# Patient Record
Sex: FEMALE | Race: WHITE | NOT HISPANIC OR LATINO | Employment: UNEMPLOYED | URBAN - NONMETROPOLITAN AREA
[De-identification: names, ages, dates, MRNs, and addresses within clinical notes are randomized per-mention and may not be internally consistent; named-entity substitution may affect disease eponyms.]

---

## 2020-05-11 ENCOUNTER — HOSPITAL ENCOUNTER (INPATIENT)
Facility: HOSPITAL | Age: 50
LOS: 7 days | Discharge: HOME | End: 2020-05-18
Attending: FAMILY MEDICINE | Admitting: HOSPITALIST

## 2020-05-11 ENCOUNTER — APPOINTMENT (OUTPATIENT)
Dept: GENERAL RADIOLOGY | Facility: HOSPITAL | Age: 50
End: 2020-05-11

## 2020-05-11 ENCOUNTER — APPOINTMENT (OUTPATIENT)
Dept: CT IMAGING | Facility: HOSPITAL | Age: 50
End: 2020-05-11

## 2020-05-11 DIAGNOSIS — Z78.9 IMPAIRED MOBILITY AND ACTIVITIES OF DAILY LIVING: ICD-10-CM

## 2020-05-11 DIAGNOSIS — N17.9 ACUTE RENAL FAILURE, UNSPECIFIED ACUTE RENAL FAILURE TYPE (HCC): ICD-10-CM

## 2020-05-11 DIAGNOSIS — T68.XXXA HYPOTHERMIA, INITIAL ENCOUNTER: ICD-10-CM

## 2020-05-11 DIAGNOSIS — R13.12 OROPHARYNGEAL DYSPHAGIA: ICD-10-CM

## 2020-05-11 DIAGNOSIS — Z74.09 IMPAIRED MOBILITY AND ACTIVITIES OF DAILY LIVING: ICD-10-CM

## 2020-05-11 DIAGNOSIS — Z74.09 IMPAIRED FUNCTIONAL MOBILITY, BALANCE, GAIT, AND ENDURANCE: ICD-10-CM

## 2020-05-11 DIAGNOSIS — E08.10 DIABETIC KETOACIDOSIS WITHOUT COMA ASSOCIATED WITH DIABETES MELLITUS DUE TO UNDERLYING CONDITION (HCC): Primary | ICD-10-CM

## 2020-05-11 PROBLEM — Z91.199 NON COMPLIANCE WITH MEDICAL TREATMENT: Status: ACTIVE | Noted: 2020-02-19

## 2020-05-11 PROBLEM — F41.9 ANXIETY: Status: ACTIVE | Noted: 2020-02-19

## 2020-05-11 PROBLEM — J44.9 CHRONIC OBSTRUCTIVE PULMONARY DISEASE (HCC): Status: ACTIVE | Noted: 2020-02-19

## 2020-05-11 PROBLEM — A41.9 SEPSIS (HCC): Status: ACTIVE | Noted: 2020-05-11

## 2020-05-11 PROBLEM — E87.20 METABOLIC ACIDOSIS: Status: ACTIVE | Noted: 2020-05-11

## 2020-05-11 PROBLEM — E78.5 HLD (HYPERLIPIDEMIA): Status: ACTIVE | Noted: 2020-05-11

## 2020-05-11 PROBLEM — K21.9 GASTROESOPHAGEAL REFLUX DISEASE: Status: ACTIVE | Noted: 2020-02-19

## 2020-05-11 PROBLEM — E86.0 DEHYDRATION: Status: ACTIVE | Noted: 2020-05-11

## 2020-05-11 PROBLEM — E87.1 HYPONATREMIA: Status: ACTIVE | Noted: 2020-03-18

## 2020-05-11 PROBLEM — Z72.0 TOBACCO ABUSE: Status: ACTIVE | Noted: 2020-05-11

## 2020-05-11 PROBLEM — E11.9 DIABETES MELLITUS (HCC): Status: ACTIVE | Noted: 2020-05-11

## 2020-05-11 PROBLEM — E87.1 HYPONATREMIA: Status: ACTIVE | Noted: 2020-05-11

## 2020-05-11 PROBLEM — N39.0 UTI (URINARY TRACT INFECTION): Status: ACTIVE | Noted: 2020-05-11

## 2020-05-11 PROBLEM — D72.829 LEUKOCYTOSIS: Status: ACTIVE | Noted: 2020-05-11

## 2020-05-11 PROBLEM — G93.41 ACUTE METABOLIC ENCEPHALOPATHY: Status: ACTIVE | Noted: 2019-12-22

## 2020-05-11 LAB
ACETONE BLD QL: ABNORMAL
ALBUMIN SERPL-MCNC: 2.5 G/DL (ref 3.5–5.2)
ALBUMIN SERPL-MCNC: 2.7 G/DL (ref 3.5–5.2)
ALBUMIN/GLOB SERPL: 0.7 G/DL
ALBUMIN/GLOB SERPL: 0.7 G/DL
ALP SERPL-CCNC: 268 U/L (ref 39–117)
ALP SERPL-CCNC: 280 U/L (ref 39–117)
ALT SERPL W P-5'-P-CCNC: 12 U/L (ref 1–33)
ALT SERPL W P-5'-P-CCNC: 20 U/L (ref 1–33)
AMORPH URATE CRY URNS QL MICRO: ABNORMAL /HPF
AMPHET+METHAMPHET UR QL: NEGATIVE
AMPHETAMINES UR QL: NEGATIVE
ANION GAP SERPL CALCULATED.3IONS-SCNC: 32 MMOL/L (ref 5–15)
ANION GAP SERPL CALCULATED.3IONS-SCNC: 35 MMOL/L (ref 5–15)
ARTERIAL PATENCY WRIST A: ABNORMAL
ARTERIAL PATENCY WRIST A: ABNORMAL
AST SERPL-CCNC: 20 U/L (ref 1–32)
AST SERPL-CCNC: 43 U/L (ref 1–32)
ATMOSPHERIC PRESS: 753 MMHG
ATMOSPHERIC PRESS: 753 MMHG
BACTERIA UR QL AUTO: ABNORMAL /HPF
BARBITURATES UR QL SCN: NEGATIVE
BASE EXCESS BLDA CALC-SCNC: -24.7 MMOL/L (ref 0–2)
BASE EXCESS BLDA CALC-SCNC: -30.6 MMOL/L (ref 0–2)
BASOPHILS # BLD AUTO: 0.08 10*3/MM3 (ref 0–0.2)
BASOPHILS NFR BLD AUTO: 0.4 % (ref 0–1.5)
BDY SITE: ABNORMAL
BDY SITE: ABNORMAL
BENZODIAZ UR QL SCN: NEGATIVE
BILIRUB SERPL-MCNC: 0.3 MG/DL (ref 0.2–1.2)
BILIRUB SERPL-MCNC: 0.3 MG/DL (ref 0.2–1.2)
BILIRUB UR QL STRIP: NEGATIVE
BUN BLD-MCNC: 49 MG/DL (ref 6–20)
BUN BLD-MCNC: 57 MG/DL (ref 6–20)
BUN/CREAT SERPL: 24.1 (ref 7–25)
BUN/CREAT SERPL: 24.3 (ref 7–25)
BUPRENORPHINE SERPL-MCNC: NEGATIVE NG/ML
CALCIUM SPEC-SCNC: 8.3 MG/DL (ref 8.6–10.5)
CALCIUM SPEC-SCNC: 8.6 MG/DL (ref 8.6–10.5)
CANNABINOIDS SERPL QL: NEGATIVE
CHLORIDE SERPL-SCNC: 76 MMOL/L (ref 98–107)
CHLORIDE SERPL-SCNC: 77 MMOL/L (ref 98–107)
CLARITY UR: ABNORMAL
CO2 SERPL-SCNC: 3 MMOL/L (ref 22–29)
CO2 SERPL-SCNC: 3 MMOL/L (ref 22–29)
COCAINE UR QL: NEGATIVE
COLOR UR: YELLOW
CREAT BLD-MCNC: 2.03 MG/DL (ref 0.57–1)
CREAT BLD-MCNC: 2.35 MG/DL (ref 0.57–1)
D-LACTATE SERPL-SCNC: 2 MMOL/L (ref 0.5–2)
D-LACTATE SERPL-SCNC: 3.8 MMOL/L (ref 0.5–2)
DACRYOCYTES BLD QL SMEAR: ABNORMAL
DEPRECATED RDW RBC AUTO: 59 FL (ref 37–54)
DEPRECATED RDW RBC AUTO: 63.2 FL (ref 37–54)
EOSINOPHIL # BLD AUTO: 0 10*3/MM3 (ref 0–0.4)
EOSINOPHIL NFR BLD AUTO: 0 % (ref 0.3–6.2)
ERYTHROCYTE [DISTWIDTH] IN BLOOD BY AUTOMATED COUNT: 15.3 % (ref 12.3–15.4)
ERYTHROCYTE [DISTWIDTH] IN BLOOD BY AUTOMATED COUNT: 15.4 % (ref 12.3–15.4)
ETHANOL BLD-MCNC: <10 MG/DL (ref 0–10)
ETHANOL UR QL: <0.01 %
GFR SERPL CREATININE-BSD FRML MDRD: 22 ML/MIN/1.73
GFR SERPL CREATININE-BSD FRML MDRD: 26 ML/MIN/1.73
GLOBULIN UR ELPH-MCNC: 3.6 GM/DL
GLOBULIN UR ELPH-MCNC: 3.7 GM/DL
GLUCOSE BLD-MCNC: 1124 MG/DL (ref 65–99)
GLUCOSE BLD-MCNC: 1192 MG/DL (ref 65–99)
GLUCOSE UR STRIP-MCNC: ABNORMAL MG/DL
HBA1C MFR BLD: 11.5 % (ref 4.8–5.6)
HCG SERPL QL: NEGATIVE
HCO3 BLDA-SCNC: 1.9 MMOL/L (ref 20–26)
HCO3 BLDA-SCNC: 3.8 MMOL/L (ref 20–26)
HCT VFR BLD AUTO: 37.4 % (ref 34–46.6)
HCT VFR BLD AUTO: 38.9 % (ref 34–46.6)
HGB BLD-MCNC: 10.8 G/DL (ref 12–15.9)
HGB BLD-MCNC: 10.9 G/DL (ref 12–15.9)
HGB UR QL STRIP.AUTO: ABNORMAL
HOROWITZ INDEX BLD+IHG-RTO: 21 %
HYALINE CASTS UR QL AUTO: ABNORMAL /LPF
IMM GRANULOCYTES # BLD AUTO: 0.15 10*3/MM3 (ref 0–0.05)
IMM GRANULOCYTES NFR BLD AUTO: 0.8 % (ref 0–0.5)
KETONES UR QL STRIP: ABNORMAL
LACTATE HOLD SPECIMEN: NORMAL
LEUKOCYTE ESTERASE UR QL STRIP.AUTO: NEGATIVE
LIPASE SERPL-CCNC: 12 U/L (ref 13–60)
LYMPHOCYTES # BLD AUTO: 1.48 10*3/MM3 (ref 0.7–3.1)
LYMPHOCYTES # BLD MANUAL: 0.26 10*3/MM3 (ref 0.7–3.1)
LYMPHOCYTES NFR BLD AUTO: 7.5 % (ref 19.6–45.3)
LYMPHOCYTES NFR BLD MANUAL: 1 % (ref 19.6–45.3)
LYMPHOCYTES NFR BLD MANUAL: 5 % (ref 5–12)
Lab: ABNORMAL
Lab: ABNORMAL
MAGNESIUM SERPL-MCNC: 2.4 MG/DL (ref 1.6–2.6)
MAGNESIUM SERPL-MCNC: 2.6 MG/DL (ref 1.6–2.6)
MCH RBC QN AUTO: 30 PG (ref 26.6–33)
MCH RBC QN AUTO: 30.5 PG (ref 26.6–33)
MCHC RBC AUTO-ENTMCNC: 28 G/DL (ref 31.5–35.7)
MCHC RBC AUTO-ENTMCNC: 28.9 G/DL (ref 31.5–35.7)
MCV RBC AUTO: 103.9 FL (ref 79–97)
MCV RBC AUTO: 109 FL (ref 79–97)
METAMYELOCYTES NFR BLD MANUAL: 1 % (ref 0–0)
METHADONE UR QL SCN: NEGATIVE
MODALITY: ABNORMAL
MODALITY: ABNORMAL
MONOCYTES # BLD AUTO: 0.23 10*3/MM3 (ref 0.1–0.9)
MONOCYTES # BLD AUTO: 1.28 10*3/MM3 (ref 0.1–0.9)
MONOCYTES NFR BLD AUTO: 1.2 % (ref 5–12)
NEUTROPHILS # BLD AUTO: 17.68 10*3/MM3 (ref 1.7–7)
NEUTROPHILS # BLD AUTO: 23.83 10*3/MM3 (ref 1.7–7)
NEUTROPHILS NFR BLD AUTO: 90.1 % (ref 42.7–76)
NEUTROPHILS NFR BLD MANUAL: 91 % (ref 42.7–76)
NEUTS BAND NFR BLD MANUAL: 2 % (ref 0–5)
NITRITE UR QL STRIP: NEGATIVE
NRBC BLD AUTO-RTO: 0.7 /100 WBC (ref 0–0.2)
OPIATES UR QL: NEGATIVE
OSMOLALITY SERPL: 347 MOSM/KG (ref 280–290)
OXYCODONE UR QL SCN: NEGATIVE
PCO2 BLDA: 11.7 MM HG (ref 35–45)
PCO2 BLDA: 13.6 MM HG (ref 35–45)
PCP UR QL SCN: NEGATIVE
PH BLDA: 6.83 PH UNITS (ref 7.35–7.45)
PH BLDA: 7.05 PH UNITS (ref 7.35–7.45)
PH UR STRIP.AUTO: <=5 [PH] (ref 5–9)
PHOSPHATE SERPL-MCNC: 6.9 MG/DL (ref 2.5–4.5)
PLAT MORPH BLD: NORMAL
PLATELET # BLD AUTO: 288 10*3/MM3 (ref 140–450)
PLATELET # BLD AUTO: 336 10*3/MM3 (ref 140–450)
PMV BLD AUTO: 10.3 FL (ref 6–12)
PMV BLD AUTO: 10.3 FL (ref 6–12)
PO2 BLDA: 145 MM HG (ref 83–108)
PO2 BLDA: 154 MM HG (ref 83–108)
POTASSIUM BLD-SCNC: 5.2 MMOL/L (ref 3.5–5.2)
POTASSIUM BLD-SCNC: 6.4 MMOL/L (ref 3.5–5.2)
PROPOXYPH UR QL: NEGATIVE
PROT SERPL-MCNC: 6.1 G/DL (ref 6–8.5)
PROT SERPL-MCNC: 6.4 G/DL (ref 6–8.5)
PROT UR QL STRIP: ABNORMAL
RBC # BLD AUTO: 3.57 10*6/MM3 (ref 3.77–5.28)
RBC # BLD AUTO: 3.6 10*6/MM3 (ref 3.77–5.28)
RBC # UR: ABNORMAL /HPF
REF LAB TEST METHOD: ABNORMAL
SAO2 % BLDCOA: 98.4 % (ref 94–99)
SAO2 % BLDCOA: 99.4 % (ref 94–99)
SODIUM BLD-SCNC: 112 MMOL/L (ref 136–145)
SODIUM BLD-SCNC: 114 MMOL/L (ref 136–145)
SP GR UR STRIP: 1.02 (ref 1–1.03)
SQUAMOUS #/AREA URNS HPF: ABNORMAL /HPF
TRICYCLICS UR QL SCN: NEGATIVE
TROPONIN T SERPL-MCNC: <0.01 NG/ML (ref 0–0.03)
UROBILINOGEN UR QL STRIP: ABNORMAL
VENTILATOR MODE: ABNORMAL
VENTILATOR MODE: ABNORMAL
WBC MORPH BLD: NORMAL
WBC NRBC COR # BLD: 19.62 10*3/MM3 (ref 3.4–10.8)
WBC NRBC COR # BLD: 25.62 10*3/MM3 (ref 3.4–10.8)
WBC UR QL AUTO: ABNORMAL /HPF
WHOLE BLOOD HOLD SPECIMEN: NORMAL

## 2020-05-11 PROCEDURE — 83735 ASSAY OF MAGNESIUM: CPT | Performed by: HOSPITALIST

## 2020-05-11 PROCEDURE — 25010000003 INSULIN REGULAR HUMAN PER 5 UNITS: Performed by: HOSPITALIST

## 2020-05-11 PROCEDURE — 87088 URINE BACTERIA CULTURE: CPT | Performed by: HOSPITALIST

## 2020-05-11 PROCEDURE — 87040 BLOOD CULTURE FOR BACTERIA: CPT | Performed by: PHYSICIAN ASSISTANT

## 2020-05-11 PROCEDURE — 84703 CHORIONIC GONADOTROPIN ASSAY: CPT | Performed by: PHYSICIAN ASSISTANT

## 2020-05-11 PROCEDURE — 83735 ASSAY OF MAGNESIUM: CPT | Performed by: PHYSICIAN ASSISTANT

## 2020-05-11 PROCEDURE — 74176 CT ABD & PELVIS W/O CONTRAST: CPT

## 2020-05-11 PROCEDURE — 83930 ASSAY OF BLOOD OSMOLALITY: CPT | Performed by: HOSPITALIST

## 2020-05-11 PROCEDURE — 82803 BLOOD GASES ANY COMBINATION: CPT

## 2020-05-11 PROCEDURE — 85025 COMPLETE CBC W/AUTO DIFF WBC: CPT | Performed by: HOSPITALIST

## 2020-05-11 PROCEDURE — 36600 WITHDRAWAL OF ARTERIAL BLOOD: CPT

## 2020-05-11 PROCEDURE — 83690 ASSAY OF LIPASE: CPT | Performed by: PHYSICIAN ASSISTANT

## 2020-05-11 PROCEDURE — 87186 SC STD MICRODIL/AGAR DIL: CPT | Performed by: PHYSICIAN ASSISTANT

## 2020-05-11 PROCEDURE — 25010000003 INSULIN REGULAR HUMAN PER 5 UNITS: Performed by: PHYSICIAN ASSISTANT

## 2020-05-11 PROCEDURE — 99285 EMERGENCY DEPT VISIT HI MDM: CPT

## 2020-05-11 PROCEDURE — 25010000002 PIPERACILLIN-TAZOBACTAM: Performed by: PHYSICIAN ASSISTANT

## 2020-05-11 PROCEDURE — 84100 ASSAY OF PHOSPHORUS: CPT | Performed by: HOSPITALIST

## 2020-05-11 PROCEDURE — 83605 ASSAY OF LACTIC ACID: CPT | Performed by: PHYSICIAN ASSISTANT

## 2020-05-11 PROCEDURE — 87086 URINE CULTURE/COLONY COUNT: CPT | Performed by: HOSPITALIST

## 2020-05-11 PROCEDURE — 81001 URINALYSIS AUTO W/SCOPE: CPT | Performed by: PHYSICIAN ASSISTANT

## 2020-05-11 PROCEDURE — 63710000001 INSULIN REGULAR HUMAN PER 5 UNITS: Performed by: PHYSICIAN ASSISTANT

## 2020-05-11 PROCEDURE — 85007 BL SMEAR W/DIFF WBC COUNT: CPT | Performed by: PHYSICIAN ASSISTANT

## 2020-05-11 PROCEDURE — 80053 COMPREHEN METABOLIC PANEL: CPT | Performed by: HOSPITALIST

## 2020-05-11 PROCEDURE — 80307 DRUG TEST PRSMV CHEM ANLYZR: CPT | Performed by: PHYSICIAN ASSISTANT

## 2020-05-11 PROCEDURE — 87040 BLOOD CULTURE FOR BACTERIA: CPT | Performed by: HOSPITALIST

## 2020-05-11 PROCEDURE — 71045 X-RAY EXAM CHEST 1 VIEW: CPT

## 2020-05-11 PROCEDURE — 82009 KETONE BODYS QUAL: CPT | Performed by: PHYSICIAN ASSISTANT

## 2020-05-11 PROCEDURE — 84484 ASSAY OF TROPONIN QUANT: CPT | Performed by: HOSPITALIST

## 2020-05-11 PROCEDURE — 80053 COMPREHEN METABOLIC PANEL: CPT | Performed by: PHYSICIAN ASSISTANT

## 2020-05-11 PROCEDURE — 83036 HEMOGLOBIN GLYCOSYLATED A1C: CPT | Performed by: HOSPITALIST

## 2020-05-11 PROCEDURE — 87150 DNA/RNA AMPLIFIED PROBE: CPT | Performed by: PHYSICIAN ASSISTANT

## 2020-05-11 PROCEDURE — 85025 COMPLETE CBC W/AUTO DIFF WBC: CPT | Performed by: PHYSICIAN ASSISTANT

## 2020-05-11 PROCEDURE — 87186 SC STD MICRODIL/AGAR DIL: CPT | Performed by: HOSPITALIST

## 2020-05-11 RX ORDER — ACETAMINOPHEN 160 MG/5ML
650 SOLUTION ORAL EVERY 4 HOURS PRN
Status: DISCONTINUED | OUTPATIENT
Start: 2020-05-11 | End: 2020-05-11 | Stop reason: SDUPTHER

## 2020-05-11 RX ORDER — ONDANSETRON 4 MG/1
4 TABLET, FILM COATED ORAL
Status: ON HOLD | COMMUNITY
Start: 2015-06-26 | End: 2020-05-17

## 2020-05-11 RX ORDER — ACETAMINOPHEN 325 MG/1
650 TABLET ORAL EVERY 4 HOURS PRN
Status: DISCONTINUED | OUTPATIENT
Start: 2020-05-11 | End: 2020-05-18 | Stop reason: HOSPADM

## 2020-05-11 RX ORDER — LANCETS 30 GAUGE
EACH MISCELLANEOUS
COMMUNITY
Start: 2019-06-13

## 2020-05-11 RX ORDER — SODIUM CHLORIDE AND POTASSIUM CHLORIDE 150; 900 MG/100ML; MG/100ML
250 INJECTION, SOLUTION INTRAVENOUS CONTINUOUS PRN
Status: DISCONTINUED | OUTPATIENT
Start: 2020-05-11 | End: 2020-05-13

## 2020-05-11 RX ORDER — DEXTROSE AND SODIUM CHLORIDE 5; .9 G/100ML; G/100ML
150 INJECTION, SOLUTION INTRAVENOUS CONTINUOUS PRN
Status: DISCONTINUED | OUTPATIENT
Start: 2020-05-11 | End: 2020-05-13

## 2020-05-11 RX ORDER — DEXTROSE AND SODIUM CHLORIDE 5; .45 G/100ML; G/100ML
150 INJECTION, SOLUTION INTRAVENOUS CONTINUOUS PRN
Status: DISCONTINUED | OUTPATIENT
Start: 2020-05-11 | End: 2020-05-13

## 2020-05-11 RX ORDER — POTASSIUM CHLORIDE, DEXTROSE MONOHYDRATE AND SODIUM CHLORIDE 300; 5; 900 MG/100ML; G/100ML; MG/100ML
150 INJECTION, SOLUTION INTRAVENOUS CONTINUOUS PRN
Status: DISCONTINUED | OUTPATIENT
Start: 2020-05-11 | End: 2020-05-13

## 2020-05-11 RX ORDER — OMEPRAZOLE 20 MG/1
20 CAPSULE, DELAYED RELEASE ORAL DAILY
COMMUNITY

## 2020-05-11 RX ORDER — FLASH GLUCOSE SENSOR
KIT MISCELLANEOUS
COMMUNITY
Start: 2020-03-20 | End: 2020-06-19

## 2020-05-11 RX ORDER — INSULIN GLARGINE 100 [IU]/ML
40 INJECTION, SOLUTION SUBCUTANEOUS DAILY
COMMUNITY
Start: 2020-02-13 | End: 2020-08-12

## 2020-05-11 RX ORDER — IBUPROFEN 800 MG/1
800 TABLET ORAL
COMMUNITY
End: 2020-05-18 | Stop reason: HOSPADM

## 2020-05-11 RX ORDER — PRAMIPEXOLE DIHYDROCHLORIDE 0.5 MG/1
1 TABLET ORAL NIGHTLY
COMMUNITY
Start: 2020-02-13 | End: 2020-08-12

## 2020-05-11 RX ORDER — LEVOCETIRIZINE DIHYDROCHLORIDE 5 MG/1
5 TABLET, FILM COATED ORAL DAILY
COMMUNITY
Start: 2020-02-13 | End: 2020-08-12

## 2020-05-11 RX ORDER — SIMVASTATIN 20 MG
20 TABLET ORAL DAILY
COMMUNITY
Start: 2020-02-13 | End: 2020-08-12

## 2020-05-11 RX ORDER — SODIUM CHLORIDE 0.9 % (FLUSH) 0.9 %
30 SYRINGE (ML) INJECTION ONCE AS NEEDED
Status: DISCONTINUED | OUTPATIENT
Start: 2020-05-11 | End: 2020-05-18 | Stop reason: HOSPADM

## 2020-05-11 RX ORDER — FAMOTIDINE 10 MG/ML
20 INJECTION, SOLUTION INTRAVENOUS DAILY
Status: DISCONTINUED | OUTPATIENT
Start: 2020-05-12 | End: 2020-05-15

## 2020-05-11 RX ORDER — ONDANSETRON 4 MG/1
4 TABLET, FILM COATED ORAL EVERY 6 HOURS PRN
Status: DISCONTINUED | OUTPATIENT
Start: 2020-05-11 | End: 2020-05-18 | Stop reason: HOSPADM

## 2020-05-11 RX ORDER — VENLAFAXINE HYDROCHLORIDE 75 MG/1
75 CAPSULE, EXTENDED RELEASE ORAL DAILY
COMMUNITY
Start: 2020-02-13 | End: 2020-08-12

## 2020-05-11 RX ORDER — SODIUM CHLORIDE 9 MG/ML
10 INJECTION, SOLUTION INTRAVENOUS CONTINUOUS PRN
Status: DISCONTINUED | OUTPATIENT
Start: 2020-05-11 | End: 2020-05-13

## 2020-05-11 RX ORDER — DEXTROSE, SODIUM CHLORIDE, AND POTASSIUM CHLORIDE 5; .9; .15 G/100ML; G/100ML; G/100ML
150 INJECTION INTRAVENOUS CONTINUOUS PRN
Status: DISCONTINUED | OUTPATIENT
Start: 2020-05-11 | End: 2020-05-13

## 2020-05-11 RX ORDER — ACETAMINOPHEN 650 MG/1
650 SUPPOSITORY RECTAL EVERY 4 HOURS PRN
Status: DISCONTINUED | OUTPATIENT
Start: 2020-05-11 | End: 2020-05-18 | Stop reason: HOSPADM

## 2020-05-11 RX ORDER — RANITIDINE 150 MG/1
150 CAPSULE ORAL
Status: ON HOLD | COMMUNITY
End: 2020-05-17 | Stop reason: ALTCHOICE

## 2020-05-11 RX ORDER — BUSPIRONE HYDROCHLORIDE 10 MG/1
10 TABLET ORAL
COMMUNITY
Start: 2020-02-13 | End: 2020-08-12

## 2020-05-11 RX ORDER — DEXTROSE, SODIUM CHLORIDE, AND POTASSIUM CHLORIDE 5; .45; .3 G/100ML; G/100ML; G/100ML
150 INJECTION INTRAVENOUS CONTINUOUS PRN
Status: DISCONTINUED | OUTPATIENT
Start: 2020-05-11 | End: 2020-05-13

## 2020-05-11 RX ORDER — ROPINIROLE 8 MG/1
3 TABLET, FILM COATED, EXTENDED RELEASE ORAL
Status: ON HOLD | COMMUNITY
End: 2020-05-17 | Stop reason: ALTCHOICE

## 2020-05-11 RX ORDER — SODIUM CHLORIDE AND POTASSIUM CHLORIDE 300; 900 MG/100ML; MG/100ML
250 INJECTION, SOLUTION INTRAVENOUS CONTINUOUS PRN
Status: DISCONTINUED | OUTPATIENT
Start: 2020-05-11 | End: 2020-05-13

## 2020-05-11 RX ORDER — DEXTROSE MONOHYDRATE 25 G/50ML
12.5 INJECTION, SOLUTION INTRAVENOUS AS NEEDED
Status: DISCONTINUED | OUTPATIENT
Start: 2020-05-11 | End: 2020-05-13

## 2020-05-11 RX ORDER — IPRATROPIUM BROMIDE AND ALBUTEROL SULFATE 2.5; .5 MG/3ML; MG/3ML
SOLUTION RESPIRATORY (INHALATION)
COMMUNITY
Start: 2020-01-21

## 2020-05-11 RX ORDER — SODIUM CHLORIDE 0.9 % (FLUSH) 0.9 %
10 SYRINGE (ML) INJECTION AS NEEDED
Status: DISCONTINUED | OUTPATIENT
Start: 2020-05-11 | End: 2020-05-18 | Stop reason: HOSPADM

## 2020-05-11 RX ORDER — SODIUM CHLORIDE 9 MG/ML
30 INJECTION, SOLUTION INTRAVENOUS CONTINUOUS PRN
Status: DISCONTINUED | OUTPATIENT
Start: 2020-05-11 | End: 2020-05-13

## 2020-05-11 RX ORDER — DEXTROSE, SODIUM CHLORIDE, AND POTASSIUM CHLORIDE 5; .45; .15 G/100ML; G/100ML; G/100ML
150 INJECTION INTRAVENOUS CONTINUOUS PRN
Status: DISCONTINUED | OUTPATIENT
Start: 2020-05-11 | End: 2020-05-13

## 2020-05-11 RX ORDER — ESTRADIOL 2 MG/1
TABLET ORAL
COMMUNITY
Start: 2020-02-25 | End: 2020-05-18 | Stop reason: HOSPADM

## 2020-05-11 RX ORDER — GABAPENTIN 800 MG/1
800 TABLET ORAL 3 TIMES DAILY
COMMUNITY

## 2020-05-11 RX ORDER — FLUTICASONE PROPIONATE 50 MCG
1 SPRAY, SUSPENSION (ML) NASAL DAILY
COMMUNITY
Start: 2020-02-13 | End: 2020-08-12

## 2020-05-11 RX ORDER — ALBUTEROL SULFATE 90 UG/1
1 AEROSOL, METERED RESPIRATORY (INHALATION) EVERY 6 HOURS PRN
COMMUNITY
Start: 2020-02-13 | End: 2020-08-12

## 2020-05-11 RX ORDER — TRAZODONE HYDROCHLORIDE 100 MG/1
100 TABLET ORAL
COMMUNITY

## 2020-05-11 RX ORDER — ONDANSETRON 2 MG/ML
4 INJECTION INTRAMUSCULAR; INTRAVENOUS EVERY 6 HOURS PRN
Status: DISCONTINUED | OUTPATIENT
Start: 2020-05-11 | End: 2020-05-18 | Stop reason: HOSPADM

## 2020-05-11 RX ORDER — SODIUM CHLORIDE 0.9 % (FLUSH) 0.9 %
10 SYRINGE (ML) INJECTION EVERY 12 HOURS SCHEDULED
Status: DISCONTINUED | OUTPATIENT
Start: 2020-05-11 | End: 2020-05-18 | Stop reason: HOSPADM

## 2020-05-11 RX ORDER — INSULIN LISPRO 100 [IU]/ML
INJECTION, SOLUTION INTRAVENOUS; SUBCUTANEOUS
COMMUNITY
Start: 2020-02-13 | End: 2021-02-14

## 2020-05-11 RX ORDER — SODIUM CHLORIDE 450 MG/100ML
250 INJECTION, SOLUTION INTRAVENOUS CONTINUOUS PRN
Status: DISCONTINUED | OUTPATIENT
Start: 2020-05-11 | End: 2020-05-18 | Stop reason: HOSPADM

## 2020-05-11 RX ORDER — SODIUM CHLORIDE 9 MG/ML
250 INJECTION, SOLUTION INTRAVENOUS CONTINUOUS PRN
Status: DISCONTINUED | OUTPATIENT
Start: 2020-05-11 | End: 2020-05-13

## 2020-05-11 RX ORDER — SODIUM CHLORIDE 0.9 % (FLUSH) 0.9 %
10 SYRINGE (ML) INJECTION ONCE AS NEEDED
Status: DISCONTINUED | OUTPATIENT
Start: 2020-05-11 | End: 2020-05-18 | Stop reason: HOSPADM

## 2020-05-11 RX ORDER — SODIUM CHLORIDE 0.9 % (FLUSH) 0.9 %
3 SYRINGE (ML) INJECTION EVERY 12 HOURS SCHEDULED
Status: DISCONTINUED | OUTPATIENT
Start: 2020-05-11 | End: 2020-05-18 | Stop reason: HOSPADM

## 2020-05-11 RX ORDER — SODIUM CHLORIDE AND POTASSIUM CHLORIDE 150; 450 MG/100ML; MG/100ML
250 INJECTION, SOLUTION INTRAVENOUS CONTINUOUS PRN
Status: DISCONTINUED | OUTPATIENT
Start: 2020-05-11 | End: 2020-05-18 | Stop reason: HOSPADM

## 2020-05-11 RX ORDER — CYCLOBENZAPRINE HCL 10 MG
1 TABLET ORAL
Status: ON HOLD | COMMUNITY
Start: 2019-12-18 | End: 2020-05-17

## 2020-05-11 RX ORDER — SODIUM CHLORIDE 9 MG/ML
250 INJECTION, SOLUTION INTRAVENOUS CONTINUOUS
Status: DISCONTINUED | OUTPATIENT
Start: 2020-05-11 | End: 2020-05-13

## 2020-05-11 RX ADMIN — HUMAN INSULIN 6 UNITS: 100 INJECTION, SOLUTION SUBCUTANEOUS at 19:48

## 2020-05-11 RX ADMIN — SODIUM CHLORIDE 6 UNITS/HR: 9 INJECTION, SOLUTION INTRAVENOUS at 20:00

## 2020-05-11 RX ADMIN — SODIUM CHLORIDE 1899 ML: 900 INJECTION, SOLUTION INTRAVENOUS at 19:48

## 2020-05-11 RX ADMIN — SODIUM CHLORIDE 2000 ML: 9 INJECTION, SOLUTION INTRAVENOUS at 22:00

## 2020-05-11 RX ADMIN — PIPERACILLIN SODIUM,TAZOBACTAM SODIUM 3.38 G: 3; .375 INJECTION, POWDER, FOR SOLUTION INTRAVENOUS at 20:25

## 2020-05-11 RX ADMIN — SODIUM BICARBONATE 50 MEQ: 84 INJECTION INTRAVENOUS at 21:22

## 2020-05-11 RX ADMIN — SODIUM CHLORIDE 250 ML/HR: 900 INJECTION, SOLUTION INTRAVENOUS at 23:00

## 2020-05-11 NOTE — ED PROVIDER NOTES
"Subjective   Patient presents to emergency department for hyperglycemia, abdominal pain.  States symptoms started today but her blood glucose is been very elevated for 2 to 3 days.  Endorses IV insulin use this morning.  States \"my mouth is really dry and I have been drinking a lot of Sprite which is not helping\".  Recent admission for DKA 3/16/2020.        History provided by:  Patient   used: No    Hyperglycemia   Severity:  Severe  Duration:  3 days  Timing:  Constant  Progression:  Worsening  Diabetes status:  Controlled with insulin  Context: not recent illness    Associated symptoms: abdominal pain, fatigue and nausea    Associated symptoms: no chest pain, no confusion, no dysuria, no fever, no shortness of breath, no vomiting and no weakness        Review of Systems   Constitutional: Positive for fatigue. Negative for chills and fever.   HENT: Negative for sore throat and trouble swallowing.    Eyes: Negative for visual disturbance.   Respiratory: Negative for cough, shortness of breath and wheezing.    Cardiovascular: Negative for chest pain and leg swelling.   Gastrointestinal: Positive for abdominal pain and nausea. Negative for vomiting.   Genitourinary: Negative for dysuria and flank pain.   Musculoskeletal: Negative for back pain.   Skin: Negative for color change.   Allergic/Immunologic: Negative for immunocompromised state.   Neurological: Negative for syncope and weakness.   Hematological: Does not bruise/bleed easily.   Psychiatric/Behavioral: Negative for confusion.       History reviewed. No pertinent past medical history.    Allergies   Allergen Reactions   • Codeine Itching and Nausea And Vomiting   • Hydrocodone-Acetaminophen Itching and Nausea And Vomiting   • Morphine Nausea And Vomiting       History reviewed. No pertinent surgical history.    Family History   Problem Relation Age of Onset   • Diabetes Brother        Social History     Socioeconomic History   • Marital " "status:      Spouse name: Not on file   • Number of children: Not on file   • Years of education: Not on file   • Highest education level: Not on file   Tobacco Use   • Smoking status: Current Every Day Smoker   Substance and Sexual Activity   • Alcohol use: Yes   • Drug use: Not Currently   • Sexual activity: Defer           Objective      /59 (BP Location: Right arm, Patient Position: Lying)   Pulse 98   Temp 93.8 °F (34.3 °C) (Rectal)   Resp 16   Ht 167.6 cm (66\")   Wt 63.3 kg (139 lb 9.6 oz)   SpO2 98%   BMI 22.53 kg/m²     Physical Exam   Constitutional: She is oriented to person, place, and time. She appears well-developed and well-nourished. She appears distressed.   HENT:   Head: Normocephalic and atraumatic.   Eyes: Conjunctivae are normal.   Cardiovascular: Normal rate, regular rhythm, normal heart sounds and intact distal pulses.   Pulmonary/Chest: Effort normal and breath sounds normal. No respiratory distress. She has no wheezes.   Kussmaul respirations observed   Abdominal: Soft. Bowel sounds are normal. She exhibits no distension and no mass. There is tenderness ( Generalized, nonfocal). There is no guarding.   Neurological: She is alert and oriented to person, place, and time.   Skin: Capillary refill takes less than 2 seconds. No rash noted.   Psychiatric: She has a normal mood and affect. Her behavior is normal. Thought content normal.   Nursing note and vitals reviewed.      Critical Care  Performed by: Heriberto Springer PA-C  Authorized by: Dhiraj Gudino MD     Critical care provider statement:     Critical care time (minutes):  45    Critical care was necessary to treat or prevent imminent or life-threatening deterioration of the following conditions:  Metabolic crisis, dehydration, sepsis and renal failure    Critical care was time spent personally by me on the following activities:  Ordering and performing treatments and interventions, ordering and review of " radiographic studies, ordering and review of laboratory studies, re-evaluation of patient's condition, examination of patient, evaluation of patient's response to treatment and discussions with consultants    I assumed direction of critical care for this patient from another provider in my specialty: no                 ED Course      Results for orders placed or performed during the hospital encounter of 05/11/20   Comprehensive Metabolic Panel   Result Value Ref Range    Glucose 1,124 (C) 65 - 99 mg/dL    BUN 49 (H) 6 - 20 mg/dL    Creatinine 2.03 (H) 0.57 - 1.00 mg/dL    Sodium 114 (C) 136 - 145 mmol/L    Potassium 6.4 (C) 3.5 - 5.2 mmol/L    Chloride 76 (L) 98 - 107 mmol/L    CO2 3.0 (L) 22.0 - 29.0 mmol/L    Calcium 8.6 8.6 - 10.5 mg/dL    Total Protein 6.4 6.0 - 8.5 g/dL    Albumin 2.70 (L) 3.50 - 5.20 g/dL    ALT (SGPT) 12 1 - 33 U/L    AST (SGOT) 20 1 - 32 U/L    Alkaline Phosphatase 268 (H) 39 - 117 U/L    Total Bilirubin 0.3 0.2 - 1.2 mg/dL    eGFR Non African Amer 26 (L) >60 mL/min/1.73    Globulin 3.7 gm/dL    A/G Ratio 0.7 g/dL    BUN/Creatinine Ratio 24.1 7.0 - 25.0    Anion Gap 35.0 (H) 5.0 - 15.0 mmol/L   Acetone   Result Value Ref Range    Acetone Small (A) Negative   Magnesium   Result Value Ref Range    Magnesium 2.6 1.6 - 2.6 mg/dL   Urinalysis With Microscopic If Indicated (No Culture) - Urine, Clean Catch   Result Value Ref Range    Color, UA Yellow Yellow, Straw, Dark Yellow, [place]    Appearance, UA Cloudy (A) Clear    pH, UA <=5.0 5.0 - 9.0    Specific Gravity, UA 1.020 1.003 - 1.030    Glucose, UA >=1000 mg/dL (3+) (A) Negative    Ketones, UA 80 mg/dL (3+) (A) Negative    Bilirubin, UA Negative Negative    Blood, UA Moderate (2+) (A) Negative    Protein, UA 30 mg/dL (1+) (A) Negative    Leuk Esterase, UA Negative Negative    Nitrite, UA Negative Negative    Urobilinogen, UA 0.2 E.U./dL 0.2 - 1.0 E.U./dL   CBC Auto Differential   Result Value Ref Range    WBC 25.62 (H) 3.40 - 10.80 10*3/mm3     RBC 3.57 (L) 3.77 - 5.28 10*6/mm3    Hemoglobin 10.9 (L) 12.0 - 15.9 g/dL    Hematocrit 38.9 34.0 - 46.6 %    .0 (H) 79.0 - 97.0 fL    MCH 30.5 26.6 - 33.0 pg    MCHC 28.0 (L) 31.5 - 35.7 g/dL    RDW 15.4 12.3 - 15.4 %    RDW-SD 63.2 (H) 37.0 - 54.0 fl    MPV 10.3 6.0 - 12.0 fL    Platelets 336 140 - 450 10*3/mm3   Lipase   Result Value Ref Range    Lipase 12 (L) 13 - 60 U/L   Manual Differential   Result Value Ref Range    Neutrophil % 91.0 (H) 42.7 - 76.0 %    Lymphocyte % 1.0 (L) 19.6 - 45.3 %    Monocyte % 5.0 5.0 - 12.0 %    Bands %  2.0 0.0 - 5.0 %    Metamyelocyte % 1.0 (H) 0.0 - 0.0 %    Neutrophils Absolute 23.83 (H) 1.70 - 7.00 10*3/mm3    Lymphocytes Absolute 0.26 (L) 0.70 - 3.10 10*3/mm3    Monocytes Absolute 1.28 (H) 0.10 - 0.90 10*3/mm3    Dacrocytes Slight/1+ None Seen    WBC Morphology Normal Normal    Platelet Morphology Normal Normal   Urine Drug Screen - Urine, Clean Catch   Result Value Ref Range    THC, Screen, Urine Negative Negative    Phencyclidine (PCP), Urine Negative Negative    Cocaine Screen, Urine Negative Negative    Methamphetamine, Ur Negative Negative    Opiate Screen Negative Negative    Amphetamine Screen, Urine Negative Negative    Benzodiazepine Screen, Urine Negative Negative    Tricyclic Antidepressants Screen Negative Negative    Methadone Screen, Urine Negative Negative    Barbiturates Screen, Urine Negative Negative    Oxycodone Screen, Urine Negative Negative    Propoxyphene Screen Negative Negative    Buprenorphine, Screen, Urine Negative Negative   Lactic Acid, Plasma   Result Value Ref Range    Lactate 3.8 (C) 0.5 - 2.0 mmol/L   hCG, Serum, Qualitative   Result Value Ref Range    HCG Qualitative Negative Negative   Urinalysis, Microscopic Only - Urine, Clean Catch   Result Value Ref Range    RBC, UA 0-2 (A) None Seen /HPF    WBC, UA 13-20 (A) None Seen, 0-2, 3-5 /HPF    Bacteria, UA Trace (A) None Seen /HPF    Squamous Epithelial Cells, UA 3-5 (A) None Seen, 0-2  /HPF    Hyaline Casts, UA 7-12 None Seen /LPF    Amorphous Crystals, UA Small/1+ None Seen /HPF    Methodology Manual Light Microscopy    Blood Gas, Arterial   Result Value Ref Range    Site Left Brachial     Daniel's Test N/A     pH, Arterial 6.828 (L) 7.350 - 7.450 pH units    pCO2, Arterial 11.7 (L) 35.0 - 45.0 mm Hg    pO2, Arterial 145.0 (H) 83.0 - 108.0 mm Hg    HCO3, Arterial 1.9 (L) 20.0 - 26.0 mmol/L    Base Excess, Arterial -30.6 (L) 0.0 - 2.0 mmol/L    O2 Saturation, Arterial 98.4 94.0 - 99.0 %    Barometric Pressure for Blood Gas 753 mmHg    Modality Room Air     FIO2 21 %    Ventilator Mode NA     Collected by     Light Blue Top   Result Value Ref Range    Extra Tube hold for add-on      Ct Abdomen Pelvis Without Contrast    Result Date: 5/11/2020  Narrative: CT Abdomen and Pelvis Without Contrast History: Abdominal pain Axials spiral scans of the abdomen and pelvis were obtained without contrast.  Coronal and sagital reconstructions were preformed.  This exam was performed according to our departmental dose-optimization program, which includes automated exposure control, adjustment of the mA and/or kV according to patient size and/or use of iterative reconstruction technique. DLP: 318.10 Comparison: None Findings: Scans of the lung bases demonstrate old granulomatous disease. No acute osseous abnormality. Degenerative changes and degenerative disc disease in the lumbar spine. The liver is unremarkable. Cholecystectomy. Splenic granulomata. The pancreas is unremarkable. The adrenal glands are unremarkable. No renal or ureteral calculi. No renal mass. No hydronephrosis. Unremarkable bladder. No pelvic mass. Hysterectomy. No abdominal aortic aneurysm. No adenopathy. Moderate amount of fluid distending the stomach. Minimally distended fluid-filled distal esophagus, question gastroesophageal reflux. Moderate amount retained feces in the colon. Diverticulosis of the colon. No bowel obstruction. Normal  appendix. No free air. No free fluid. No hernia.     Impression: Conclusion: Moderate amount of fluid distending the stomach. Minimally distended fluid-filled distal esophagus, question gastroesophageal reflux. Moderate amount retained feces in the colon. Diverticulosis of the colon. Cholecystectomy. Hysterectomy. [id number] Electronically signed by:  Michelet Lawrence MD  5/11/2020 8:34 PM CDT Workstation: [id number]    Xr Chest 1 View    Result Date: 5/11/2020  Narrative: PORTABLE CHEST HISTORY: Shortness of breath Portable AP upright film of the chest was obtained at 7:29 PM. COMPARISON: None FINDINGS: EKG leads. The lungs are clear of an acute process. Old granulomatous disease is present. The heart is not enlarged. The pulmonary vasculature is not increased. No pleural effusion. No pneumothorax. No acute osseous abnormality. Surgical clips right upper quadrant of the abdomen.     Impression: CONCLUSION: No Acute Disease [id number] Electronically signed by:  Michelet Lawrence MD  5/11/2020 7:51 PM CDT Workstation: [email or web address]    Patient admitted to hospitalist.                                     [facility]    Final diagnoses:   Diabetic ketoacidosis without coma associated with diabetes mellitus due to underlying condition (CMS/HCC)   Acute renal failure, unspecified acute renal failure type (CMS/HCC)   Hypothermia, initial encounter                 Heriberto Sprinegr PA-C  05/11/20 1114

## 2020-05-11 NOTE — ED NOTES
This RN and another RN cleaned up pt and pts bed - pt had urinated in the bed. Checked rectal temp.     Lou Partida, PRANEETH  05/11/20 0851

## 2020-05-11 NOTE — ED NOTES
Pt presents to ED via EMS from home with C/O high BS. Pt reports shes been checking her BS at home and it's been high for the past 2 or 3 days. Pt also C/O abdominal pain     Lou Partida RN  05/11/20 [id number]

## 2020-05-11 NOTE — ED NOTES
Pt unhooked herself from the monitor and pulled out IV and was walking around the ED hallways per another RN. RN directed patient to room.     Lou Partida, PRANEETH  05/11/20 1827       Lou Partida, PRANEETH  05/11/20 1830

## 2020-05-11 NOTE — ED NOTES
Attempted for 2nd IV - vein blew, but was able to collect bloodwork     Lou Partida, RN  05/11/20 [id number]

## 2020-05-12 LAB
ALBUMIN SERPL-MCNC: 2.2 G/DL (ref 3.5–5.2)
ALBUMIN/GLOB SERPL: 0.8 G/DL
ALP SERPL-CCNC: 227 U/L (ref 39–117)
ALT SERPL W P-5'-P-CCNC: 41 U/L (ref 1–33)
ANION GAP SERPL CALCULATED.3IONS-SCNC: 13 MMOL/L (ref 5–15)
ANION GAP SERPL CALCULATED.3IONS-SCNC: 14 MMOL/L (ref 5–15)
ANION GAP SERPL CALCULATED.3IONS-SCNC: 15 MMOL/L (ref 5–15)
ANION GAP SERPL CALCULATED.3IONS-SCNC: 15 MMOL/L (ref 5–15)
ANION GAP SERPL CALCULATED.3IONS-SCNC: 17 MMOL/L (ref 5–15)
ANION GAP SERPL CALCULATED.3IONS-SCNC: 21 MMOL/L (ref 5–15)
AST SERPL-CCNC: 97 U/L (ref 1–32)
BACTERIA BLD CULT: ABNORMAL
BASOPHILS # BLD AUTO: 0.02 10*3/MM3 (ref 0–0.2)
BASOPHILS NFR BLD AUTO: 0.4 % (ref 0–1.5)
BILIRUB SERPL-MCNC: 0.2 MG/DL (ref 0.2–1.2)
BUN BLD-MCNC: 38 MG/DL (ref 6–20)
BUN BLD-MCNC: 41 MG/DL (ref 6–20)
BUN BLD-MCNC: 44 MG/DL (ref 6–20)
BUN BLD-MCNC: 45 MG/DL (ref 6–20)
BUN BLD-MCNC: 49 MG/DL (ref 6–20)
BUN BLD-MCNC: 51 MG/DL (ref 6–20)
BUN/CREAT SERPL: 23.3 (ref 7–25)
BUN/CREAT SERPL: 24.3 (ref 7–25)
BUN/CREAT SERPL: 26.2 (ref 7–25)
BUN/CREAT SERPL: 26.3 (ref 7–25)
BUN/CREAT SERPL: 26.3 (ref 7–25)
BUN/CREAT SERPL: 27.1 (ref 7–25)
CALCIUM SPEC-SCNC: 7.2 MG/DL (ref 8.6–10.5)
CALCIUM SPEC-SCNC: 7.3 MG/DL (ref 8.6–10.5)
CALCIUM SPEC-SCNC: 7.4 MG/DL (ref 8.6–10.5)
CALCIUM SPEC-SCNC: 7.4 MG/DL (ref 8.6–10.5)
CALCIUM SPEC-SCNC: 7.5 MG/DL (ref 8.6–10.5)
CALCIUM SPEC-SCNC: 7.5 MG/DL (ref 8.6–10.5)
CHLORIDE SERPL-SCNC: 104 MMOL/L (ref 98–107)
CHLORIDE SERPL-SCNC: 112 MMOL/L (ref 98–107)
CHLORIDE SERPL-SCNC: 114 MMOL/L (ref 98–107)
CHLORIDE SERPL-SCNC: 115 MMOL/L (ref 98–107)
CHLORIDE SERPL-SCNC: 116 MMOL/L (ref 98–107)
CHLORIDE SERPL-SCNC: 99 MMOL/L (ref 98–107)
CO2 SERPL-SCNC: 10 MMOL/L (ref 22–29)
CO2 SERPL-SCNC: 12 MMOL/L (ref 22–29)
CO2 SERPL-SCNC: 13 MMOL/L (ref 22–29)
CO2 SERPL-SCNC: 15 MMOL/L (ref 22–29)
CREAT BLD-MCNC: 1.63 MG/DL (ref 0.57–1)
CREAT BLD-MCNC: 1.68 MG/DL (ref 0.57–1)
CREAT BLD-MCNC: 1.69 MG/DL (ref 0.57–1)
CREAT BLD-MCNC: 1.71 MG/DL (ref 0.57–1)
CREAT BLD-MCNC: 1.86 MG/DL (ref 0.57–1)
CREAT BLD-MCNC: 1.88 MG/DL (ref 0.57–1)
DEPRECATED RDW RBC AUTO: 42.6 FL (ref 37–54)
EOSINOPHIL # BLD AUTO: 0 10*3/MM3 (ref 0–0.4)
EOSINOPHIL NFR BLD AUTO: 0 % (ref 0.3–6.2)
ERYTHROCYTE [DISTWIDTH] IN BLOOD BY AUTOMATED COUNT: 13.4 % (ref 12.3–15.4)
GFR SERPL CREATININE-BSD FRML MDRD: 28 ML/MIN/1.73
GFR SERPL CREATININE-BSD FRML MDRD: 29 ML/MIN/1.73
GFR SERPL CREATININE-BSD FRML MDRD: 32 ML/MIN/1.73
GFR SERPL CREATININE-BSD FRML MDRD: 33 ML/MIN/1.73
GLOBULIN UR ELPH-MCNC: 2.9 GM/DL
GLUCOSE BLD-MCNC: 169 MG/DL (ref 65–99)
GLUCOSE BLD-MCNC: 216 MG/DL (ref 65–99)
GLUCOSE BLD-MCNC: 230 MG/DL (ref 65–99)
GLUCOSE BLD-MCNC: 252 MG/DL (ref 65–99)
GLUCOSE BLD-MCNC: 426 MG/DL (ref 65–99)
GLUCOSE BLD-MCNC: 624 MG/DL (ref 65–99)
GLUCOSE BLDC GLUCOMTR-MCNC: 136 MG/DL (ref 70–130)
GLUCOSE BLDC GLUCOMTR-MCNC: 138 MG/DL (ref 70–130)
GLUCOSE BLDC GLUCOMTR-MCNC: 150 MG/DL (ref 70–130)
GLUCOSE BLDC GLUCOMTR-MCNC: 164 MG/DL (ref 70–130)
GLUCOSE BLDC GLUCOMTR-MCNC: 185 MG/DL (ref 70–130)
GLUCOSE BLDC GLUCOMTR-MCNC: 199 MG/DL (ref 70–130)
GLUCOSE BLDC GLUCOMTR-MCNC: 203 MG/DL (ref 70–130)
GLUCOSE BLDC GLUCOMTR-MCNC: 207 MG/DL (ref 70–130)
GLUCOSE BLDC GLUCOMTR-MCNC: 217 MG/DL (ref 70–130)
GLUCOSE BLDC GLUCOMTR-MCNC: 242 MG/DL (ref 70–130)
GLUCOSE BLDC GLUCOMTR-MCNC: 258 MG/DL (ref 70–130)
GLUCOSE BLDC GLUCOMTR-MCNC: 268 MG/DL (ref 70–130)
GLUCOSE BLDC GLUCOMTR-MCNC: 272 MG/DL (ref 70–130)
GLUCOSE BLDC GLUCOMTR-MCNC: 305 MG/DL (ref 70–130)
GLUCOSE BLDC GLUCOMTR-MCNC: 411 MG/DL (ref 70–130)
GLUCOSE BLDC GLUCOMTR-MCNC: 430 MG/DL (ref 70–130)
HCT VFR BLD AUTO: 28.3 % (ref 34–46.6)
HGB BLD-MCNC: 9.7 G/DL (ref 12–15.9)
HOLD SPECIMEN: NORMAL
IMM GRANULOCYTES # BLD AUTO: 0.06 10*3/MM3 (ref 0–0.05)
IMM GRANULOCYTES NFR BLD AUTO: 1.2 % (ref 0–0.5)
LYMPHOCYTES # BLD AUTO: 0.6 10*3/MM3 (ref 0.7–3.1)
LYMPHOCYTES NFR BLD AUTO: 11.9 % (ref 19.6–45.3)
MAGNESIUM SERPL-MCNC: 1.6 MG/DL (ref 1.6–2.6)
MAGNESIUM SERPL-MCNC: 1.7 MG/DL (ref 1.6–2.6)
MAGNESIUM SERPL-MCNC: 1.7 MG/DL (ref 1.6–2.6)
MCH RBC QN AUTO: 29.8 PG (ref 26.6–33)
MCHC RBC AUTO-ENTMCNC: 34.3 G/DL (ref 31.5–35.7)
MCV RBC AUTO: 86.8 FL (ref 79–97)
MONOCYTES # BLD AUTO: 0.42 10*3/MM3 (ref 0.1–0.9)
MONOCYTES NFR BLD AUTO: 8.3 % (ref 5–12)
NEUTROPHILS # BLD AUTO: 3.95 10*3/MM3 (ref 1.7–7)
NEUTROPHILS NFR BLD AUTO: 78.2 % (ref 42.7–76)
NRBC BLD AUTO-RTO: 0.4 /100 WBC (ref 0–0.2)
PHOSPHATE SERPL-MCNC: 1.2 MG/DL (ref 2.5–4.5)
PHOSPHATE SERPL-MCNC: 1.6 MG/DL (ref 2.5–4.5)
PHOSPHATE SERPL-MCNC: 2.2 MG/DL (ref 2.5–4.5)
PHOSPHATE SERPL-MCNC: 2.5 MG/DL (ref 2.5–4.5)
PHOSPHATE SERPL-MCNC: 2.6 MG/DL (ref 2.5–4.5)
PHOSPHATE SERPL-MCNC: 2.8 MG/DL (ref 2.5–4.5)
PLATELET # BLD AUTO: 194 10*3/MM3 (ref 140–450)
PMV BLD AUTO: 9.7 FL (ref 6–12)
POTASSIUM BLD-SCNC: 3.1 MMOL/L (ref 3.5–5.2)
POTASSIUM BLD-SCNC: 3.4 MMOL/L (ref 3.5–5.2)
POTASSIUM BLD-SCNC: 3.9 MMOL/L (ref 3.5–5.2)
POTASSIUM BLD-SCNC: 4.8 MMOL/L (ref 3.5–5.2)
POTASSIUM BLD-SCNC: 5.2 MMOL/L (ref 3.5–5.2)
POTASSIUM BLD-SCNC: 5.3 MMOL/L (ref 3.5–5.2)
PROT SERPL-MCNC: 5.1 G/DL (ref 6–8.5)
RBC # BLD AUTO: 3.26 10*6/MM3 (ref 3.77–5.28)
SODIUM BLD-SCNC: 130 MMOL/L (ref 136–145)
SODIUM BLD-SCNC: 134 MMOL/L (ref 136–145)
SODIUM BLD-SCNC: 140 MMOL/L (ref 136–145)
SODIUM BLD-SCNC: 142 MMOL/L (ref 136–145)
SODIUM BLD-SCNC: 142 MMOL/L (ref 136–145)
SODIUM BLD-SCNC: 143 MMOL/L (ref 136–145)
TSH SERPL DL<=0.05 MIU/L-ACNC: 1.22 UIU/ML (ref 0.27–4.2)
WBC NRBC COR # BLD: 5.05 10*3/MM3 (ref 3.4–10.8)

## 2020-05-12 PROCEDURE — 80048 BASIC METABOLIC PNL TOTAL CA: CPT | Performed by: HOSPITALIST

## 2020-05-12 PROCEDURE — 25010000002 LORAZEPAM PER 2 MG: Performed by: INTERNAL MEDICINE

## 2020-05-12 PROCEDURE — 80050 GENERAL HEALTH PANEL: CPT | Performed by: HOSPITALIST

## 2020-05-12 PROCEDURE — 83735 ASSAY OF MAGNESIUM: CPT | Performed by: HOSPITALIST

## 2020-05-12 PROCEDURE — 82962 GLUCOSE BLOOD TEST: CPT

## 2020-05-12 PROCEDURE — 25010000003 INSULIN REGULAR HUMAN PER 5 UNITS: Performed by: HOSPITALIST

## 2020-05-12 PROCEDURE — 25810000003 SODIUM CHLORIDE 0.9 % WITH KCL 40 MEQ/L 40-0.9 MEQ/L-% SOLUTION: Performed by: HOSPITALIST

## 2020-05-12 PROCEDURE — 25010000002 ENOXAPARIN PER 10 MG: Performed by: HOSPITALIST

## 2020-05-12 PROCEDURE — 84100 ASSAY OF PHOSPHORUS: CPT | Performed by: HOSPITALIST

## 2020-05-12 PROCEDURE — 25010000002 CEFTRIAXONE PER 250 MG: Performed by: INTERNAL MEDICINE

## 2020-05-12 PROCEDURE — 25010000002 HYDROMORPHONE 1 MG/ML SOLUTION: Performed by: HOSPITALIST

## 2020-05-12 RX ORDER — LORAZEPAM 2 MG/ML
1 INJECTION INTRAMUSCULAR EVERY 4 HOURS PRN
Status: DISCONTINUED | OUTPATIENT
Start: 2020-05-12 | End: 2020-05-13

## 2020-05-12 RX ORDER — NICOTINE 21 MG/24HR
1 PATCH, TRANSDERMAL 24 HOURS TRANSDERMAL
Status: DISCONTINUED | OUTPATIENT
Start: 2020-05-12 | End: 2020-05-18 | Stop reason: HOSPADM

## 2020-05-12 RX ADMIN — CEFTRIAXONE SODIUM 1 G: 1 INJECTION, POWDER, FOR SOLUTION INTRAMUSCULAR; INTRAVENOUS at 10:26

## 2020-05-12 RX ADMIN — SODIUM CHLORIDE 10 UNITS/HR: 9 INJECTION, SOLUTION INTRAVENOUS at 09:17

## 2020-05-12 RX ADMIN — POTASSIUM CHLORIDE AND SODIUM CHLORIDE 250 ML/HR: 900; 300 INJECTION, SOLUTION INTRAVENOUS at 08:08

## 2020-05-12 RX ADMIN — Medication 50 MEQ: at 00:14

## 2020-05-12 RX ADMIN — HYDROMORPHONE HYDROCHLORIDE 0.5 MG: 1 INJECTION, SOLUTION INTRAMUSCULAR; INTRAVENOUS; SUBCUTANEOUS at 10:47

## 2020-05-12 RX ADMIN — LORAZEPAM 1 MG: 2 INJECTION INTRAMUSCULAR; INTRAVENOUS at 21:22

## 2020-05-12 RX ADMIN — SODIUM CHLORIDE 250 ML/HR: 900 INJECTION, SOLUTION INTRAVENOUS at 00:58

## 2020-05-12 RX ADMIN — HYDROMORPHONE HYDROCHLORIDE 0.5 MG: 1 INJECTION, SOLUTION INTRAMUSCULAR; INTRAVENOUS; SUBCUTANEOUS at 14:29

## 2020-05-12 RX ADMIN — NICOTINE 1 PATCH: 21 PATCH, EXTENDED RELEASE TRANSDERMAL at 10:47

## 2020-05-12 RX ADMIN — POTASSIUM CHLORIDE AND SODIUM CHLORIDE 250 ML/HR: 900; 300 INJECTION, SOLUTION INTRAVENOUS at 04:15

## 2020-05-12 RX ADMIN — ENOXAPARIN SODIUM 30 MG: 30 INJECTION SUBCUTANEOUS at 23:59

## 2020-05-12 RX ADMIN — SODIUM CHLORIDE, PRESERVATIVE FREE 10 ML: 5 INJECTION INTRAVENOUS at 23:31

## 2020-05-12 RX ADMIN — POTASSIUM PHOSPHATE, MONOBASIC AND POTASSIUM PHOSPHATE, DIBASIC 30 MMOL: 224; 236 INJECTION, SOLUTION, CONCENTRATE INTRAVENOUS at 10:25

## 2020-05-12 RX ADMIN — HYDROMORPHONE HYDROCHLORIDE 0.5 MG: 1 INJECTION, SOLUTION INTRAMUSCULAR; INTRAVENOUS; SUBCUTANEOUS at 06:08

## 2020-05-12 RX ADMIN — SODIUM BICARBONATE 50 MEQ: 84 INJECTION INTRAVENOUS at 00:14

## 2020-05-12 RX ADMIN — SODIUM CHLORIDE 15 UNITS/HR: 9 INJECTION, SOLUTION INTRAVENOUS at 02:30

## 2020-05-12 RX ADMIN — HYDROMORPHONE HYDROCHLORIDE 0.5 MG: 1 INJECTION, SOLUTION INTRAMUSCULAR; INTRAVENOUS; SUBCUTANEOUS at 17:51

## 2020-05-12 RX ADMIN — SODIUM CHLORIDE, PRESERVATIVE FREE 3 ML: 5 INJECTION INTRAVENOUS at 08:07

## 2020-05-12 RX ADMIN — POTASSIUM CHLORIDE, DEXTROSE MONOHYDRATE AND SODIUM CHLORIDE 150 ML/HR: 150; 5; 450 INJECTION, SOLUTION INTRAVENOUS at 17:51

## 2020-05-12 RX ADMIN — SODIUM CHLORIDE, PRESERVATIVE FREE 10 ML: 5 INJECTION INTRAVENOUS at 08:07

## 2020-05-12 RX ADMIN — FAMOTIDINE 20 MG: 10 INJECTION INTRAVENOUS at 08:07

## 2020-05-12 RX ADMIN — HYDROMORPHONE HYDROCHLORIDE 0.5 MG: 1 INJECTION, SOLUTION INTRAMUSCULAR; INTRAVENOUS; SUBCUTANEOUS at 20:40

## 2020-05-12 RX ADMIN — ENOXAPARIN SODIUM 30 MG: 30 INJECTION SUBCUTANEOUS at 00:23

## 2020-05-12 RX ADMIN — POTASSIUM CHLORIDE, DEXTROSE MONOHYDRATE AND SODIUM CHLORIDE 150 ML/HR: 150; 5; 450 INJECTION, SOLUTION INTRAVENOUS at 12:07

## 2020-05-12 NOTE — H&P
HISTORY AND PHYSICAL   [facility]        Patient Identification:  Name: Ingrid Ortiz  Age: 49 y.o.  Sex: female  :  1970  MRN: [id number]                     Primary Care Physician: Provider, No Known    Chief Complaint: Weakness and confusion with elevated blood sugar and hypothermia    History of Present Illness:       The patient is a 49-year-old white female with history of diabetes mellitus, GERD, hyperlipidemia, anxiety, tobacco abuse and noncompliance who was admitted with history of feeling extremely weak and with some confusion.  The patient was evaluated in the ER noted to have very elevated blood sugar of over thousand with diabetic ketoacidosis.  She appeared very dehydrated and her creatinine was elevated at 2.  White count was elevated at 25,000 and she was hypothermic on admission requiring Jarrod hugger to warm her up.  The patient was also hyponatremic with sodium of 114.  The patient was given some fluid boluses and some insulin and cultures were taken and she was started on broad-spectrum antibiotics in the ER.  Urinalysis showed changes suggesting possible UTI.  The patient is admitted for further evaluation treatment with DKA possible sepsis and other lab abnormalities as mentioned.    Past Medical History:  Past Medical History:   Diagnosis Date   • Diabetes (CMS/HCC)    • GERD (gastroesophageal reflux disease)    • HLD (hyperlipidemia)      Past Surgical History:  Past Surgical History:   Procedure Laterality Date   • CERVICAL DISC SURGERY     • CHOLECYSTECTOMY     • HYSTERECTOMY     • TUBAL ABDOMINAL LIGATION        Home Meds:    (Not in a hospital admission)  CURRENT MEDS    Current Facility-Administered Medications:   •  acetaminophen (TYLENOL) tablet 650 mg, 650 mg, Oral, Q4H PRN **OR** acetaminophen (TYLENOL) 160 MG/5ML solution 650 mg, 650 mg, Oral, Q4H PRN **OR** acetaminophen (TYLENOL) suppository 650 mg, 650 mg, Rectal, Q4H PRN, Noel Zamudio MD  •   dextrose (D50W) 25 g/ 50mL Intravenous Solution 12.5 g, 12.5 g, Intravenous, PRN, Noel Zamudio MD  •  dextrose 5 % and sodium chloride 0.45 % infusion, 150 mL/hr, Intravenous, Continuous PRN, Noel Zamudio MD  •  dextrose 5 % and sodium chloride 0.45 % with KCl 20 mEq/L infusion, 150 mL/hr, Intravenous, Continuous PRN, Noel Zamudio MD  •  dextrose 5 % and sodium chloride 0.45 % with KCl 40 mEq/L infusion, 150 mL/hr, Intravenous, Continuous PRN, Noel Zamudio MD  •  dextrose 5 % and sodium chloride 0.9 % infusion, 150 mL/hr, Intravenous, Continuous PRNRobb Lyle E, MD  •  dextrose 5 % and sodium chloride 0.9 % with KCl 20 mEq/L infusion, 150 mL/hr, Intravenous, Continuous PRN, Noel Zamudio MD  •  dextrose 5 % and sodium chloride 0.9 % with KCl 40 mEq/L infusion, 150 mL/hr, Intravenous, Continuous PRN, Noel Zamudio MD  •  enoxaparin (LOVENOX) syringe 30 mg, 30 mg, Subcutaneous, Q24H, Noel Zamudio MD  •  insulin regular (HumuLIN R,NovoLIN R) 100 Units in sodium chloride 0.9 % 100 mL (1 Units/mL) infusion, 6 Units/hr, Intravenous, Titrated, Heriberto Springer PA-C, Last Rate: 6 mL/hr at 05/11/20 2000, 6 Units/hr at 05/11/20 2000  •  insulin regular (HumuLIN R,NovoLIN R) 100 Units in sodium chloride 0.9 % 100 mL (1 Units/mL) infusion, 6 Units/hr, Intravenous, Titrated, Noel Zamudio MD  •  ondansetron (ZOFRAN) tablet 4 mg, 4 mg, Oral, Q6H PRN **OR** ondansetron (ZOFRAN) injection 4 mg, 4 mg, Intravenous, Q6H PRN, Noel Zamudio MD  •  Pharmacy to Dose Zosyn, , Does not apply, Continuous PRN, Noel Zamudio MD  •  sodium chloride 0.45 % 1,000 mL with potassium chloride 40 mEq infusion, 250 mL/hr, Intravenous, Continuous PRN, Noel Zamudio MD  •  sodium chloride 0.45 % infusion, 250 mL/hr, Intravenous, Continuous PRN, Noel Zamudio MD  •  sodium chloride 0.45 % with KCl 20 mEq/L infusion, 250 mL/hr, Intravenous, Continuous PRN, Noel Zamudio MD  •  sodium chloride 0.9 % bolus 2,000 mL, 2,000 mL,  Intravenous, Once, Noel Zamudio MD  •  sodium chloride 0.9 % bolus 2,000 mL, 2,000 mL, Intravenous, Once, Noel Zamudio MD  •  [COMPLETED] Insert peripheral IV, , , Once **AND** sodium chloride 0.9 % flush 10 mL, 10 mL, Intravenous, PRN, Heriberto Springer PA-C  •  sodium chloride 0.9 % flush 10 mL, 10 mL, Intravenous, PRNRobb Lyle E, MD  •  sodium chloride 0.9 % flush 10 mL, 10 mL, Intravenous, Once PRN, Noel Zamudio MD  •  sodium chloride 0.9 % flush 10 mL, 10 mL, Intravenous, Q12H, Noel Zamudio MD  •  sodium chloride 0.9 % flush 10 mL, 10 mL, Intravenous, PRN, Noel Zamudio MD  •  sodium chloride 0.9 % flush 3 mL, 3 mL, Intravenous, Q12H, Noel Zamudio MD  •  sodium chloride 0.9 % flush 30 mL, 30 mL, Intravenous, Once PRN, Heriberto Springer PA-C  •  sodium chloride 0.9 % infusion, 30 mL/hr, Intravenous, Continuous PRN, Heriberto Springer PA-C  •  sodium chloride 0.9 % infusion, 250 mL/hr, Intravenous, Continuous PRNRobb Lyle E, MD  •  sodium chloride 0.9 % infusion, 10 mL/hr, Intravenous, Continuous Robb MARION Lyle E, MD  •  sodium chloride 0.9 % infusion, 250 mL/hr, Intravenous, Continuous, Noel Zamudio MD  •  sodium chloride 0.9 % with KCl 20 mEq/L infusion, 250 mL/hr, Intravenous, Continuous PRNRobb Lyle E, MD  •  sodium chloride 0.9 % with KCl 40 mEq/L infusion, 250 mL/hr, Intravenous, Continuous PRNRobb Lyle E, MD    Current Outpatient Medications:   •  albuterol sulfate  (90 Base) MCG/ACT inhaler, Inhale 1 puff Every 6 (Six) Hours As Needed., Disp: , Rfl:   •  busPIRone (BUSPAR) 10 MG tablet, Take 10 mg by mouth., Disp: , Rfl:   •  Continuous Blood Gluc Sensor (FREESTYLE RAMON 14 DAY SENSOR) misc, CHANGE EVERY 14 DAYS, Disp: , Rfl:   •  cyclobenzaprine (FLEXERIL) 10 MG tablet, 1 tablet., Disp: , Rfl:   •  estradiol (ESTRACE) 2 MG tablet, TAKE 1 TABLET BY MOUTH ONCE DAILY, Disp: , Rfl:   •  fluticasone (FLONASE) 50 MCG/ACT nasal spray, 1 spray into the nostril(s)  "as directed by provider Daily., Disp: , Rfl:   •  gabapentin (NEURONTIN) 800 MG tablet, Take 800 mg by mouth 3 (Three) Times a Day., Disp: , Rfl:   •  glucose blood (OneTouch Verio) test strip, 1 strip 3 (Three) Times a Day., Disp: , Rfl:   •  ibuprofen (ADVIL,MOTRIN) 800 MG tablet, Take 800 mg by mouth., Disp: , Rfl:   •  Insulin Glargine (BASAGLAR KWIKPEN) 100 UNIT/ML injection pen, Inject 40 Units under the skin into the appropriate area as directed Daily., Disp: , Rfl:   •  Insulin Lispro, 1 Unit Dial, (HUMALOG) 100 UNIT/ML solution pen-injector, Inject 7 units plus sliding scale into the skin TID with meals, Disp: , Rfl:   •  Insulin Pen Needle 31G X 8 MM misc, use as directed, Disp: , Rfl:   •  Insulin Syringe-Needle U-100 (UltiCare Insulin Syringe) 30G X 1/2\" 0.3 ML misc, , Disp: , Rfl:   •  ipratropium-albuterol (DUO-NEB) 0.5-2.5 mg/3 ml nebulizer, , Disp: , Rfl:   •  Lancets misc, , Disp: , Rfl:   •  levocetirizine (XYZAL) 5 MG tablet, Take 5 mg by mouth Daily., Disp: , Rfl:   •  omeprazole (priLOSEC) 20 MG capsule, Take 20 mg by mouth Daily., Disp: , Rfl:   •  ondansetron (ZOFRAN) 4 MG tablet, Take 4 mg by mouth., Disp: , Rfl:   •  pramipexole (MIRAPEX) 0.5 MG tablet, Take 1 mg by mouth Daily., Disp: , Rfl:   •  raNITIdine (ZANTAC) 150 MG capsule, Take 150 mg by mouth., Disp: , Rfl:   •  rOPINIRole XL (REQUIP XL) 8 MG 24 hr tablet, Take 3 mg by mouth., Disp: , Rfl:   •  simvastatin (ZOCOR) 20 MG tablet, Take 20 mg by mouth Daily., Disp: , Rfl:   •  SITagliptin (JANUVIA) 50 MG tablet, Take 50 mg by mouth Daily., Disp: , Rfl:   •  venlafaxine XR (EFFEXOR-XR) 75 MG 24 hr capsule, Take 75 mg by mouth Daily., Disp: , Rfl:   •  traZODone (DESYREL) 100 MG tablet, Take 100 mg by mouth., Disp: , Rfl:   Allergies:  Allergies   Allergen Reactions   • Codeine Itching and Nausea And Vomiting   • Hydrocodone-Acetaminophen Itching and Nausea And Vomiting   • Morphine Nausea And Vomiting     Immunizations:    There is no " "immunization history on file for this patient.  Social History:   Social History     Social History Narrative   • Not on file     Social History     Socioeconomic History   • Marital status:      Spouse name: Not on file   • Number of children: Not on file   • Years of education: Not on file   • Highest education level: Not on file   Tobacco Use   • Smoking status: Current Every Day Smoker   Substance and Sexual Activity   • Alcohol use: Yes   • Drug use: Not Currently   • Sexual activity: Defer       Family History:  Family History   Problem Relation Age of Onset   • Diabetes Brother         Review of Systems  See history of present illness and past medical history.  Patient denies headache, dizziness, syncope, falls, trauma, change in vision, change in hearing, change in taste, changes in weight, changes in appetite, focal weakness, numbness, or paresthesia.  Patient denies chest pain, palpitations, dyspnea, orthopnea, PND, cough, sinus pressure, rhinorrhea, epistaxis, hemoptysis,  vomiting,hematemesis, diarrhea, constipation or hematchezia.  Denies cold or heat intolerance, polydipsia, polyuria, polyphagia.  Remainder of ROS is negative.    Objective:  tMax 24 hrs: Temp (24hrs), Av.4 °F (34.1 °C), Min:91.5 °F (33.1 °C), Max:94.8 °F (34.9 °C)    Vitals Ranges:   Temp:  [91.5 °F (33.1 °C)-94.8 °F (34.9 °C)] 94.8 °F (34.9 °C)  Heart Rate:  [] 103  Resp:  [16-40] 40  BP: (110-146)/(59-72) 124/72      Exam:  /72 (BP Location: Right arm, Patient Position: Lying)   Pulse 103   Temp 94.8 °F (34.9 °C) (Rectal)   Resp (!) 40   Ht 167.6 cm (66\")   Wt 63.3 kg (139 lb 9.6 oz)   SpO2 97%   BMI 22.53 kg/m²     General Appearance:   Confused, no distress, appears stated age   Head:    Normocephalic, without obvious abnormality, atraumatic   Eyes:    PERRL, conjunctiva/corneas clear, EOM's intact, both eyes   Ears:    Normal external ear canals, both ears   Nose:   Nares normal, septum midline, " mucosa normal, no drainage    or sinus tenderness   Throat:   Lips, mucosa, and tongue very dry and appears dehydrated   Neck:   Supple, symmetrical, trachea midline, no adenopathy;     thyroid:  no enlargement/tenderness/nodules; no carotid    bruit or JVD   Back:     Symmetric, no curvature, ROM normal, no CVA tenderness   Lungs:     Clear to auscultation bilaterally, respirations unlabored   Chest Wall:    No tenderness or deformity    Heart:    Regular rate and rhythm, S1 and S2 normal, no murmur, rub   or gallop   Abdomen:     Soft, non-tender, bowel sounds active all four quadrants,     no masses, no hepatomegaly, no splenomegaly   Extremities:   Extremities normal, atraumatic, no cyanosis or edema   Pulses:   2+ and symmetric all extremities   Skin:   Skin color, texture, turgor normal, no rashes or lesions   Lymph nodes:   Cervical, supraclavicular, and axillary nodes normal   Neurologic:   CNII-XII intact, normal strength, sensation intact throughout, confused      .    Data Review:  Lab Results (last 72 hours)     Procedure Component Value Units Date/Time    Ethanol [[id number]] Collected:  05/11/20 1803    Specimen:  Blood Updated:  05/11/20 2119     Ethanol <10 mg/dL      Ethanol % <0.010 %     Blood Gas, Arterial [[id number]]  (Abnormal) Collected:  05/11/20 2042    Specimen:  Arterial Blood Updated:  05/11/20 2052     Site Left Brachial     Daniel's Test N/A     pH, Arterial 6.828 pH units      Comment: 85 Value below critical limit        pCO2, Arterial 11.7 mm Hg      Comment: 85 Value below critical limit        pO2, Arterial 145.0 mm Hg      Comment: 83 Value above reference range        HCO3, Arterial 1.9 mmol/L      Comment: 84 Value below reference range        Base Excess, Arterial -30.6 mmol/L      Comment:  The parameter value has a question [id number] Base Excess out of range84 Value below reference range        O2 Saturation, Arterial 98.4 %      Barometric Pressure for Blood Gas 753 mmHg       Modality Room Air     FIO2 21 %      Ventilator Mode NA     Collected by PW     Comment: Meter: [id number]     :  [id number]       Urinalysis, Microscopic Only - Urine, Clean Catch [[id number]]  (Abnormal) Collected:  05/11/20 2003    Specimen:  Urine, Clean Catch Updated:  05/11/20 2030     RBC, UA 0-2 /HPF      WBC, UA 13-20 /HPF      Bacteria, UA Trace /HPF      Squamous Epithelial Cells, UA 3-5 /HPF      Hyaline Casts, UA 7-12 /LPF      Amorphous Crystals, UA Small/1+ /HPF      Methodology Manual Light Microscopy    Urine Drug Screen - Urine, Clean Catch [[id number]]  (Normal) Collected:  05/11/20 2003    Specimen:  Urine, Clean Catch Updated:  05/11/20 2023     THC, Screen, Urine Negative     Phencyclidine (PCP), Urine Negative     Cocaine Screen, Urine Negative     Methamphetamine, Ur Negative     Opiate Screen Negative     Amphetamine Screen, Urine Negative     Benzodiazepine Screen, Urine Negative     Tricyclic Antidepressants Screen Negative     Methadone Screen, Urine Negative     Barbiturates Screen, Urine Negative     Oxycodone Screen, Urine Negative     Propoxyphene Screen Negative     Buprenorphine, Screen, Urine Negative    Narrative:       Cutoff For Drugs Screened:    Amphetamines               500 ng/ml  Barbiturates               200 ng/ml  Benzodiazepines            150 ng/ml  Cocaine                    150 ng/ml  Methadone                  200 ng/ml  Opiates                    100 ng/ml  Phencyclidine               25 ng/ml  THC                            50 ng/ml  Methamphetamine            500 ng/ml  Tricyclic Antidepressants  300 ng/ml  Oxycodone                  100 ng/ml  Propoxyphene               300 ng/ml  Buprenorphine               10 ng/ml    The normal value for all drugs tested is negative. This report includes unconfirmed screening results, with the cutoff values listed, to be used for medical treatment purposes only.  Unconfirmed results must not be used for non-medical  purposes such as employment or legal testing.  Clinical consideration should be applied to any drug of abuse test, particularly when unconfirmed results are used.      Urinalysis With Microscopic If Indicated (No Culture) - Urine, Clean Catch [[id number]]  (Abnormal) Collected:  05/11/20 2003    Specimen:  Urine, Clean Catch Updated:  05/11/20 2019     Color, UA Yellow     Appearance, UA Cloudy     pH, UA <=5.0     Specific Gravity, UA 1.020     Glucose, UA >=1000 mg/dL (3+)     Ketones, UA 80 mg/dL (3+)     Bilirubin, UA Negative     Blood, UA Moderate (2+)     Protein, UA 30 mg/dL (1+)     Leuk Esterase, UA Negative     Nitrite, UA Negative     Urobilinogen, UA 0.2 E.U./dL    hCG, Serum, Qualitative [[id number]]  (Normal) Collected:  05/11/20 1939    Specimen:  Blood Updated:  05/11/20 1947     HCG Qualitative Negative    Lactic Acid, Plasma [[id number]]  (Abnormal) Collected:  05/11/20 1918    Specimen:  Blood Updated:  05/11/20 1946     Lactate 3.8 mmol/L     Lactic Acid, Reflex Timer (This will reflex a repeat order 3-3:15 hours after ordered.) [[id number]] Collected:  05/11/20 1918    Specimen:  Blood Updated:  05/11/20 1946    Blood Culture - Blood, Wrist, Right [[id number]] Collected:  05/11/20 1918    Specimen:  Blood from Wrist, Right Updated:  05/11/20 1924    Extra Tubes [[id number]] Collected:  05/11/20 1812    Specimen:  Blood, Venous Line Updated:  05/11/20 1915    Narrative:       The following orders were created for panel order Extra Tubes.  Procedure                               Abnormality         Status                     ---------                               -----------         ------                     Light Blue Top[[id number]]                                   Final result                 Please view results for these tests on the individual orders.    Light Blue Top [[id number]] Collected:  05/11/20 1812    Specimen:  Blood Updated:  05/11/20 1915     Extra Tube hold for add-on     Comment:  Auto resulted       Comprehensive Metabolic Panel [[id number]]  (Abnormal) Collected:  05/11/20 1803    Specimen:  Blood Updated:  05/11/20 1851     Glucose 1,124 mg/dL      BUN 49 mg/dL      Creatinine 2.03 mg/dL      Sodium 114 mmol/L      Potassium 6.4 mmol/L      Chloride 76 mmol/L      CO2 3.0 mmol/L      Calcium 8.6 mg/dL      Total Protein 6.4 g/dL      Albumin 2.70 g/dL      ALT (SGPT) 12 U/L      AST (SGOT) 20 U/L      Comment: Specimen hemolyzed.  Results may be affected.        Alkaline Phosphatase 268 U/L      Total Bilirubin 0.3 mg/dL      eGFR Non African Amer 26 mL/min/1.73      Globulin 3.7 gm/dL      A/G Ratio 0.7 g/dL      BUN/Creatinine Ratio 24.1     Anion Gap 35.0 mmol/L     Narrative:       GFR Normal >60  Chronic Kidney Disease <60  Kidney Failure <15      Manual Differential [[id number]]  (Abnormal) Collected:  05/11/20 1803    Specimen:  Blood Updated:  05/11/20 1842     Neutrophil % 91.0 %      Lymphocyte % 1.0 %      Monocyte % 5.0 %      Bands %  2.0 %      Metamyelocyte % 1.0 %      Neutrophils Absolute 23.83 10*3/mm3      Lymphocytes Absolute 0.26 10*3/mm3      Monocytes Absolute 1.28 10*3/mm3      Dacrocytes Slight/1+     WBC Morphology Normal     Platelet Morphology Normal    Magnesium [[id number]]  (Normal) Collected:  05/11/20 1803    Specimen:  Blood Updated:  05/11/20 1835     Magnesium 2.6 mg/dL     Lipase [[id number]]  (Abnormal) Collected:  05/11/20 1803    Specimen:  Blood Updated:  05/11/20 1835     Lipase 12 U/L     Acetone [[id number]]  (Abnormal) Collected:  05/11/20 1803    Specimen:  Blood Updated:  05/11/20 1828     Acetone Small    CBC & Differential [[id number]] Collected:  05/11/20 1803    Specimen:  Blood Updated:  05/11/20 1820    Narrative:       The following orders were created for panel order CBC & Differential.  Procedure                               Abnormality         Status                     ---------                               -----------         ------                      CBC Auto Differential[[id number]]        Abnormal            Final result                 Please view results for these tests on the individual orders.    CBC Auto Differential [[id number]]  (Abnormal) Collected:  05/11/20 1803    Specimen:  Blood Updated:  05/11/20 1820     WBC 25.62 10*3/mm3      RBC 3.57 10*6/mm3      Hemoglobin 10.9 g/dL      Hematocrit 38.9 %      .0 fL      MCH 30.5 pg      MCHC 28.0 g/dL      RDW 15.4 %      RDW-SD 63.2 fl      MPV 10.3 fL      Platelets 336 10*3/mm3                    Imaging Results (All)     Procedure Component Value Units Date/Time    CT Abdomen Pelvis Without Contrast [[id number]] Collected:  05/11/20 2007     Updated:  05/11/20 2035    Narrative:         CT Abdomen and Pelvis Without Contrast    History: Abdominal pain    Axials spiral scans of the abdomen and pelvis were obtained  without contrast.  Coronal and sagital reconstructions were  preformed.      This exam was performed according to our departmental  dose-optimization program, which includes automated exposure  control, adjustment of the mA and/or kV according to patient size  and/or use of iterative reconstruction technique.    DLP: 318.10    Comparison: None    Findings:   Scans of the lung bases demonstrate old granulomatous disease.    No acute osseous abnormality.  Degenerative changes and degenerative disc disease in the lumbar  spine.    The liver is unremarkable.  Cholecystectomy.  Splenic granulomata.  The pancreas is unremarkable.  The adrenal glands are unremarkable.    No renal or ureteral calculi.  No renal mass.  No hydronephrosis.    Unremarkable bladder.  No pelvic mass.  Hysterectomy.    No abdominal aortic aneurysm.  No adenopathy.    Moderate amount of fluid distending the stomach.  Minimally distended fluid-filled distal esophagus, question  gastroesophageal reflux.  Moderate amount retained feces in the colon.  Diverticulosis of the colon.  No bowel  obstruction.  Normal appendix.  No free air.  No free fluid.    No hernia.      Impression:       Conclusion:  Moderate amount of fluid distending the stomach.  Minimally distended fluid-filled distal esophagus, question  gastroesophageal reflux.  Moderate amount retained feces in the colon.  Diverticulosis of the colon.  Cholecystectomy.  Hysterectomy.    [id number]    Electronically signed by:  Michelet Lawrence MD  5/11/2020 8:34 PM CDT  Workstation: [employer]    XR Chest 1 View [[id number]] Collected:  05/11/20 1842     Updated:  05/11/20 1952    Narrative:         PORTABLE CHEST    HISTORY: Shortness of breath    Portable AP upright film of the chest was obtained at 7:29 PM.  COMPARISON: None    FINDINGS:   EKG leads.  The lungs are clear of an acute process.  Old granulomatous disease is present.  The heart is not enlarged.  The pulmonary vasculature is not increased.  No pleural effusion.  No pneumothorax.  No acute osseous abnormality.  Surgical clips right upper quadrant of the abdomen.      Impression:       CONCLUSION:  No Acute Disease    [id number]    Electronically signed by:  Michelet Lawrence MD  5/11/2020 7:51 PM CDT  Workstation: [employer]        Patient Active Problem List   Diagnosis Code   • Diabetic ketoacidosis without coma associated with diabetes mellitus due to underlying condition (CMS/[facility]) E08.10   • DARRELL (acute kidney injury) (CMS/[facility]) N17.9   • Metabolic acidosis E87.2   • HLD (hyperlipidemia) E78.5   • Tobacco abuse Z72.0   • Hyponatremia E87.1   • Dehydration E86.0   • Acute metabolic encephalopathy G93.41   • Anxiety F41.9   • Chronic obstructive pulmonary disease (CMS/[facility]) J44.9   • Diabetes mellitus (CMS/[facility]) E11.9   • Gastroesophageal reflux disease K21.9   • Non compliance with medical treatment Z91.19   • Hyponatremia E87.1   • Hypothermia T68.XXXA   • Leukocytosis D72.829   • Sepsis (CMS/[facility]) A41.9   • UTI (urinary tract infection) N39.0       Assessment:  Active Hospital Problems     Diagnosis  POA   • **Diabetic ketoacidosis without coma associated with diabetes mellitus due to underlying condition (CMS/[facility]) [E08.10]  Yes   • DARRELL (acute kidney injury) (CMS/[facility]) [N17.9]  Unknown   • Metabolic acidosis [E87.2]  Unknown   • HLD (hyperlipidemia) [E78.5]  Unknown   • Tobacco abuse [Z72.0]  Unknown   • Hyponatremia [E87.1]  Unknown   • Dehydration [E86.0]  Unknown   • Hypothermia [T68.XXXA]  Unknown   • Leukocytosis [D72.829]  Unknown   • Sepsis (CMS/[facility]) [A41.9]  Unknown   • UTI (urinary tract infection) [N39.0]  Unknown   • Anxiety [F41.9]  Yes   • Gastroesophageal reflux disease [K21.9]  Yes   • Acute metabolic encephalopathy [G93.41]  Yes      Resolved Hospital Problems   No resolved problems to display.       Plan:  The patient is admitted to the hospital and will go to critical care unit with DKA protocol.  We will continue with fluid resuscitation as she appears extremely volume depleted.  Will get follow-up on her electrolytes and replace as needed.  Continue broad-spectrum antibiotics and await results of cultures.  GI and DVT prophylaxis.  We will keep her n.p.o. and hold her home medications until she is cleared her DKA and is more alert and safe to take oral medications.    Noel Zamudio MD  5/11/2020  21:46

## 2020-05-12 NOTE — PLAN OF CARE
Problem: Patient Care Overview  Goal: Plan of Care Review  Outcome: Ongoing (interventions implemented as appropriate)  Flowsheets (Taken 5/12/2020 1202)  Progress: no change  Plan of Care Reviewed With: caregiver; patient  Outcome Summary: New Assessment:  Pt currently NPO on insulin drip.  Blood glucose 1124 on admit.  Will monitor po initiation and tolerance.  Will attempt education prior to discharge.

## 2020-05-12 NOTE — ED NOTES
Heriberto martin aware of blood gases no orders at this time      Stefanie Rodriguez RN  05/11/20 3787

## 2020-05-12 NOTE — PAYOR COMM NOTE
"Sophy Nikban  [facility]  (P)[phone]  (F)[phone]    **Notification Only**    Ingrid Ortiz (49 y.o. Female)     Date of Birth Social Security Number Address Home Phone MRN    1970  [address] [place] [address] [phone] [id number]    Scientologist Marital Status          Orthodoxy        Admission Date Admission Type Admitting Provider Attending Provider Department, Room/Bed    5/11/20 Emergency Noel Zamudio MD Beard, Lyle E, MD [facility] CRITICAL CARE STEPDOWN, 03/A    Discharge Date Discharge Disposition Discharge Destination                       Attending Provider:  Noel Zamudio MD    Allergies:  Codeine, Hydrocodone-acetaminophen, Morphine    Isolation:  None   Infection:  None   Code Status:  CPR    Ht:  167.6 cm (66\")   Wt:  71.4 kg (157 lb 6.5 oz)    Admission Cmt:  None   Principal Problem:  Diabetic ketoacidosis without coma associated with diabetes mellitus due to underlying condition (CMS/formerly [facility]) [E08.10]                 Active Insurance as of 5/11/2020     Primary Coverage     Payor Plan Insurance Group Employer/Plan Group    [employer] [place] [employer] [place]ICA[place]      Payor Plan Address Payor Plan Phone Number Payor Plan Fax Number Effective Dates    [address] [address] [phone]  5/11/2020 - None Entered    [facility] [id number]       Subscriber Name Subscriber Birth Date Member ID       INGRID ORTIZ 1970 [id number]                 Emergency Contacts      (Rel.) Home Phone Work Phone Mobile Phone    TU PORTER (Mother) [phone] -- [phone]              "

## 2020-05-12 NOTE — PROGRESS NOTES
[facility] Medicine Services  INPATIENT PROGRESS NOTE    Length of Stay: 1  Date of Admission: 5/11/2020  Primary Care Physician: Provider, No Known    Subjective   Chief Complaint: No new changes.    HPI:    The patient is a 49-year-old white female with history of diabetes mellitus, GERD, hyperlipidemia, anxiety, tobacco abuse and noncompliance who was admitted with history of feeling extremely weak and with some confusion.  The patient was evaluated in the ER noted to have very elevated blood sugar of over thousand with diabetic ketoacidosis.  She appeared very dehydrated and her creatinine was elevated at 2.  White count was elevated at 25,000 and she was hypothermic on admission requiring Jarord hugger to warm her up.  The patient was also hyponatremic with sodium of 114.  The patient was given some fluid boluses and some insulin and cultures were taken and she was started on broad-spectrum antibiotics in the ER.  Urinalysis showed changes suggesting possible UTI.    Patient is seen for follow-up today.  She remains confused but otherwise hemodynamically stable.    Review of Systems  Unable to review due to acuity of illness.  Objective    Temp:  [91.5 °F (33.1 °C)-94.8 °F (34.9 °C)] 94.8 °F (34.9 °C)  Heart Rate:  [] 92  Resp:  [16-40] 20  BP: (110-194)/(50-74) 115/63    Physical Exam   Constitutional: She appears well-developed and well-nourished. She is cooperative. No distress.   HENT:   Head: Normocephalic and atraumatic.   Right Ear: External ear normal.   Left Ear: External ear normal.   Nose: Nose normal.   Mouth/Throat: Oropharynx is clear and moist.   Eyes: Conjunctivae are normal.   Neck: Neck supple. No JVD present. No thyromegaly present.   Cardiovascular: Normal rate, regular rhythm, normal heart sounds and intact distal pulses. Exam reveals no gallop and no friction rub.   No murmur heard.  Pulmonary/Chest: Effort normal and breath sounds normal. No  stridor. No respiratory distress. She has no wheezes. She has no rales. She exhibits no tenderness.   Abdominal: Soft. Bowel sounds are normal. She exhibits no distension and no mass. There is no tenderness. There is no rebound and no guarding. No hernia.   Musculoskeletal: She exhibits no edema, tenderness or deformity.   Neurological: She has normal reflexes. She exhibits normal muscle tone.   She is arousable and oriented x1.   Skin: Skin is warm and dry. No rash noted. She is not diaphoretic. No erythema. No pallor.   Nursing note and vitals reviewed.        Medication Review:    Current Facility-Administered Medications:   •  acetaminophen (TYLENOL) tablet 650 mg, 650 mg, Oral, Q4H PRN **OR** [DISCONTINUED] acetaminophen (TYLENOL) 160 MG/5ML solution 650 mg, 650 mg, Oral, Q4H PRN **OR** acetaminophen (TYLENOL) suppository 650 mg, 650 mg, Rectal, Q4H PRN, Noel Zamudio MD  •  dextrose (D50W) 25 g/ 50mL Intravenous Solution 12.5 g, 12.5 g, Intravenous, PRN, Noel Zamudio MD  •  dextrose 5 % and sodium chloride 0.45 % infusion, 150 mL/hr, Intravenous, Continuous PRN, Noel Zamudio MD  •  dextrose 5 % and sodium chloride 0.45 % with KCl 20 mEq/L infusion, 150 mL/hr, Intravenous, Continuous PRN, Noel Zamudio MD  •  dextrose 5 % and sodium chloride 0.45 % with KCl 40 mEq/L infusion, 150 mL/hr, Intravenous, Continuous PRNRobb Lyle E, MD  •  dextrose 5 % and sodium chloride 0.9 % infusion, 150 mL/hr, Intravenous, Continuous PRN, Noel Zamudio MD  •  dextrose 5 % and sodium chloride 0.9 % with KCl 20 mEq/L infusion, 150 mL/hr, Intravenous, Continuous PRNRobb Lyle E, MD  •  dextrose 5 % and sodium chloride 0.9 % with KCl 40 mEq/L infusion, 150 mL/hr, Intravenous, Continuous PRN, Noel Zamudio MD  •  enoxaparin (LOVENOX) syringe 30 mg, 30 mg, Subcutaneous, Q24H, Noel Zamudio MD, 30 mg at 05/12/20 0023  •  famotidine (PEPCID) injection 20 mg, 20 mg, Intravenous, Daily, Noel Zamudio MD, 20 mg at 05/12/20  0807  •  HYDROmorphone (DILAUDID) injection 0.5 mg, 0.5 mg, Intravenous, Q2H PRN, Noel Zamudio MD, 0.5 mg at 05/12/20 0608  •  insulin regular (HumuLIN R,NovoLIN R) 100 Units in sodium chloride 0.9 % 100 mL (1 Units/mL) infusion, 6 Units/hr, Intravenous, Titrated, Noel Zamudio MD, Last Rate: 10 mL/hr at 05/12/20 0917, 10 Units/hr at 05/12/20 0917  •  ondansetron (ZOFRAN) tablet 4 mg, 4 mg, Oral, Q6H PRN **OR** ondansetron (ZOFRAN) injection 4 mg, 4 mg, Intravenous, Q6H PRN, Noel Zamudio MD  •  Pharmacy to Dose Zosyn, , Does not apply, Continuous PRN, Noel Zamudio MD  •  potassium phosphate 45 mmol in sodium chloride 0.9 % 250 mL infusion, 45 mmol, Intravenous, PRN **OR** potassium phosphate 30 mmol in sodium chloride 0.9 % 100 mL infusion, 30 mmol, Intravenous, PRN **OR** potassium phosphate 15 mmol in sodium chloride 0.9 % 100 mL infusion, 15 mmol, Intravenous, PRN **OR** sodium phosphates 45 mmol in sodium chloride 0.9 % 250 mL IVPB, 45 mmol, Intravenous, PRN **OR** sodium phosphates 30 mmol in sodium chloride 0.9 % 100 mL IVPB, 30 mmol, Intravenous, PRN **OR** sodium phosphates 15 mmol in sodium chloride 0.9 % 100 mL IVPB, 15 mmol, Intravenous, PRN, Misha Hart MD  •  sodium chloride 0.45 % 1,000 mL with potassium chloride 40 mEq infusion, 250 mL/hr, Intravenous, Continuous PRNRobb Lyle E, MD  •  sodium chloride 0.45 % infusion, 250 mL/hr, Intravenous, Continuous PRNRobb Lyle E, MD  •  sodium chloride 0.45 % with KCl 20 mEq/L infusion, 250 mL/hr, Intravenous, Continuous PRNRobb Lyle E, MD  •  [COMPLETED] Insert peripheral IV, , , Once **AND** sodium chloride 0.9 % flush 10 mL, 10 mL, Intravenous, PRN, Heriberto Springer PA-C  •  sodium chloride 0.9 % flush 10 mL, 10 mL, Intravenous, PRN, Noel Zamudio MD  •  sodium chloride 0.9 % flush 10 mL, 10 mL, Intravenous, Once PRN, Noel Zamudio MD  •  sodium chloride 0.9 % flush 10 mL, 10 mL, Intravenous, Q12H, Neol Zamudio MD, 10 mL at  05/12/20 0807  •  sodium chloride 0.9 % flush 10 mL, 10 mL, Intravenous, PRN, Noel Zamudio MD  •  sodium chloride 0.9 % flush 3 mL, 3 mL, Intravenous, Q12H, Noel Zamudio MD, 3 mL at 05/12/20 0807  •  sodium chloride 0.9 % flush 30 mL, 30 mL, Intravenous, Once PRN, Heriberto Springer PA-C  •  sodium chloride 0.9 % infusion, 30 mL/hr, Intravenous, Continuous PRN, Heriberto Springer PA-C  •  sodium chloride 0.9 % infusion, 250 mL/hr, Intravenous, Continuous PRN, Noel Zamudio MD  •  sodium chloride 0.9 % infusion, 10 mL/hr, Intravenous, Continuous PRNRobb Lyle E, MD  •  sodium chloride 0.9 % infusion, 250 mL/hr, Intravenous, Continuous, Noel Zamudio MD, Stopped at 05/12/20 0415  •  sodium chloride 0.9 % with KCl 20 mEq/L infusion, 250 mL/hr, Intravenous, Continuous PRNRobb Lyle E, MD  •  sodium chloride 0.9 % with KCl 40 mEq/L infusion, 250 mL/hr, Intravenous, Continuous PRRobb MAX Lyle E, MD, Last Rate: 250 mL/hr at 05/12/20 0808, 250 mL/hr at 05/12/20 0808    Results Review:  I have reviewed the labs, radiology results, and diagnostic studies.    Laboratory Data:   Results from last 7 days   Lab Units 05/12/20  0610 05/12/20  0241 05/11/20  2238 05/11/20  1803   SODIUM mmol/L 134* 130* 112* 114*   POTASSIUM mmol/L 3.4* 3.1* 5.2 6.4*   CHLORIDE mmol/L 104 99 77* 76*   CO2 mmol/L 13.0* 10.0* 3.0* 3.0*   BUN mg/dL 49* 51* 57* 49*   CREATININE mg/dL 1.86* 1.88* 2.35* 2.03*   GLUCOSE mg/dL 426* 624* 1,192* 1,124*   CALCIUM mg/dL 7.4* 7.2* 8.3* 8.6   BILIRUBIN mg/dL 0.2  --  0.3 0.3   ALK PHOS U/L 227*  --  280* 268*   ALT (SGPT) U/L 41*  --  20 12   AST (SGOT) U/L 97*  --  43* 20   ANION GAP mmol/L 17.0* 21.0* 32.0* 35.0*     Estimated Creatinine Clearance: 36.6 mL/min (A) (by C-G formula based on SCr of 1.86 mg/dL (H)).  Results from last 7 days   Lab Units 05/12/20  0610 05/12/20  0241 05/11/20  2238   MAGNESIUM mg/dL 1.6 1.7 2.4   PHOSPHORUS mg/dL 1.6* 2.6 6.9*         Results from last 7 days      Lab Units 05/12/20  0610 05/11/20  2238 05/11/20  1803   WBC 10*3/mm3 5.05 19.62* 25.62*   HEMOGLOBIN g/dL 9.7* 10.8* 10.9*   HEMATOCRIT % 28.3* 37.4 38.9   PLATELETS 10*3/mm3 194 288 336           Culture Data:   No results found for: BLOODCX  No results found for: URINECX  No results found for: RESPCX  No results found for: WOUNDCX  No results found for: STOOLCX  No components found for: BODYFLD    Radiology Data:   Imaging Results (Last 24 Hours)     Procedure Component Value Units Date/Time    CT Abdomen Pelvis Without Contrast [[id number]] Collected:  05/11/20 2007     Updated:  05/11/20 2035    Narrative:         CT Abdomen and Pelvis Without Contrast    History: Abdominal pain    Axials spiral scans of the abdomen and pelvis were obtained  without contrast.  Coronal and sagital reconstructions were  preformed.      This exam was performed according to our departmental  dose-optimization program, which includes automated exposure  control, adjustment of the mA and/or kV according to patient size  and/or use of iterative reconstruction technique.    DLP: 318.10    Comparison: None    Findings:   Scans of the lung bases demonstrate old granulomatous disease.    No acute osseous abnormality.  Degenerative changes and degenerative disc disease in the lumbar  spine.    The liver is unremarkable.  Cholecystectomy.  Splenic granulomata.  The pancreas is unremarkable.  The adrenal glands are unremarkable.    No renal or ureteral calculi.  No renal mass.  No hydronephrosis.    Unremarkable bladder.  No pelvic mass.  Hysterectomy.    No abdominal aortic aneurysm.  No adenopathy.    Moderate amount of fluid distending the stomach.  Minimally distended fluid-filled distal esophagus, question  gastroesophageal reflux.  Moderate amount retained feces in the colon.  Diverticulosis of the colon.  No bowel obstruction.  Normal appendix.  No free air.  No free fluid.    No hernia.      Impression:       Conclusion:  Moderate  amount of fluid distending the stomach.  Minimally distended fluid-filled distal esophagus, question  gastroesophageal reflux.  Moderate amount retained feces in the colon.  Diverticulosis of the colon.  Cholecystectomy.  Hysterectomy.    [id number]    Electronically signed by:  Michelet Lawrence MD  5/11/2020 8:34 PM CDT  Workstation: [employer]    XR Chest 1 View [[id number]] Collected:  05/11/20 1842     Updated:  05/11/20 1952    Narrative:         PORTABLE CHEST    HISTORY: Shortness of breath    Portable AP upright film of the chest was obtained at 7:29 PM.  COMPARISON: None    FINDINGS:   EKG leads.  The lungs are clear of an acute process.  Old granulomatous disease is present.  The heart is not enlarged.  The pulmonary vasculature is not increased.  No pleural effusion.  No pneumothorax.  No acute osseous abnormality.  Surgical clips right upper quadrant of the abdomen.      Impression:       CONCLUSION:  No Acute Disease    [id number]    Electronically signed by:  Michelet Lawrence MD  5/11/2020 7:51 PM CDT  Workstation: [employer]          I have reviewed the patient's current medications.     Assessment/Plan     Hospital Problem List:  Principal Problem:    Diabetic ketoacidosis without coma associated with diabetes mellitus due to underlying condition (CMS/[facility])  Active Problems:    DARRELL (acute kidney injury) (CMS/[facility])    Metabolic acidosis    HLD (hyperlipidemia)    Tobacco abuse    Hyponatremia    Dehydration    Acute metabolic encephalopathy    Anxiety    Gastroesophageal reflux disease    Hypothermia    Leukocytosis    Sepsis (CMS/HCC)    UTI (urinary tract infection)    Diabetic ketoacidosis (complicated by acute metabolic encephalopathy): Improving.  Continue treatment protocol for DKA.  Pseudohyponatremia is reactive and much improved.    Acute kidney injury: This is likely prerenal.  Last creatinine on record was 1.4 on 2/5/2018.  Creatinine is down to 1.86 from a high of 2.35.  Continue IV hydration, avoid  nephrotoxins, monitor renal function and consult nephrologist if the need arises.    Hypokalemia: Continue potassium repletion protocol.    Hypophosphatemia: Continue phosphorus repletion protocol.    Sepsis secondary to acute cystitis: Patient did screen positive for sepsis.  Continue empiric antimicrobial therapy pending outcome of blood and urine cultures.  Reactive leukocytosis has resolved.    Nicotine dependence: Begin nicotine patch.    Continue GI and DVT prophylaxis.      Discharge Planning: In progress.    Misha Hart MD   05/12/20   09:41

## 2020-05-12 NOTE — ED NOTES
Pt had device on left upper arm that she stated checked her BS and gave insulin. Device removed and placed in pt belongings bag. Pt states she is supposed to replace it every 14 days, and it hadn't been replaced in longer than that anyway. Pt had it wrapped with coban.      Lou Partida, RN  05/11/20 8165

## 2020-05-12 NOTE — CONSULTS
Adult Nutrition  Assessment    Patient Name:  Ingrid Ortiz  YOB: 1970  MRN: [id number]  Admit Date:  5/11/2020    Assessment Date:  5/12/2020    Comments:  Pt admitted with blood sugar of 1124 and noted to be in DKA.  She is currently NPO on an insulin drip.  Pt is wanting to eat and seems frustrated that we won't let her eat.  Nsg has attempted multiple times to explain to pt that she cannot eat at this time.  Bun and Creat elevated with DARRELL.  Also noted to be septic with metabolic Acidosis.  Her Hgb A1c was 11.5 on admit indicating uncontrolled DM.  Will attempt education prior to discharge.  Will monitor     Reason for Assessment     Row Name 05/12/20 1153          Reason for Assessment    Reason For Assessment  per organizational policy     Diagnosis  diabetes diagnosis/complications     Identified At Risk by Screening Criteria  need for education;other (see comments) Uncontrolled blood sugars         Nutrition/Diet History     Row Name 05/12/20 1154          Nutrition/Diet History    Typical Food/Fluid Intake  Pt awake but tearful.  She was wanting something to drink and eat despite multiple attempts by staff to explain that she could not eat at this time.  She did say that she was not allergic to any foods.  Then started asking for something to drink.           Anthropometrics     Row Name 05/12/20 0600          Anthropometrics    Weight  71.4 kg (157 lb 6.5 oz)         Labs/Tests/Procedures/Meds     Row Name 05/12/20 1155          Labs/Procedures/Meds    Lab Results Reviewed  reviewed, pertinent     Lab Results Comments  Glucose 230; bun 45; Creat 1.71; HgbA1c 11.50; Phos 1.2; Glucose 1124 on admit        Diagnostic Tests/Procedures    Diagnostic Test/Procedure Reviewed  reviewed, pertinent     Diagnostic Test/Procedures Comments  Insulin drip        Medications    Pertinent Medications Reviewed  reviewed, pertinent     Pertinent Medications Comments  Insulin drip; IVF          Physical Findings     Row Name 05/12/20 1157          Physical Findings    Overall Physical Appearance  on oxygen therapy         Estimated/Assessed Needs     Row Name 05/12/20 1157          Calculation Measurements    Weight Used For Calculations  59 kg (130 lb) IBW        Estimated/Assessed Needs    Additional Documentation  Additional Requirements (Group);Fluid Requirements (Group);Cassia-St. Jeor Equation (Group);Protein Requirements (Group);Calorie Requirements (Group);Fluid Deficit (Group);KCAL/KG (Group)        Calorie Requirements    Estimated Calorie Requirement (kcal/day)  1500     Estimated Calorie Need Method  kcal/kg        KCAL/KG    KCAL/KG  25 Kcal/Kg (kcal)     25 Kcal/Kg (kcal)  1474.2        Cassia-St. Jeor Equation    RMR (Cassia-St. Jeor Equation)  1226.43        Protein Requirements    Weight Used For Protein Calculations  59 kg (130 lb)     Est Protein Requirement Amount (gms/kg)  1.2 gm protein 59--71 gms (1.0-1.2 gms/lg IBW)     Estimated Protein Requirements (gms/day)  70.76         Nutrition Prescription Ordered     Row Name 05/12/20 1158          Nutrition Prescription PO    Current PO Diet  NPO                 Electronically signed by:  Lalitha Plasencia RD  05/12/20 12:03

## 2020-05-12 NOTE — ED NOTES
Pt wearing mask upon arrival - pt educating on mask use several times. Pt taking mask off, putting it in the bed or wearing mask below her nose.      Lou Partida RN  05/11/20 8142

## 2020-05-12 NOTE — PLAN OF CARE
Problem: Patient Care Overview  Goal: Plan of Care Review  5/12/2020 1546 by Eli Amin RN  Outcome: Outcome(s) achieved  Flowsheets (Taken 5/12/2020 1546)  Progress: improving  Plan of Care Reviewed With: patient  Outcome Summary: Patient stable, blood glucose levels trending down, will continue to monitor

## 2020-05-12 NOTE — ED NOTES
"FSBS: reads \"HI\"     Lou Partida, PRANEETH  05/11/20 2119       Lou Partida, RN  05/11/20 2119    "

## 2020-05-13 LAB
ANION GAP SERPL CALCULATED.3IONS-SCNC: 13 MMOL/L (ref 5–15)
ANION GAP SERPL CALCULATED.3IONS-SCNC: 16 MMOL/L (ref 5–15)
BUN BLD-MCNC: 31 MG/DL (ref 6–20)
BUN BLD-MCNC: 36 MG/DL (ref 6–20)
BUN/CREAT SERPL: 18.1 (ref 7–25)
BUN/CREAT SERPL: 21.1 (ref 7–25)
CALCIUM SPEC-SCNC: 7.6 MG/DL (ref 8.6–10.5)
CALCIUM SPEC-SCNC: 7.9 MG/DL (ref 8.6–10.5)
CHLORIDE SERPL-SCNC: 113 MMOL/L (ref 98–107)
CHLORIDE SERPL-SCNC: 115 MMOL/L (ref 98–107)
CO2 SERPL-SCNC: 12 MMOL/L (ref 22–29)
CO2 SERPL-SCNC: 9 MMOL/L (ref 22–29)
CREAT BLD-MCNC: 1.71 MG/DL (ref 0.57–1)
CREAT BLD-MCNC: 1.71 MG/DL (ref 0.57–1)
GFR SERPL CREATININE-BSD FRML MDRD: 32 ML/MIN/1.73
GFR SERPL CREATININE-BSD FRML MDRD: 32 ML/MIN/1.73
GLUCOSE BLD-MCNC: 134 MG/DL (ref 65–99)
GLUCOSE BLD-MCNC: 207 MG/DL (ref 65–99)
GLUCOSE BLDC GLUCOMTR-MCNC: 103 MG/DL (ref 70–130)
GLUCOSE BLDC GLUCOMTR-MCNC: 112 MG/DL (ref 70–130)
GLUCOSE BLDC GLUCOMTR-MCNC: 112 MG/DL (ref 70–130)
GLUCOSE BLDC GLUCOMTR-MCNC: 117 MG/DL (ref 70–130)
GLUCOSE BLDC GLUCOMTR-MCNC: 123 MG/DL (ref 70–130)
GLUCOSE BLDC GLUCOMTR-MCNC: 161 MG/DL (ref 70–130)
GLUCOSE BLDC GLUCOMTR-MCNC: 172 MG/DL (ref 70–130)
GLUCOSE BLDC GLUCOMTR-MCNC: 180 MG/DL (ref 70–130)
GLUCOSE BLDC GLUCOMTR-MCNC: 183 MG/DL (ref 70–130)
GLUCOSE BLDC GLUCOMTR-MCNC: 222 MG/DL (ref 70–130)
GLUCOSE BLDC GLUCOMTR-MCNC: 225 MG/DL (ref 70–130)
GLUCOSE BLDC GLUCOMTR-MCNC: 255 MG/DL (ref 70–130)
GLUCOSE BLDC GLUCOMTR-MCNC: 97 MG/DL (ref 70–130)
MAGNESIUM SERPL-MCNC: 1.5 MG/DL (ref 1.6–2.6)
MAGNESIUM SERPL-MCNC: 1.6 MG/DL (ref 1.6–2.6)
MAGNESIUM SERPL-MCNC: 1.7 MG/DL (ref 1.6–2.6)
PHOSPHATE SERPL-MCNC: 1.7 MG/DL (ref 2.5–4.5)
PHOSPHATE SERPL-MCNC: 1.9 MG/DL (ref 2.5–4.5)
PHOSPHATE SERPL-MCNC: 2.3 MG/DL (ref 2.5–4.5)
PHOSPHATE SERPL-MCNC: 2.6 MG/DL (ref 2.5–4.5)
POTASSIUM BLD-SCNC: 4.3 MMOL/L (ref 3.5–5.2)
POTASSIUM BLD-SCNC: 4.4 MMOL/L (ref 3.5–5.2)
POTASSIUM BLD-SCNC: 5.3 MMOL/L (ref 3.5–5.2)
SODIUM BLD-SCNC: 138 MMOL/L (ref 136–145)
SODIUM BLD-SCNC: 140 MMOL/L (ref 136–145)

## 2020-05-13 PROCEDURE — 63710000001 INSULIN DETEMIR PER 5 UNITS: Performed by: INTERNAL MEDICINE

## 2020-05-13 PROCEDURE — 25010000002 ENOXAPARIN PER 10 MG: Performed by: HOSPITALIST

## 2020-05-13 PROCEDURE — 25010000002 CEFTRIAXONE PER 250 MG: Performed by: INTERNAL MEDICINE

## 2020-05-13 PROCEDURE — 25010000002 ONDANSETRON PER 1 MG: Performed by: HOSPITALIST

## 2020-05-13 PROCEDURE — 25010000002 HYDROMORPHONE 1 MG/ML SOLUTION: Performed by: HOSPITALIST

## 2020-05-13 PROCEDURE — 84132 ASSAY OF SERUM POTASSIUM: CPT | Performed by: INTERNAL MEDICINE

## 2020-05-13 PROCEDURE — 84100 ASSAY OF PHOSPHORUS: CPT | Performed by: INTERNAL MEDICINE

## 2020-05-13 PROCEDURE — 63710000001 INSULIN ASPART PER 5 UNITS: Performed by: INTERNAL MEDICINE

## 2020-05-13 PROCEDURE — 80048 BASIC METABOLIC PNL TOTAL CA: CPT | Performed by: HOSPITALIST

## 2020-05-13 PROCEDURE — 25010000002 HALOPERIDOL LACTATE PER 5 MG: Performed by: INTERNAL MEDICINE

## 2020-05-13 PROCEDURE — 94640 AIRWAY INHALATION TREATMENT: CPT

## 2020-05-13 PROCEDURE — 84100 ASSAY OF PHOSPHORUS: CPT | Performed by: HOSPITALIST

## 2020-05-13 PROCEDURE — 83735 ASSAY OF MAGNESIUM: CPT | Performed by: HOSPITALIST

## 2020-05-13 PROCEDURE — 82962 GLUCOSE BLOOD TEST: CPT

## 2020-05-13 PROCEDURE — 94799 UNLISTED PULMONARY SVC/PX: CPT

## 2020-05-13 PROCEDURE — 25010000002 LORAZEPAM PER 2 MG: Performed by: INTERNAL MEDICINE

## 2020-05-13 RX ORDER — PRAMIPEXOLE DIHYDROCHLORIDE 1 MG/1
1 TABLET ORAL DAILY
Status: DISCONTINUED | OUTPATIENT
Start: 2020-05-13 | End: 2020-05-16

## 2020-05-13 RX ORDER — HALOPERIDOL 5 MG/ML
2 INJECTION INTRAMUSCULAR EVERY 6 HOURS PRN
Status: COMPLETED | OUTPATIENT
Start: 2020-05-13 | End: 2020-05-14

## 2020-05-13 RX ORDER — SODIUM CHLORIDE 9 MG/ML
INJECTION, SOLUTION INTRAVENOUS
Status: DISPENSED
Start: 2020-05-13 | End: 2020-05-13

## 2020-05-13 RX ORDER — VENLAFAXINE HYDROCHLORIDE 75 MG/1
75 CAPSULE, EXTENDED RELEASE ORAL DAILY
Status: DISCONTINUED | OUTPATIENT
Start: 2020-05-14 | End: 2020-05-18 | Stop reason: HOSPADM

## 2020-05-13 RX ORDER — HALOPERIDOL 5 MG/ML
INJECTION INTRAMUSCULAR
Status: DISPENSED
Start: 2020-05-13 | End: 2020-05-14

## 2020-05-13 RX ORDER — IPRATROPIUM BROMIDE AND ALBUTEROL SULFATE 2.5; .5 MG/3ML; MG/3ML
3 SOLUTION RESPIRATORY (INHALATION)
Status: DISCONTINUED | OUTPATIENT
Start: 2020-05-13 | End: 2020-05-14

## 2020-05-13 RX ORDER — NICOTINE POLACRILEX 4 MG
15 LOZENGE BUCCAL
Status: DISCONTINUED | OUTPATIENT
Start: 2020-05-13 | End: 2020-05-17

## 2020-05-13 RX ORDER — DEXTROSE MONOHYDRATE 25 G/50ML
25 INJECTION, SOLUTION INTRAVENOUS
Status: DISCONTINUED | OUTPATIENT
Start: 2020-05-13 | End: 2020-05-17

## 2020-05-13 RX ADMIN — FAMOTIDINE 20 MG: 10 INJECTION INTRAVENOUS at 09:06

## 2020-05-13 RX ADMIN — FAMOTIDINE 20 MG: 10 INJECTION INTRAVENOUS at 07:44

## 2020-05-13 RX ADMIN — INSULIN ASPART 3 UNITS: 100 INJECTION, SOLUTION INTRAVENOUS; SUBCUTANEOUS at 17:12

## 2020-05-13 RX ADMIN — SODIUM CHLORIDE, PRESERVATIVE FREE 10 ML: 5 INJECTION INTRAVENOUS at 09:07

## 2020-05-13 RX ADMIN — LORAZEPAM 1 MG: 2 INJECTION INTRAMUSCULAR; INTRAVENOUS at 10:00

## 2020-05-13 RX ADMIN — SODIUM PHOSPHATE, MONOBASIC, MONOHYDRATE 15 MMOL: 276; 142 INJECTION, SOLUTION INTRAVENOUS at 05:48

## 2020-05-13 RX ADMIN — HYDROMORPHONE HYDROCHLORIDE 0.5 MG: 1 INJECTION, SOLUTION INTRAMUSCULAR; INTRAVENOUS; SUBCUTANEOUS at 01:40

## 2020-05-13 RX ADMIN — POTASSIUM CHLORIDE, DEXTROSE MONOHYDRATE AND SODIUM CHLORIDE 150 ML/HR: 150; 5; 450 INJECTION, SOLUTION INTRAVENOUS at 00:24

## 2020-05-13 RX ADMIN — HYDROMORPHONE HYDROCHLORIDE 0.5 MG: 1 INJECTION, SOLUTION INTRAMUSCULAR; INTRAVENOUS; SUBCUTANEOUS at 13:23

## 2020-05-13 RX ADMIN — HYDROMORPHONE HYDROCHLORIDE 0.5 MG: 1 INJECTION, SOLUTION INTRAMUSCULAR; INTRAVENOUS; SUBCUTANEOUS at 15:24

## 2020-05-13 RX ADMIN — SODIUM CHLORIDE, PRESERVATIVE FREE 3 ML: 5 INJECTION INTRAVENOUS at 09:07

## 2020-05-13 RX ADMIN — HYDROMORPHONE HYDROCHLORIDE 0.5 MG: 1 INJECTION, SOLUTION INTRAMUSCULAR; INTRAVENOUS; SUBCUTANEOUS at 18:16

## 2020-05-13 RX ADMIN — INSULIN ASPART 2 UNITS: 100 INJECTION, SOLUTION INTRAVENOUS; SUBCUTANEOUS at 11:01

## 2020-05-13 RX ADMIN — SODIUM CHLORIDE, PRESERVATIVE FREE 10 ML: 5 INJECTION INTRAVENOUS at 20:04

## 2020-05-13 RX ADMIN — HYDROMORPHONE HYDROCHLORIDE 0.5 MG: 1 INJECTION, SOLUTION INTRAMUSCULAR; INTRAVENOUS; SUBCUTANEOUS at 22:57

## 2020-05-13 RX ADMIN — HYDROMORPHONE HYDROCHLORIDE 0.5 MG: 1 INJECTION, SOLUTION INTRAMUSCULAR; INTRAVENOUS; SUBCUTANEOUS at 20:43

## 2020-05-13 RX ADMIN — INSULIN DETEMIR 10 UNITS: 100 INJECTION, SOLUTION SUBCUTANEOUS at 11:31

## 2020-05-13 RX ADMIN — HALOPERIDOL LACTATE 2 MG: 5 INJECTION, SOLUTION INTRAMUSCULAR at 22:58

## 2020-05-13 RX ADMIN — CEFTRIAXONE SODIUM 1 G: 1 INJECTION, POWDER, FOR SOLUTION INTRAMUSCULAR; INTRAVENOUS at 10:49

## 2020-05-13 RX ADMIN — ENOXAPARIN SODIUM 30 MG: 30 INJECTION SUBCUTANEOUS at 22:19

## 2020-05-13 RX ADMIN — IPRATROPIUM BROMIDE AND ALBUTEROL SULFATE 3 ML: 2.5; .5 SOLUTION RESPIRATORY (INHALATION) at 15:37

## 2020-05-13 RX ADMIN — POTASSIUM CHLORIDE, DEXTROSE MONOHYDRATE AND SODIUM CHLORIDE 150 ML/HR: 150; 5; 450 INJECTION, SOLUTION INTRAVENOUS at 07:44

## 2020-05-13 RX ADMIN — SODIUM BICARBONATE 75 MEQ: 84 INJECTION, SOLUTION INTRAVENOUS at 10:49

## 2020-05-13 RX ADMIN — SODIUM BICARBONATE 75 MEQ: 84 INJECTION, SOLUTION INTRAVENOUS at 20:04

## 2020-05-13 RX ADMIN — PRAMIPEXOLE DIHYDROCHLORIDE 1 MG: 1 TABLET ORAL at 10:49

## 2020-05-13 RX ADMIN — NICOTINE 1 PATCH: 21 PATCH, EXTENDED RELEASE TRANSDERMAL at 09:06

## 2020-05-13 RX ADMIN — HYDROMORPHONE HYDROCHLORIDE 0.5 MG: 1 INJECTION, SOLUTION INTRAMUSCULAR; INTRAVENOUS; SUBCUTANEOUS at 07:14

## 2020-05-13 RX ADMIN — IPRATROPIUM BROMIDE AND ALBUTEROL SULFATE 3 ML: 2.5; .5 SOLUTION RESPIRATORY (INHALATION) at 12:06

## 2020-05-13 RX ADMIN — INSULIN DETEMIR 15 UNITS: 100 INJECTION, SOLUTION SUBCUTANEOUS at 20:20

## 2020-05-13 RX ADMIN — HYDROMORPHONE HYDROCHLORIDE 0.5 MG: 1 INJECTION, SOLUTION INTRAMUSCULAR; INTRAVENOUS; SUBCUTANEOUS at 11:20

## 2020-05-13 RX ADMIN — SODIUM CHLORIDE, PRESERVATIVE FREE 3 ML: 5 INJECTION INTRAVENOUS at 20:04

## 2020-05-13 RX ADMIN — LORAZEPAM 1 MG: 2 INJECTION INTRAMUSCULAR; INTRAVENOUS at 05:23

## 2020-05-13 RX ADMIN — HALOPERIDOL LACTATE 2 MG: 5 INJECTION, SOLUTION INTRAMUSCULAR at 17:25

## 2020-05-13 RX ADMIN — ONDANSETRON 4 MG: 2 INJECTION INTRAMUSCULAR; INTRAVENOUS at 01:40

## 2020-05-13 NOTE — PROGRESS NOTES
[facility] Medicine Services  INPATIENT PROGRESS NOTE    Length of Stay: 2  Date of Admission: 5/11/2020  Primary Care Physician: Provider, No Known    Subjective   Chief Complaint: No new changes.    HPI:    The patient is a 49-year-old white female with history of diabetes mellitus, GERD, hyperlipidemia, anxiety, tobacco abuse and noncompliance who was admitted with history of feeling extremely weak and with some confusion.  The patient was evaluated in the ER noted to have very elevated blood sugar of over thousand with diabetic ketoacidosis.  She appeared very dehydrated and her creatinine was elevated at 2.  White count was elevated at 25,000 and she was hypothermic on admission requiring Jarrod hugger to warm her up.  The patient was also hyponatremic with sodium of 114.  The patient was given some fluid boluses and some insulin and cultures were taken and she was started on broad-spectrum antibiotics in the ER.  Urinalysis showed changes suggesting possible UTI.    Patient is seen for follow-up today.  She remains confused, on insulin infusion but otherwise hemodynamically stable.    Review of Systems  Unable to review due to acuity of illness.  Objective    Heart Rate:  [] 109  Resp:  [16-22] 20  BP: (112-150)/(55-72) 135/72    Physical Exam   Constitutional: She appears well-developed and well-nourished. She is cooperative. No distress.   HENT:   Head: Normocephalic and atraumatic.   Right Ear: External ear normal.   Left Ear: External ear normal.   Nose: Nose normal.   Mouth/Throat: Oropharynx is clear and moist.   Eyes: Conjunctivae are normal.   Neck: Neck supple. No JVD present. No thyromegaly present.   Cardiovascular: Normal rate, regular rhythm, normal heart sounds and intact distal pulses. Exam reveals no gallop and no friction rub.   No murmur heard.  Pulmonary/Chest: Effort normal and breath sounds normal. No stridor. No respiratory distress. She has  no wheezes. She has no rales. She exhibits no tenderness.   Abdominal: Soft. Bowel sounds are normal. She exhibits no distension and no mass. There is no tenderness. There is no rebound and no guarding. No hernia.   Musculoskeletal: She exhibits no edema, tenderness or deformity.   Neurological: She has normal reflexes. She exhibits normal muscle tone.   She is pleasantly confused.     Skin: Skin is warm and dry. No rash noted. She is not diaphoretic. No erythema. No pallor.   Nursing note and vitals reviewed.        Medication Review:    Current Facility-Administered Medications:   •  acetaminophen (TYLENOL) tablet 650 mg, 650 mg, Oral, Q4H PRN **OR** [DISCONTINUED] acetaminophen (TYLENOL) 160 MG/5ML solution 650 mg, 650 mg, Oral, Q4H PRN **OR** acetaminophen (TYLENOL) suppository 650 mg, 650 mg, Rectal, Q4H PRN, Noel Zamudio MD  •  cefTRIAXone (ROCEPHIN) 1 g/100 mL 0.9% NS (MBP), 1 g, Intravenous, Q24H, Misha Hart MD, 1 g at 05/12/20 1026  •  dextrose (D50W) 25 g/ 50mL Intravenous Solution 12.5 g, 12.5 g, Intravenous, PRN, Noel Zamudio MD  •  dextrose 5 % and sodium chloride 0.45 % infusion, 150 mL/hr, Intravenous, Continuous PRNRobb Lyle E, MD  •  dextrose 5 % and sodium chloride 0.45 % with KCl 20 mEq/L infusion, 150 mL/hr, Intravenous, Continuous PRNRobb Lyle E, MD, Last Rate: 150 mL/hr at 05/13/20 0744, 150 mL/hr at 05/13/20 0744  •  dextrose 5 % and sodium chloride 0.45 % with KCl 40 mEq/L infusion, 150 mL/hr, Intravenous, Continuous PRNRobb Lyle E, MD  •  dextrose 5 % and sodium chloride 0.9 % infusion, 150 mL/hr, Intravenous, Continuous PRNRobb Lyle E, MD  •  dextrose 5 % and sodium chloride 0.9 % with KCl 20 mEq/L infusion, 150 mL/hr, Intravenous, Continuous PRNRobb Lyle E, MD  •  dextrose 5 % and sodium chloride 0.9 % with KCl 40 mEq/L infusion, 150 mL/hr, Intravenous, Continuous PRN, Noel Zamudio MD  •  enoxaparin (LOVENOX) syringe 30 mg, 30 mg, Subcutaneous, Q24H,  Noel Zamudio MD, 30 mg at 05/12/20 2359  •  famotidine (PEPCID) injection 20 mg, 20 mg, Intravenous, Daily, Noel Zamudio MD, 20 mg at 05/13/20 0906  •  HYDROmorphone (DILAUDID) injection 0.5 mg, 0.5 mg, Intravenous, Q2H PRN, Noel Zamudio MD, 0.5 mg at 05/13/20 0714  •  insulin regular (HumuLIN R,NovoLIN R) 100 Units in sodium chloride 0.9 % 100 mL (1 Units/mL) infusion, 6 Units/hr, Intravenous, Titrated, Noel Zamudio MD, Last Rate: 2 mL/hr at 05/13/20 0953, 2 Units/hr at 05/13/20 0953  •  LORazepam (ATIVAN) injection 1 mg, 1 mg, Intravenous, Q4H PRN, Misha Hart MD, 1 mg at 05/13/20 0523  •  nicotine (NICODERM CQ) 21 MG/24HR patch 1 patch, 1 patch, Transdermal, Q24H, Misha Hart MD, 1 patch at 05/13/20 0906  •  ondansetron (ZOFRAN) tablet 4 mg, 4 mg, Oral, Q6H PRN **OR** ondansetron (ZOFRAN) injection 4 mg, 4 mg, Intravenous, Q6H PRN, Noel Zamudio MD, 4 mg at 05/13/20 0140  •  Pharmacy to Dose Zosyn, , Does not apply, Continuous PRN, Noel Zamudio MD  •  potassium phosphate 45 mmol in sodium chloride 0.9 % 250 mL infusion, 45 mmol, Intravenous, PRN **OR** potassium phosphate 30 mmol in sodium chloride 0.9 % 100 mL infusion, 30 mmol, Intravenous, PRN, Last Rate: 25 mL/hr at 05/12/20 1025, 30 mmol at 05/12/20 1025 **OR** potassium phosphate 15 mmol in sodium chloride 0.9 % 100 mL infusion, 15 mmol, Intravenous, PRN **OR** sodium phosphates 45 mmol in sodium chloride 0.9 % 250 mL IVPB, 45 mmol, Intravenous, PRN **OR** sodium phosphates 30 mmol in sodium chloride 0.9 % 100 mL IVPB, 30 mmol, Intravenous, PRN **OR** sodium phosphates 15 mmol in sodium chloride 0.9 % 100 mL IVPB, 15 mmol, Intravenous, PRN, Misha Hart MD, Last Rate: 50 mL/hr at 05/13/20 0548, 15 mmol at 05/13/20 0548  •  sodium chloride 0.45 % 1,000 mL with potassium chloride 40 mEq infusion, 250 mL/hr, Intravenous, Continuous PRN, Noel Zamudio MD  •  sodium chloride 0.45 % infusion, 250 mL/hr, Intravenous, Continuous  Robb MARION Lyle E, MD  •  sodium chloride 0.45 % with KCl 20 mEq/L infusion, 250 mL/hr, Intravenous, Continuous ANGÉLICANRobb Lyle E, MD  •  [COMPLETED] Insert peripheral IV, , , Once **AND** sodium chloride 0.9 % flush 10 mL, 10 mL, Intravenous, PRN, Heriberto Springer PA-C  •  sodium chloride 0.9 % flush 10 mL, 10 mL, Intravenous, PRN, Noel Zamudio MD  •  sodium chloride 0.9 % flush 10 mL, 10 mL, Intravenous, Once PRN, Noel Zamudio MD  •  sodium chloride 0.9 % flush 10 mL, 10 mL, Intravenous, Q12H, Noel Zamudio MD, 10 mL at 05/13/20 0907  •  sodium chloride 0.9 % flush 10 mL, 10 mL, Intravenous, ANGÉLICANRobb Lyle E, MD  •  sodium chloride 0.9 % flush 3 mL, 3 mL, Intravenous, Q12H, Noel Zamudio MD, 3 mL at 05/13/20 0907  •  sodium chloride 0.9 % flush 30 mL, 30 mL, Intravenous, Once PRN, Heriberto Springer PA-C  •  sodium chloride 0.9 % infusion, 30 mL/hr, Intravenous, Continuous Gian MARION Jason A, PA-C  •  sodium chloride 0.9 % infusion, 250 mL/hr, Intravenous, Continuous Robb MARION Lyle E, MD  •  sodium chloride 0.9 % infusion, 10 mL/hr, Intravenous, Continuous Robb MARION Lyle E, MD  •  sodium chloride 0.9 % infusion, 250 mL/hr, Intravenous, Continuous, Noel Zamudio MD, Stopped at 05/12/20 0415  •  sodium chloride 0.9 % with KCl 20 mEq/L infusion, 250 mL/hr, Intravenous, Continuous Robb MARION Lyle E, MD  •  sodium chloride 0.9 % with KCl 40 mEq/L infusion, 250 mL/hr, Intravenous, Continuous Robb MARION Lyle E, MD, Last Rate: 250 mL/hr at 05/12/20 0808, 250 mL/hr at 05/12/20 0808    Results Review:  I have reviewed the labs, radiology results, and diagnostic studies.    Laboratory Data:   Results from last 7 days   Lab Units 05/13/20  0558 05/13/20  0310 05/12/20 2239  05/12/20  0610  05/11/20  2238 05/11/20  1803   SODIUM mmol/L 140 138 142   < > 134*   < > 112* 114*   POTASSIUM mmol/L 4.3 5.3* 5.3*   < > 3.4*   < > 5.2 6.4*   CHLORIDE mmol/L 115* 113* 115*   < > 104   < > 77* 76*   CO2  mmol/L 12.0* 9.0* 12.0*   < > 13.0*   < > 3.0* 3.0*   BUN mg/dL 31* 36* 38*   < > 49*   < > 57* 49*   CREATININE mg/dL 1.71* 1.71* 1.63*   < > 1.86*   < > 2.35* 2.03*   GLUCOSE mg/dL 134* 207* 252*   < > 426*   < > 1,192* 1,124*   CALCIUM mg/dL 7.9* 7.6* 7.5*   < > 7.4*   < > 8.3* 8.6   BILIRUBIN mg/dL  --   --   --   --  0.2  --  0.3 0.3   ALK PHOS U/L  --   --   --   --  227*  --  280* 268*   ALT (SGPT) U/L  --   --   --   --  41*  --  20 12   AST (SGOT) U/L  --   --   --   --  97*  --  43* 20   ANION GAP mmol/L 13.0 16.0* 15.0   < > 17.0*   < > 32.0* 35.0*    < > = values in this interval not displayed.     Estimated Creatinine Clearance: 43.9 mL/min (A) (by C-G formula based on SCr of 1.71 mg/dL (H)).  Results from last 7 days   Lab Units 05/13/20  0558 05/13/20  0310 05/12/20  2239   MAGNESIUM mg/dL 1.5* 1.7 1.6   PHOSPHORUS mg/dL 1.7* 2.3* 2.5         Results from last 7 days   Lab Units 05/12/20  0610 05/11/20  2238 05/11/20  1803   WBC 10*3/mm3 5.05 19.62* 25.62*   HEMOGLOBIN g/dL 9.7* 10.8* 10.9*   HEMATOCRIT % 28.3* 37.4 38.9   PLATELETS 10*3/mm3 194 288 336           Culture Data:   Blood Culture   Date Value Ref Range Status   05/11/2020 Escherichia coli (C)  Preliminary   05/11/2020 Escherichia coli (C)  Preliminary     No results found for: URINECX  No results found for: RESPCX  No results found for: WOUNDCX  No results found for: STOOLCX  No components found for: BODYFLD    Radiology Data:   Imaging Results (Last 24 Hours)     ** No results found for the last 24 hours. **          I have reviewed the patient's current medications.     Assessment/Plan     Hospital Problem List:  Principal Problem:    Diabetic ketoacidosis without coma associated with diabetes mellitus due to underlying condition (CMS/HCC)  Active Problems:    DARRELL (acute kidney injury) (CMS/HCC)    Metabolic acidosis    HLD (hyperlipidemia)    Tobacco abuse    Hyponatremia    Dehydration    Acute metabolic encephalopathy    Anxiety     Gastroesophageal reflux disease    Hypothermia    Leukocytosis    Sepsis (CMS/HCC)    UTI (urinary tract infection)    Diabetic ketoacidosis (complicated by acute metabolic encephalopathy): DKA has essentially resolved.   Begin basal insulin with Accu-Cheks and sliding scale insulin.  Pseudohyponatremia is reactive and has resolved.      Acute kidney injury (with metabolic acidosis): This is likely prerenal.  Last creatinine on record was 1.4 on 2/5/2018.  Creatinine is down to 1.71 from a high of 2.35.  Continue IV hydration, add sodium bicarbonate, avoid nephrotoxins, monitor renal function and consult nephrologist if the need arises.    Hypokalemia: Resolved.      Hypomagnesemia: Continue magnesium repletion protocol.    Hypophosphatemia: Continue phosphorus repletion protocol.    Sepsis secondary to acute cystitis: Patient did screen positive for sepsis.  Continue antimicrobial therapy.  Blood culture is positive for E. coli and urine culture is pending.    Reactive leukocytosis has resolved.    Nutrition: Patient is to remain n.p.o. for now pending improvement in her level of consciousness.    Nicotine dependence: Continue nicotine patch.    Continue GI and DVT prophylaxis.      Discharge Planning: In progress.    Misha Hart MD   05/13/20   09:55

## 2020-05-13 NOTE — PLAN OF CARE
Patient has been anxious and confused at times wanting to go home and asking for her momma. Called [facility] and got ativan to help with the anxiety. She has been quiet most of the night but this morning is more adament about going home , getting out of be and not alert and oriented to where she is or why she is here. DKA protocol has been going well and now on 2 units. Patients mental state continues to decline.

## 2020-05-14 ENCOUNTER — APPOINTMENT (OUTPATIENT)
Dept: INTERVENTIONAL RADIOLOGY/VASCULAR | Facility: HOSPITAL | Age: 50
End: 2020-05-14

## 2020-05-14 ENCOUNTER — APPOINTMENT (OUTPATIENT)
Dept: ULTRASOUND IMAGING | Facility: HOSPITAL | Age: 50
End: 2020-05-14

## 2020-05-14 LAB
ANION GAP SERPL CALCULATED.3IONS-SCNC: 13 MMOL/L (ref 5–15)
BACTERIA SPEC AEROBE CULT: ABNORMAL
BASOPHILS # BLD AUTO: 0 10*3/MM3 (ref 0–0.2)
BASOPHILS NFR BLD AUTO: 0 % (ref 0–1.5)
BUN BLD-MCNC: 20 MG/DL (ref 6–20)
BUN/CREAT SERPL: 12.7 (ref 7–25)
CALCIUM SPEC-SCNC: 8.3 MG/DL (ref 8.6–10.5)
CHLORIDE SERPL-SCNC: 116 MMOL/L (ref 98–107)
CO2 SERPL-SCNC: 16 MMOL/L (ref 22–29)
CREAT BLD-MCNC: 1.57 MG/DL (ref 0.57–1)
DEPRECATED RDW RBC AUTO: 46.4 FL (ref 37–54)
EOSINOPHIL # BLD AUTO: 0.04 10*3/MM3 (ref 0–0.4)
EOSINOPHIL NFR BLD AUTO: 0.3 % (ref 0.3–6.2)
ERYTHROCYTE [DISTWIDTH] IN BLOOD BY AUTOMATED COUNT: 14.8 % (ref 12.3–15.4)
GFR SERPL CREATININE-BSD FRML MDRD: 35 ML/MIN/1.73
GLUCOSE BLD-MCNC: 116 MG/DL (ref 65–99)
GLUCOSE BLDC GLUCOMTR-MCNC: 125 MG/DL (ref 70–130)
GLUCOSE BLDC GLUCOMTR-MCNC: 140 MG/DL (ref 70–130)
GLUCOSE BLDC GLUCOMTR-MCNC: 184 MG/DL (ref 70–130)
GLUCOSE BLDC GLUCOMTR-MCNC: 319 MG/DL (ref 70–130)
GLUCOSE BLDC GLUCOMTR-MCNC: 36 MG/DL (ref 70–130)
GLUCOSE BLDC GLUCOMTR-MCNC: 37 MG/DL (ref 70–130)
GLUCOSE BLDC GLUCOMTR-MCNC: 42 MG/DL (ref 70–130)
GLUCOSE BLDC GLUCOMTR-MCNC: 43 MG/DL (ref 70–130)
GLUCOSE BLDC GLUCOMTR-MCNC: 72 MG/DL (ref 70–130)
GRAM STN SPEC: ABNORMAL
HCT VFR BLD AUTO: 37.7 % (ref 34–46.6)
HGB BLD-MCNC: 12.5 G/DL (ref 12–15.9)
IMM GRANULOCYTES # BLD AUTO: 0.33 10*3/MM3 (ref 0–0.05)
IMM GRANULOCYTES NFR BLD AUTO: 2.3 % (ref 0–0.5)
ISOLATED FROM: ABNORMAL
ISOLATED FROM: ABNORMAL
LYMPHOCYTES # BLD AUTO: 1.08 10*3/MM3 (ref 0.7–3.1)
LYMPHOCYTES NFR BLD AUTO: 7.6 % (ref 19.6–45.3)
MAGNESIUM SERPL-MCNC: 1.6 MG/DL (ref 1.6–2.6)
MCH RBC QN AUTO: 28.5 PG (ref 26.6–33)
MCHC RBC AUTO-ENTMCNC: 33.2 G/DL (ref 31.5–35.7)
MCV RBC AUTO: 86.1 FL (ref 79–97)
MONOCYTES # BLD AUTO: 0.62 10*3/MM3 (ref 0.1–0.9)
MONOCYTES NFR BLD AUTO: 4.3 % (ref 5–12)
NEUTROPHILS # BLD AUTO: 12.23 10*3/MM3 (ref 1.7–7)
NEUTROPHILS NFR BLD AUTO: 85.5 % (ref 42.7–76)
NRBC BLD AUTO-RTO: 0 /100 WBC (ref 0–0.2)
PHOSPHATE SERPL-MCNC: 1.7 MG/DL (ref 2.5–4.5)
PHOSPHATE SERPL-MCNC: 2.8 MG/DL (ref 2.5–4.5)
PHOSPHATE SERPL-MCNC: 3.1 MG/DL (ref 2.5–4.5)
PLATELET # BLD AUTO: 129 10*3/MM3 (ref 140–450)
PMV BLD AUTO: 12 FL (ref 6–12)
POTASSIUM BLD-SCNC: 3.8 MMOL/L (ref 3.5–5.2)
RBC # BLD AUTO: 4.38 10*6/MM3 (ref 3.77–5.28)
SODIUM BLD-SCNC: 145 MMOL/L (ref 136–145)
WBC NRBC COR # BLD: 14.3 10*3/MM3 (ref 3.4–10.8)

## 2020-05-14 PROCEDURE — 76937 US GUIDE VASCULAR ACCESS: CPT

## 2020-05-14 PROCEDURE — 82962 GLUCOSE BLOOD TEST: CPT

## 2020-05-14 PROCEDURE — 25010000002 HYDROMORPHONE 1 MG/ML SOLUTION: Performed by: HOSPITALIST

## 2020-05-14 PROCEDURE — 05HY33Z INSERTION OF INFUSION DEVICE INTO UPPER VEIN, PERCUTANEOUS APPROACH: ICD-10-PCS | Performed by: INTERNAL MEDICINE

## 2020-05-14 PROCEDURE — 83735 ASSAY OF MAGNESIUM: CPT | Performed by: INTERNAL MEDICINE

## 2020-05-14 PROCEDURE — 85025 COMPLETE CBC W/AUTO DIFF WBC: CPT | Performed by: INTERNAL MEDICINE

## 2020-05-14 PROCEDURE — 92610 EVALUATE SWALLOWING FUNCTION: CPT | Performed by: SPEECH-LANGUAGE PATHOLOGIST

## 2020-05-14 PROCEDURE — 80048 BASIC METABOLIC PNL TOTAL CA: CPT | Performed by: INTERNAL MEDICINE

## 2020-05-14 PROCEDURE — 25010000002 HALOPERIDOL LACTATE PER 5 MG: Performed by: INTERNAL MEDICINE

## 2020-05-14 PROCEDURE — 36410 VNPNXR 3YR/> PHY/QHP DX/THER: CPT

## 2020-05-14 PROCEDURE — 63710000001 INSULIN DETEMIR PER 5 UNITS: Performed by: INTERNAL MEDICINE

## 2020-05-14 PROCEDURE — C1751 CATH, INF, PER/CENT/MIDLINE: HCPCS

## 2020-05-14 PROCEDURE — 84100 ASSAY OF PHOSPHORUS: CPT | Performed by: INTERNAL MEDICINE

## 2020-05-14 PROCEDURE — 25010000002 CEFTRIAXONE PER 250 MG: Performed by: INTERNAL MEDICINE

## 2020-05-14 RX ORDER — IPRATROPIUM BROMIDE AND ALBUTEROL SULFATE 2.5; .5 MG/3ML; MG/3ML
3 SOLUTION RESPIRATORY (INHALATION) EVERY 6 HOURS PRN
Status: DISCONTINUED | OUTPATIENT
Start: 2020-05-14 | End: 2020-05-18 | Stop reason: HOSPADM

## 2020-05-14 RX ADMIN — SODIUM BICARBONATE 75 MEQ: 84 INJECTION, SOLUTION INTRAVENOUS at 06:08

## 2020-05-14 RX ADMIN — ACETAMINOPHEN 650 MG: 650 SUPPOSITORY RECTAL at 04:22

## 2020-05-14 RX ADMIN — HYDROMORPHONE HYDROCHLORIDE 0.5 MG: 1 INJECTION, SOLUTION INTRAMUSCULAR; INTRAVENOUS; SUBCUTANEOUS at 21:30

## 2020-05-14 RX ADMIN — HALOPERIDOL LACTATE 2 MG: 5 INJECTION, SOLUTION INTRAMUSCULAR at 14:21

## 2020-05-14 RX ADMIN — CEFTRIAXONE SODIUM 2 G: 2 INJECTION, POWDER, FOR SOLUTION INTRAMUSCULAR; INTRAVENOUS at 11:46

## 2020-05-14 RX ADMIN — FAMOTIDINE 20 MG: 10 INJECTION INTRAVENOUS at 09:02

## 2020-05-14 RX ADMIN — DEXTROSE MONOHYDRATE 25 G: 25 INJECTION, SOLUTION INTRAVENOUS at 02:13

## 2020-05-14 RX ADMIN — NICOTINE 1 PATCH: 21 PATCH, EXTENDED RELEASE TRANSDERMAL at 09:02

## 2020-05-14 RX ADMIN — SODIUM CHLORIDE, PRESERVATIVE FREE 3 ML: 5 INJECTION INTRAVENOUS at 09:04

## 2020-05-14 RX ADMIN — HYDROMORPHONE HYDROCHLORIDE 0.5 MG: 1 INJECTION, SOLUTION INTRAMUSCULAR; INTRAVENOUS; SUBCUTANEOUS at 19:31

## 2020-05-14 RX ADMIN — HYDROMORPHONE HYDROCHLORIDE 0.5 MG: 1 INJECTION, SOLUTION INTRAMUSCULAR; INTRAVENOUS; SUBCUTANEOUS at 06:26

## 2020-05-14 RX ADMIN — VENLAFAXINE HYDROCHLORIDE 75 MG: 75 CAPSULE, EXTENDED RELEASE ORAL at 09:03

## 2020-05-14 RX ADMIN — DEXTROSE MONOHYDRATE 25 G: 25 INJECTION, SOLUTION INTRAVENOUS at 12:09

## 2020-05-14 RX ADMIN — INSULIN DETEMIR 10 UNITS: 100 INJECTION, SOLUTION SUBCUTANEOUS at 23:53

## 2020-05-14 RX ADMIN — HALOPERIDOL LACTATE 2 MG: 5 INJECTION, SOLUTION INTRAMUSCULAR at 04:40

## 2020-05-14 RX ADMIN — PRAMIPEXOLE DIHYDROCHLORIDE 1 MG: 1 TABLET ORAL at 09:03

## 2020-05-14 RX ADMIN — POTASSIUM PHOSPHATE, MONOBASIC AND POTASSIUM PHOSPHATE, DIBASIC 30 MMOL: 224; 236 INJECTION, SOLUTION, CONCENTRATE INTRAVENOUS at 06:08

## 2020-05-14 RX ADMIN — HYDROMORPHONE HYDROCHLORIDE 0.5 MG: 1 INJECTION, SOLUTION INTRAMUSCULAR; INTRAVENOUS; SUBCUTANEOUS at 04:39

## 2020-05-14 RX ADMIN — SODIUM CHLORIDE, PRESERVATIVE FREE 10 ML: 5 INJECTION INTRAVENOUS at 21:30

## 2020-05-14 RX ADMIN — HYDROMORPHONE HYDROCHLORIDE 0.5 MG: 1 INJECTION, SOLUTION INTRAMUSCULAR; INTRAVENOUS; SUBCUTANEOUS at 01:44

## 2020-05-14 RX ADMIN — DEXTROSE MONOHYDRATE 25 G: 25 INJECTION, SOLUTION INTRAVENOUS at 03:02

## 2020-05-14 RX ADMIN — HYDROMORPHONE HYDROCHLORIDE 0.5 MG: 1 INJECTION, SOLUTION INTRAMUSCULAR; INTRAVENOUS; SUBCUTANEOUS at 14:48

## 2020-05-14 RX ADMIN — SODIUM CHLORIDE, PRESERVATIVE FREE 10 ML: 5 INJECTION INTRAVENOUS at 09:03

## 2020-05-14 NOTE — PLAN OF CARE
Problem: Patient Care Overview  Goal: Plan of Care Review  Outcome: Ongoing (interventions implemented as appropriate)  Flowsheets (Taken 5/14/2020 1803)  Progress: no change  Plan of Care Reviewed With: patient  Outcome Summary: pt still confused and restless. pt tolerating treatment. pt fsbs low this afternoon, did not cover evening due to this.

## 2020-05-14 NOTE — PLAN OF CARE
Problem: Patient Care Overview  Goal: Plan of Care Review  5/14/2020 1241 by Lalitha Plasencia RD  Outcome: Ongoing (interventions implemented as appropriate)  Flowsheets (Taken 5/14/2020 1241)  Progress: no change  Plan of Care Reviewed With: caregiver  Outcome Summary: Pt remains NPO.  Cognitive impairment prevents po from being started.  Will monitor

## 2020-05-14 NOTE — CONSULTS
Adult Nutrition  Assessment    Patient Name:  Ingrid Ortiz  YOB: 1970  MRN: [id number]  Admit Date:  5/11/2020    Assessment Date:  5/14/2020    Comments:  Pt remains NPO.  Impaired cognitive status prevents po from being started.  DKA has resolved but her overall condition is complicated by metabolic encephalopathy.  DARRELL with slightly elevated Creat--IV hydration continues.  Hypophosphatemia--replacing phosphorus. Dxed with Sepsis due to acute cystitis with blood cultures positive.  She is to remains NPO until her level of consciousness improves. Will monitor.     Reason for Assessment     Row Name 05/14/20 1234          Reason for Assessment    Reason For Assessment  follow-up protocol         Nutrition/Diet History     Row Name 05/14/20 1234          Nutrition/Diet History    Typical Food/Fluid Intake  Pt is confused.  Naked and on all fours.  Per staff she is drinking water fine but she is very confused           Labs/Tests/Procedures/Meds     Row Name 05/14/20 1235          Labs/Procedures/Meds    Lab Results Reviewed  reviewed, pertinent     Lab Results Comments  Glucose 116; Creat 1.57; Phos 1.7 (down);         Diagnostic Tests/Procedures    Diagnostic Test/Procedure Reviewed  reviewed, pertinent     Diagnostic Test/Procedures Comments  abx; glucose management        Medications    Pertinent Medications Reviewed  reviewed, pertinent     Pertinent Medications Comments  Abx; 3 units tid; LEvemir; NaBicarb 75cc         Physical Findings     Row Name 05/14/20 1236          Physical Findings    Overall Physical Appearance  on oxygen therapy           Nutrition Prescription Ordered     Row Name 05/14/20 1237          Nutrition Prescription PO    Current PO Diet  NPO                 Electronically signed by:  Lalitha Plasencia RD  05/14/20 12:42

## 2020-05-14 NOTE — THERAPY EVALUATION
Acute Care - Speech Language Pathology   Swallow Initial Evaluation [facility]     Patient Name: Ingrid Ortiz  : 1970  MRN: [id number]  Today's Date: 2020               Admit Date: 2020  Bedside swallow evaluation com pleted on patient that is confused.  Pt able to awaken for PO trials, but required frequent cues to stay awake and to be aware of PO.  Pt had no s/s of pharyneal dysphagia, but has decreased oral awareness especially with chewable solids.  Oral care provided for dried secretions once pt opened mouth.  Recommend full liquid diet when alert, fed per staff.  ST to f/u for diet advancement as appropriate.  Raquel Moran, CCC-SLP 2020 11:59    Visit Dx:     ICD-10-CM ICD-9-CM   1. Diabetic ketoacidosis without coma associated with diabetes mellitus due to underlying condition (CMS/[facility]) E08.10 249.11   2. Acute renal failure, unspecified acute renal failure type (CMS/[facility]) N17.9 584.9   3. Hypothermia, initial encounter T68.XXXA 991.6   4. Oropharyngeal dysphagia R13.12 787.22     Patient Active Problem List   Diagnosis   • Diabetic ketoacidosis without coma associated with diabetes mellitus due to underlying condition (CMS/HCC)   • DARRELL (acute kidney injury) (CMS/HCC)   • Metabolic acidosis   • HLD (hyperlipidemia)   • Tobacco abuse   • Hyponatremia   • Dehydration   • Acute metabolic encephalopathy   • Anxiety   • Chronic obstructive pulmonary disease (CMS/HCC)   • Diabetes mellitus (CMS/HCC)   • Gastroesophageal reflux disease   • Non compliance with medical treatment   • Hyponatremia   • Hypothermia   • Leukocytosis   • Sepsis (CMS/HCC)   • UTI (urinary tract infection)     Past Medical History:   Diagnosis Date   • Diabetes (CMS/HCC)    • GERD (gastroesophageal reflux disease)    • HLD (hyperlipidemia)      Past Surgical History:   Procedure Laterality Date   • CERVICAL DISC SURGERY     • CHOLECYSTECTOMY     • HYSTERECTOMY     • TUBAL ABDOMINAL LIGATION           SWALLOW EVALUATION (last 72 hours)      SLP Adult Swallow Evaluation     Row Name 05/14/20 1133                   Rehab Evaluation    Document Type  evaluation  -EC        Subjective Information  no complaints  -EC        Patient Observations  poorly cooperative;lethargic  -EC        Patient/Family Observations  no family present  -EC        Patient Effort  poor  -EC        Comment  pt very confused  -EC        Symptoms Noted During/After Treatment  none  -EC           General Information    Patient Profile Reviewed  yes  -EC        Pertinent History Of Current Problem  confused  -EC        Current Method of Nutrition  NPO  -EC           Pain Assessment    Additional Documentation  Pain Scale: Numbers Pre/Post-Treatment (Group)  -EC           Pain Scale: Numbers Pre/Post-Treatment    Pain Scale: Numbers, Pretreatment  0/10 - no pain  -EC        Pain Scale: Numbers, Post-Treatment  0/10 - no pain  -EC           Oral Motor and Function    Dentition Assessment  poor oral hygiene;missing teeth  -EC        Secretion Management  dried secretions in oral cavity  -EC        Mucosal Quality  cracked;dry;sticky  -EC        Volitional Swallow  delayed  -EC        Volitional Cough  weak;non-productive  -EC           General Eating/Swallowing Observations    Respiratory Support Currently in Use  room air  -EC        Eating/Swallowing Skills  fed by SLP  -EC        Positioning During Eating  upright 90 degree;upright in bed  -EC        Utensils Used  straw;cup;spoon  -EC        Consistencies Trialed  regular textures;pureed;thin liquids  -EC           Clinical Swallow Eval    Oral Prep Phase  impaired  -EC        Oral Transit  impaired  -EC        Oral Residue  impaired  -EC        Pharyngeal Phase  WFL  -EC           Oral Prep Concerns    Oral Prep Concerns  prolonged mastication;inefficient mastication;reduced lip opening;incomplete or weak lip closure around spoon;increased prep time  -EC        Prolonged Mastication   mechanical soft  -EC        Inefficient Mastication  mechanical soft  -EC        Reduced Lip Opening  all consistencies  -EC        Incomplete or Weak Lip Closure Around Spoon  all consistencies  -EC        Increased Prep Time  all consistencies  -EC           Oral Transit Concerns    Oral Transit Concerns  increased oral transit time  -EC        Increased Oral Transit Time  mechanical soft  -EC           Oral Residue Concerns    Oral Residue Concerns  diffuse residue throughout oral cavity  -EC        Diffuse Residue Throughout Oral Cavity  mechanical soft  -EC           Clinical Impression    SLP Swallowing Diagnosis  mild-moderate;oral dysfunction  -EC        Functional Impact  risk of dehydration;risk of malnutrition;risk of aspiration/pneumonia  -EC        Rehab Potential/Prognosis, Swallowing  good, to achieve stated therapy goals  -EC        Swallow Criteria for Skilled Therapeutic Interventions Met  demonstrates skilled criteria  -EC           Recommendations    Therapy Frequency (Swallow)  3 days per week;5 days per week  -EC        Predicted Duration Therapy Intervention (Days)  until discharge  -EC        SLP Diet Recommendation  full liquid diet  -EC        Recommended Precautions and Strategies  other (see comments) feeding assist  -EC        SLP Rec. for Method of Medication Administration  meds whole;as tolerated  -EC           Swallow Goals (SLP)    Oral Nutrition/Hydration Goal Selection (SLP)  oral nutrition/hydration, SLP goal 1  -EC           Oral Nutrition/Hydration Goal 1 (SLP)    Oral Nutrition/Hydration Goal 1, SLP  pt to tolerate recommended diet for safe and adequate nutrition/hydratiion  -EC        Time Frame (Oral Nutrition/Hydration Goal 1, SLP)  by discharge  -EC        Barriers (Oral Nutrition/Hydration Goal 1, SLP)  cognition/ oral awareness  -EC        Progress/Outcomes (Oral Nutrition/Hydration Goal 1, SLP)  goal ongoing  -EC          User Key  (r) = Recorded By, (t) = Taken By, (c)  = Cosigned By    Initials Name Effective Dates    Raquel Mojica CCC-SLP 02/11/20 -           EDUCATION  The patient has been educated in the following areas:   Cognitive Impairment Dysphagia (Swallowing Impairment) Oral Care/Hydration Modified Diet Instruction.    SLP Recommendation and Plan  SLP Swallowing Diagnosis: mild-moderate, oral dysfunction  SLP Diet Recommendation: full liquid diet  Recommended Precautions and Strategies: other (see comments)(feeding assist)  SLP Rec. for Method of Medication Administration: meds whole, as tolerated           Swallow Criteria for Skilled Therapeutic Interventions Met: demonstrates skilled criteria     Rehab Potential/Prognosis, Swallowing: good, to achieve stated therapy goals  Therapy Frequency (Swallow): 3 days per week, 5 days per week  Predicted Duration Therapy Intervention (Days): until discharge            SLP GOALS     Row Name 05/14/20 1133             Oral Nutrition/Hydration Goal 1 (SLP)    Oral Nutrition/Hydration Goal 1, SLP  pt to tolerate recommended diet for safe and adequate nutrition/hydratiion  -EC      Time Frame (Oral Nutrition/Hydration Goal 1, SLP)  by discharge  -EC      Barriers (Oral Nutrition/Hydration Goal 1, SLP)  cognition/ oral awareness  -EC      Progress/Outcomes (Oral Nutrition/Hydration Goal 1, SLP)  goal ongoing  -EC        User Key  (r) = Recorded By, (t) = Taken By, (c) = Cosigned By    Initials Name Provider Type    Raquel Mojica CCC-SLP Speech and Language Pathologist             Time Calculation:   Time Calculation- SLP     Row Name 05/14/20 1155             Time Calculation- SLP    SLP Start Time  1133  -EC      SLP Stop Time  1200  -EC      SLP Time Calculation (min)  27 min  -EC      Total Timed Code Minutes- SLP  27 minute(s)  -EC      SLP Received On  05/14/20  -EC      SLP Goal Re-Cert Due Date  05/28/20  -EC        User Key  (r) = Recorded By, (t) = Taken By, (c) = Cosigned By    Initials Name Provider  Type    EC Raquel Moran CCC-SLP Speech and Language Pathologist          Therapy Charges for Today     Code Description Service Date Service Provider Modifiers Qty    [id number]  ST EVAL ORAL PHARYNG SWALLOW 2 5/14/2020 Raquel Moran CCC-SLP GN 1               DANIELLE Richards  5/14/2020

## 2020-05-14 NOTE — PROGRESS NOTES
[facility] Medicine Services  INPATIENT PROGRESS NOTE    Length of Stay: 3  Date of Admission: 5/11/2020  Primary Care Physician: Provider, No Known    Subjective   Chief Complaint: No new changes.    HPI:    The patient is a 49-year-old white female with history of diabetes mellitus, GERD, hyperlipidemia, anxiety, tobacco abuse and noncompliance who was admitted with history of feeling extremely weak and with some confusion.  The patient was evaluated in the ER noted to have very elevated blood sugar of over thousand with diabetic ketoacidosis.  She appeared very dehydrated and her creatinine was elevated at 2.  White count was elevated at 25,000 and she was hypothermic on admission requiring Jarrod hugger to warm her up.  The patient was also hyponatremic with sodium of 114.  The patient was given some fluid boluses and some insulin and cultures were taken and she was started on broad-spectrum antibiotics in the ER.  Urinalysis showed changes suggesting possible UTI.    Patient is seen for follow-up today.  She deconditioned, less confused and able to follow simple commands.      Review of Systems  Unable to review due to acuity of illness.  Objective    Temp:  [99.7 °F (37.6 °C)-100.2 °F (37.9 °C)] 100.2 °F (37.9 °C)  Heart Rate:  [] 98  Resp:  [17-23] 20  BP: (116-170)/(58-92) 164/73    Physical Exam   Constitutional: She appears well-developed and well-nourished. She is cooperative. No distress.   HENT:   Head: Normocephalic and atraumatic.   Right Ear: External ear normal.   Left Ear: External ear normal.   Nose: Nose normal.   Mouth/Throat: Oropharynx is clear and moist.   Eyes: Conjunctivae are normal.   Neck: Neck supple. No JVD present. No thyromegaly present.   Cardiovascular: Normal rate, regular rhythm, normal heart sounds and intact distal pulses. Exam reveals no gallop and no friction rub.   No murmur heard.  Pulmonary/Chest: Effort normal and breath  sounds normal. No stridor. No respiratory distress. She has no wheezes. She has no rales. She exhibits no tenderness.   Abdominal: Soft. Bowel sounds are normal. She exhibits no distension and no mass. There is no tenderness. There is no rebound and no guarding. No hernia.   Musculoskeletal: She exhibits no edema, tenderness or deformity.   Neurological: She has normal reflexes. She exhibits normal muscle tone.   She is pleasantly confused but could follow simple commands.     Skin: Skin is warm and dry. No rash noted. She is not diaphoretic. No erythema. No pallor.   Nursing note and vitals reviewed.        Medication Review:    Current Facility-Administered Medications:   •  acetaminophen (TYLENOL) tablet 650 mg, 650 mg, Oral, Q4H PRN **OR** [DISCONTINUED] acetaminophen (TYLENOL) 160 MG/5ML solution 650 mg, 650 mg, Oral, Q4H PRN **OR** acetaminophen (TYLENOL) suppository 650 mg, 650 mg, Rectal, Q4H PRN, Noel Zamudio MD, 650 mg at 05/14/20 0422  •  cefTRIAXone (ROCEPHIN) 2 g/100 mL 0.9% NS VTB (LEONOR), 2 g, Intravenous, Q24H, Misha Hart MD  •  dextrose (D50W) 25 g/ 50mL Intravenous Solution 25 g, 25 g, Intravenous, Q15 Min PRN, Misha Hart MD, 25 g at 05/14/20 0302  •  dextrose (GLUTOSE) oral gel 15 g, 15 g, Oral, Q15 Min PRN, Misha Hart MD  •  enoxaparin (LOVENOX) syringe 30 mg, 30 mg, Subcutaneous, Q24H, Noel Zamudio MD, 30 mg at 05/13/20 2219  •  famotidine (PEPCID) injection 20 mg, 20 mg, Intravenous, Daily, Noel Zamudio MD, 20 mg at 05/14/20 0902  •  glucagon (human recombinant) (GLUCAGEN DIAGNOSTIC) injection 1 mg, 1 mg, Subcutaneous, Q15 Min PRN, Misha Hart MD  •  haloperidol lactate (HALDOL) injection 2 mg, 2 mg, Intravenous, Q6H PRN, Misha Hart MD, 2 mg at 05/14/20 0440  •  HYDROmorphone (DILAUDID) injection 0.5 mg, 0.5 mg, Intravenous, Q2H PRN, Noel Zamudio MD, 0.5 mg at 05/14/20 0626  •  insulin aspart (novoLOG) injection 0-7 Units, 0-7 Units,  Subcutaneous, TID AC, Misha Hart MD, 3 Units at 05/13/20 1712  •  insulin detemir (LEVEMIR) injection 15 Units, 15 Units, Subcutaneous, Nightly, Misha Hart MD, 15 Units at 05/13/20 2020  •  ipratropium-albuterol (DUO-NEB) nebulizer solution 3 mL, 3 mL, Nebulization, Q6H PRN, Misha Hart MD  •  nicotine (NICODERM CQ) 21 MG/24HR patch 1 patch, 1 patch, Transdermal, Q24H, Misha Hart MD, 1 patch at 05/14/20 0902  •  ondansetron (ZOFRAN) tablet 4 mg, 4 mg, Oral, Q6H PRN **OR** ondansetron (ZOFRAN) injection 4 mg, 4 mg, Intravenous, Q6H PRN, Noel Zamudio MD, 4 mg at 05/13/20 0140  •  potassium phosphate 45 mmol in sodium chloride 0.9 % 250 mL infusion, 45 mmol, Intravenous, PRN **OR** potassium phosphate 30 mmol in sodium chloride 0.9 % 100 mL infusion, 30 mmol, Intravenous, PRN, Last Rate: 25 mL/hr at 05/14/20 0608, 30 mmol at 05/14/20 0608 **OR** potassium phosphate 15 mmol in sodium chloride 0.9 % 100 mL infusion, 15 mmol, Intravenous, PRN **OR** sodium phosphates 45 mmol in sodium chloride 0.9 % 250 mL IVPB, 45 mmol, Intravenous, PRN **OR** sodium phosphates 30 mmol in sodium chloride 0.9 % 100 mL IVPB, 30 mmol, Intravenous, PRN **OR** sodium phosphates 15 mmol in sodium chloride 0.9 % 100 mL IVPB, 15 mmol, Intravenous, PRN, Misah Hart MD, Last Rate: 50 mL/hr at 05/13/20 0548, 15 mmol at 05/13/20 0548  •  pramipexole (MIRAPEX) tablet 1 mg, 1 mg, Oral, Daily, Misha Hart MD, 1 mg at 05/14/20 0903  •  sodium bicarbonate 8.4 % 75 mEq in sodium chloride 0.45 % 1,000 mL infusion (greater than 75 mEq), 75 mEq, Intravenous, Continuous, Misha Hart MD, Stopped at 05/14/20 0903  •  sodium chloride 0.45 % 1,000 mL with potassium chloride 40 mEq infusion, 250 mL/hr, Intravenous, Continuous PRN, Noel Zamudio MD  •  sodium chloride 0.45 % infusion, 250 mL/hr, Intravenous, Continuous PRN, Noel Zamudio MD  •  sodium chloride 0.45 % with KCl 20 mEq/L infusion, 250  mL/hr, Intravenous, Continuous PRN, Noel Zamudio MD  •  [COMPLETED] Insert peripheral IV, , , Once **AND** sodium chloride 0.9 % flush 10 mL, 10 mL, Intravenous, PRN, Heriberto Springer PA-C  •  sodium chloride 0.9 % flush 10 mL, 10 mL, Intravenous, PRN, Noel Zamudio MD  •  sodium chloride 0.9 % flush 10 mL, 10 mL, Intravenous, Once PRN, Noel Zamudio MD  •  sodium chloride 0.9 % flush 10 mL, 10 mL, Intravenous, Q12H, Noel Zamudio MD, 10 mL at 05/14/20 0903  •  sodium chloride 0.9 % flush 10 mL, 10 mL, Intravenous, PRN, Noel Zamudio MD  •  sodium chloride 0.9 % flush 3 mL, 3 mL, Intravenous, Q12H, Noel Zamudio MD, 3 mL at 05/14/20 0904  •  sodium chloride 0.9 % flush 30 mL, 30 mL, Intravenous, Once PRN, Heriberto Springer PA-C  •  venlafaxine XR (EFFEXOR-XR) 24 hr capsule 75 mg, 75 mg, Oral, Daily, Misha Hart MD, 75 mg at 05/14/20 0903    Results Review:  I have reviewed the labs, radiology results, and diagnostic studies.    Laboratory Data:   Results from last 7 days   Lab Units 05/14/20  0435 05/13/20  1611 05/13/20  0558 05/13/20  0310  05/12/20  0610  05/11/20  2238 05/11/20  1803   SODIUM mmol/L 145  --  140 138   < > 134*   < > 112* 114*   POTASSIUM mmol/L 3.8 4.4 4.3 5.3*   < > 3.4*   < > 5.2 6.4*   CHLORIDE mmol/L 116*  --  115* 113*   < > 104   < > 77* 76*   CO2 mmol/L 16.0*  --  12.0* 9.0*   < > 13.0*   < > 3.0* 3.0*   BUN mg/dL 20  --  31* 36*   < > 49*   < > 57* 49*   CREATININE mg/dL 1.57*  --  1.71* 1.71*   < > 1.86*   < > 2.35* 2.03*   GLUCOSE mg/dL 116*  --  134* 207*   < > 426*   < > 1,192* 1,124*   CALCIUM mg/dL 8.3*  --  7.9* 7.6*   < > 7.4*   < > 8.3* 8.6   BILIRUBIN mg/dL  --   --   --   --   --  0.2  --  0.3 0.3   ALK PHOS U/L  --   --   --   --   --  227*  --  280* 268*   ALT (SGPT) U/L  --   --   --   --   --  41*  --  20 12   AST (SGOT) U/L  --   --   --   --   --  97*  --  43* 20   ANION GAP mmol/L 13.0  --  13.0 16.0*   < > 17.0*   < > 32.0* 35.0*    < > = values  in this interval not displayed.     Estimated Creatinine Clearance: 43.7 mL/min (A) (by C-G formula based on SCr of 1.57 mg/dL (H)).  Results from last 7 days   Lab Units 05/14/20  0435 05/13/20  1611 05/13/20  1014 05/13/20  0558 05/13/20  0310   MAGNESIUM mg/dL  --   --  1.6 1.5* 1.7   PHOSPHORUS mg/dL 1.7* 2.6 1.9* 1.7* 2.3*         Results from last 7 days   Lab Units 05/14/20  0435 05/12/20  0610 05/11/20  2238 05/11/20  1803   WBC 10*3/mm3 14.30* 5.05 19.62* 25.62*   HEMOGLOBIN g/dL 12.5 9.7* 10.8* 10.9*   HEMATOCRIT % 37.7 28.3* 37.4 38.9   PLATELETS 10*3/mm3 129* 194 288 336           Culture Data:   Blood Culture   Date Value Ref Range Status   05/11/2020 Escherichia coli (C)  Final   05/11/2020 Escherichia coli (C)  Final     Urine Culture   Date Value Ref Range Status   05/11/2020 >100,000 CFU/mL Escherichia coli (A)  Final     No results found for: RESPCX  No results found for: WOUNDCX  No results found for: STOOLCX  No components found for: BODYFLD    Radiology Data:   Imaging Results (Last 24 Hours)     ** No results found for the last 24 hours. **          I have reviewed the patient's current medications.     Assessment/Plan     Hospital Problem List:  Principal Problem:    Diabetic ketoacidosis without coma associated with diabetes mellitus due to underlying condition (CMS/[facility])  Active Problems:    DARRELL (acute kidney injury) (CMS/[facility])    Metabolic acidosis    HLD (hyperlipidemia)    Tobacco abuse    Hyponatremia    Dehydration    Acute metabolic encephalopathy    Anxiety    Gastroesophageal reflux disease    Hypothermia    Leukocytosis    Sepsis (CMS/[facility])    UTI (urinary tract infection)    Diabetic ketoacidosis (complicated by acute metabolic encephalopathy): DKA has essentially resolved.  Continue basal insulin with Accu-Cheks and sliding scale insulin.  Pseudohyponatremia is reactive and has resolved.      Acute kidney injury (with metabolic acidosis): This is likely prerenal.  Last creatinine on  record was 1.4 on 2/5/2018.  Creatinine is down to 1.57 today from a high of 2.35.  Continue IV hydration, sodium bicarbonate, avoid nephrotoxins, monitor renal function and consult nephrologist if the need arises.    Hypokalemia: Resolved.      Hypomagnesemia: Continue magnesium repletion protocol.    Hypophosphatemia: Continue phosphorus repletion protocol.    Sepsis secondary to acute cystitis: Patient did screen positive for sepsis.  Continue antimicrobial therapy.  Blood culture is positive for E. coli and urine culture is pending.    Leukocytosis and thrombocytopenia are reactive and will be monitored.      Nutrition: Patient is to remain n.p.o. for now pending improvement in her level of consciousness and assessment by speech-language pathologist.    Nicotine dependence: Continue nicotine patch.    Deconditioning: Consult PT and OT.    Continue GI and DVT prophylaxis.      Discharge Planning: In progress.    Misha Hart MD   05/14/20   10:05

## 2020-05-14 NOTE — PROGRESS NOTES
TWO PATIENT IDENTIFIERS WERE USED. THE PATIENT WAS DRAPED WITH A FULL BODY DRAPE AND THE PATIENT'S RIGHT ARM WAS PREPPED WITH CHLORA PREP. ULTRASOUND WAS USED TO LOCALIZE THERIGHT BASILIC VEIN. SUBCUTANEOUS TISSUE AT THE CATHETER SITE WAS INFILTRATED WITH 2% LIDOCAINE. UNDER ULTRASOUND GUIDANCE, THE VEIN WAS ACCESSED WITH A 21 GAUGE  NEEDLE. AN 0.018 WIRE WAS THEN THREADED THROUGH THE NEEDLE. THE 21 GAUGE NEEDLE WAS REMOVED AND A 4 Nepali SHEATH WAS PLACED OVER THE WIRE INTO THE VEIN.THE MIDLINE CATHETER WAS TRIMMED TO 20CM. THE MIDLINE CATHETER WAS THEN PLACED OVER THE WIRE INTO THE VEIN, THE SHEATH WAS PEELED AWAY, WIRE WAS REMOVED. CATHETER WAS FLUSHED WITH NORMAL SALINE AND CATHETER TIP APPLIED. BIOPATCH PLACED. CATHETER SECURED WITH STAT LOCK AND TEGADERM. PATIENT TOLERATED PROCEDURE WELL. THIS WAS DONE AT BEDSIDE      IMPRESSION:SUCCESSFUL PLACEMENT OF DUAL LUMEN MIDLINE.           Mabel Santos  5/14/2020  12:55 PM

## 2020-05-15 LAB
ANION GAP SERPL CALCULATED.3IONS-SCNC: 12 MMOL/L (ref 5–15)
BUN BLD-MCNC: 20 MG/DL (ref 6–20)
BUN/CREAT SERPL: 15.6 (ref 7–25)
CALCIUM SPEC-SCNC: 8.6 MG/DL (ref 8.6–10.5)
CHLORIDE SERPL-SCNC: 114 MMOL/L (ref 98–107)
CO2 SERPL-SCNC: 24 MMOL/L (ref 22–29)
CREAT BLD-MCNC: 1.28 MG/DL (ref 0.57–1)
GFR SERPL CREATININE-BSD FRML MDRD: 44 ML/MIN/1.73
GLUCOSE BLD-MCNC: 295 MG/DL (ref 65–99)
GLUCOSE BLDC GLUCOMTR-MCNC: 151 MG/DL (ref 70–130)
GLUCOSE BLDC GLUCOMTR-MCNC: 156 MG/DL (ref 70–130)
GLUCOSE BLDC GLUCOMTR-MCNC: 224 MG/DL (ref 70–130)
GLUCOSE BLDC GLUCOMTR-MCNC: 278 MG/DL (ref 70–130)
GLUCOSE BLDC GLUCOMTR-MCNC: 281 MG/DL (ref 70–130)
GLUCOSE BLDC GLUCOMTR-MCNC: 324 MG/DL (ref 70–130)
GLUCOSE BLDC GLUCOMTR-MCNC: 336 MG/DL (ref 70–130)
PHOSPHATE SERPL-MCNC: 2.9 MG/DL (ref 2.5–4.5)
PHOSPHATE SERPL-MCNC: 3.3 MG/DL (ref 2.5–4.5)
PHOSPHATE SERPL-MCNC: 3.5 MG/DL (ref 2.5–4.5)
POTASSIUM BLD-SCNC: 3.6 MMOL/L (ref 3.5–5.2)
SODIUM BLD-SCNC: 150 MMOL/L (ref 136–145)

## 2020-05-15 PROCEDURE — 84100 ASSAY OF PHOSPHORUS: CPT | Performed by: INTERNAL MEDICINE

## 2020-05-15 PROCEDURE — 97162 PT EVAL MOD COMPLEX 30 MIN: CPT

## 2020-05-15 PROCEDURE — 25010000002 ENOXAPARIN PER 10 MG: Performed by: HOSPITALIST

## 2020-05-15 PROCEDURE — 80048 BASIC METABOLIC PNL TOTAL CA: CPT | Performed by: INTERNAL MEDICINE

## 2020-05-15 PROCEDURE — 25010000002 CEFTRIAXONE PER 250 MG: Performed by: INTERNAL MEDICINE

## 2020-05-15 PROCEDURE — 63710000001 INSULIN ASPART PER 5 UNITS: Performed by: INTERNAL MEDICINE

## 2020-05-15 PROCEDURE — 25010000002 HYDROMORPHONE 1 MG/ML SOLUTION: Performed by: HOSPITALIST

## 2020-05-15 PROCEDURE — 82962 GLUCOSE BLOOD TEST: CPT

## 2020-05-15 PROCEDURE — 97166 OT EVAL MOD COMPLEX 45 MIN: CPT

## 2020-05-15 PROCEDURE — 92526 ORAL FUNCTION THERAPY: CPT | Performed by: SPEECH-LANGUAGE PATHOLOGIST

## 2020-05-15 PROCEDURE — 87040 BLOOD CULTURE FOR BACTERIA: CPT | Performed by: INTERNAL MEDICINE

## 2020-05-15 PROCEDURE — 63710000001 INSULIN DETEMIR PER 5 UNITS: Performed by: INTERNAL MEDICINE

## 2020-05-15 PROCEDURE — 25010000002 ENOXAPARIN PER 10 MG: Performed by: INTERNAL MEDICINE

## 2020-05-15 RX ORDER — SODIUM CHLORIDE 9 MG/ML
75 INJECTION, SOLUTION INTRAVENOUS CONTINUOUS
Status: DISCONTINUED | OUTPATIENT
Start: 2020-05-15 | End: 2020-05-15

## 2020-05-15 RX ORDER — GABAPENTIN 400 MG/1
800 CAPSULE ORAL EVERY 8 HOURS SCHEDULED
Status: DISCONTINUED | OUTPATIENT
Start: 2020-05-15 | End: 2020-05-18 | Stop reason: HOSPADM

## 2020-05-15 RX ORDER — ATORVASTATIN CALCIUM 10 MG/1
10 TABLET, FILM COATED ORAL DAILY
Status: DISCONTINUED | OUTPATIENT
Start: 2020-05-15 | End: 2020-05-18 | Stop reason: HOSPADM

## 2020-05-15 RX ORDER — DEXTROSE MONOHYDRATE 50 MG/ML
50 INJECTION, SOLUTION INTRAVENOUS CONTINUOUS
Status: DISCONTINUED | OUTPATIENT
Start: 2020-05-15 | End: 2020-05-16

## 2020-05-15 RX ORDER — BUSPIRONE HYDROCHLORIDE 10 MG/1
10 TABLET ORAL EVERY 12 HOURS SCHEDULED
Status: DISCONTINUED | OUTPATIENT
Start: 2020-05-15 | End: 2020-05-18 | Stop reason: HOSPADM

## 2020-05-15 RX ORDER — FAMOTIDINE 20 MG/1
20 TABLET, FILM COATED ORAL DAILY
Status: DISCONTINUED | OUTPATIENT
Start: 2020-05-15 | End: 2020-05-18 | Stop reason: HOSPADM

## 2020-05-15 RX ADMIN — SODIUM CHLORIDE, PRESERVATIVE FREE 10 ML: 5 INJECTION INTRAVENOUS at 08:37

## 2020-05-15 RX ADMIN — HYDROMORPHONE HYDROCHLORIDE 0.5 MG: 1 INJECTION, SOLUTION INTRAMUSCULAR; INTRAVENOUS; SUBCUTANEOUS at 12:00

## 2020-05-15 RX ADMIN — GABAPENTIN 800 MG: 400 CAPSULE ORAL at 16:01

## 2020-05-15 RX ADMIN — VENLAFAXINE HYDROCHLORIDE 75 MG: 75 CAPSULE, EXTENDED RELEASE ORAL at 08:36

## 2020-05-15 RX ADMIN — FAMOTIDINE 20 MG: 10 INJECTION INTRAVENOUS at 08:35

## 2020-05-15 RX ADMIN — ENOXAPARIN SODIUM 30 MG: 30 INJECTION SUBCUTANEOUS at 00:36

## 2020-05-15 RX ADMIN — INSULIN DETEMIR 15 UNITS: 100 INJECTION, SOLUTION SUBCUTANEOUS at 21:21

## 2020-05-15 RX ADMIN — SODIUM CHLORIDE, PRESERVATIVE FREE 10 ML: 5 INJECTION INTRAVENOUS at 23:47

## 2020-05-15 RX ADMIN — BUSPIRONE HYDROCHLORIDE 10 MG: 10 TABLET ORAL at 11:59

## 2020-05-15 RX ADMIN — PRAMIPEXOLE DIHYDROCHLORIDE 1 MG: 1 TABLET ORAL at 08:36

## 2020-05-15 RX ADMIN — ATORVASTATIN CALCIUM 10 MG: 10 TABLET, FILM COATED ORAL at 11:59

## 2020-05-15 RX ADMIN — SODIUM CHLORIDE 75 ML/HR: 9 INJECTION, SOLUTION INTRAVENOUS at 08:35

## 2020-05-15 RX ADMIN — INSULIN ASPART 3 UNITS: 100 INJECTION, SOLUTION INTRAVENOUS; SUBCUTANEOUS at 16:32

## 2020-05-15 RX ADMIN — HYDROMORPHONE HYDROCHLORIDE 0.5 MG: 1 INJECTION, SOLUTION INTRAMUSCULAR; INTRAVENOUS; SUBCUTANEOUS at 02:57

## 2020-05-15 RX ADMIN — GABAPENTIN 800 MG: 400 CAPSULE ORAL at 21:21

## 2020-05-15 RX ADMIN — INSULIN ASPART 4 UNITS: 100 INJECTION, SOLUTION INTRAVENOUS; SUBCUTANEOUS at 08:36

## 2020-05-15 RX ADMIN — HYDROMORPHONE HYDROCHLORIDE 0.5 MG: 1 INJECTION, SOLUTION INTRAMUSCULAR; INTRAVENOUS; SUBCUTANEOUS at 09:06

## 2020-05-15 RX ADMIN — NICOTINE 1 PATCH: 21 PATCH, EXTENDED RELEASE TRANSDERMAL at 08:36

## 2020-05-15 RX ADMIN — DEXTROSE MONOHYDRATE 50 ML/HR: 50 INJECTION, SOLUTION INTRAVENOUS at 11:06

## 2020-05-15 RX ADMIN — INSULIN ASPART 4 UNITS: 100 INJECTION, SOLUTION INTRAVENOUS; SUBCUTANEOUS at 12:00

## 2020-05-15 RX ADMIN — ACETAMINOPHEN 650 MG: 325 TABLET, FILM COATED ORAL at 23:46

## 2020-05-15 RX ADMIN — ENOXAPARIN SODIUM 30 MG: 30 INJECTION SUBCUTANEOUS at 21:21

## 2020-05-15 RX ADMIN — HYDROMORPHONE HYDROCHLORIDE 0.5 MG: 1 INJECTION, SOLUTION INTRAMUSCULAR; INTRAVENOUS; SUBCUTANEOUS at 00:36

## 2020-05-15 RX ADMIN — HYDROMORPHONE HYDROCHLORIDE 0.5 MG: 1 INJECTION, SOLUTION INTRAMUSCULAR; INTRAVENOUS; SUBCUTANEOUS at 05:28

## 2020-05-15 RX ADMIN — CEFTRIAXONE SODIUM 2 G: 2 INJECTION, POWDER, FOR SOLUTION INTRAMUSCULAR; INTRAVENOUS at 11:06

## 2020-05-15 RX ADMIN — BUSPIRONE HYDROCHLORIDE 10 MG: 10 TABLET ORAL at 21:21

## 2020-05-15 NOTE — PROGRESS NOTES
[facility] Medicine Services  INPATIENT PROGRESS NOTE    Length of Stay: 4  Date of Admission: 5/11/2020  Primary Care Physician: Provider, No Known    Subjective   Chief Complaint: No new changes.    HPI:    The patient is a 49-year-old white female with history of diabetes mellitus, GERD, hyperlipidemia, anxiety, tobacco abuse and noncompliance who was admitted with history of feeling extremely weak and with some confusion.  The patient was evaluated in the ER noted to have very elevated blood sugar of over thousand with diabetic ketoacidosis.  She appeared very dehydrated and her creatinine was elevated at 2.  White count was elevated at 25,000 and she was hypothermic on admission requiring Jarrod hugger to warm her up.  The patient was also hyponatremic with sodium of 114.  The patient was given some fluid boluses and some insulin and cultures were taken and she was started on broad-spectrum antibiotics in the ER.  Urinalysis showed changes suggesting possible UTI.    Patient is seen for follow-up today.  She is much improved, less confused and tolerating oral intake.    Review of Systems   Constitutional: Positive for activity change and fatigue. Negative for appetite change, chills, diaphoresis and fever.   HENT: Negative for trouble swallowing and voice change.    Eyes: Negative for photophobia and visual disturbance.   Respiratory: Negative for cough, choking, chest tightness, shortness of breath, wheezing and stridor.    Cardiovascular: Negative for chest pain, palpitations and leg swelling.   Gastrointestinal: Negative for abdominal distention, abdominal pain, blood in stool, constipation, diarrhea, nausea and vomiting.   Endocrine: Negative for cold intolerance, heat intolerance, polydipsia, polyphagia and polyuria.   Genitourinary: Negative for decreased urine volume, difficulty urinating, dysuria, enuresis, flank pain, frequency, hematuria and urgency.      Musculoskeletal: Negative for arthralgias, gait problem, myalgias, neck pain and neck stiffness.   Skin: Negative for pallor, rash and wound.   Neurological: Negative for dizziness, tremors, seizures, syncope, facial asymmetry, speech difficulty, weakness, light-headedness, numbness and headaches.   Hematological: Does not bruise/bleed easily.   Psychiatric/Behavioral: Negative for agitation, behavioral problems and confusion.       Objective    Temp:  [97.4 °F (36.3 °C)-97.8 °F (36.6 °C)] 97.8 °F (36.6 °C)  Heart Rate:  [] 92  Resp:  [16-20] 16  BP: (136-177)/(56-77) 152/76    Physical Exam   Constitutional: She is oriented to person, place, and time. She appears well-developed and well-nourished. She is cooperative. No distress.   HENT:   Head: Normocephalic and atraumatic.   Right Ear: External ear normal.   Left Ear: External ear normal.   Nose: Nose normal.   Mouth/Throat: Oropharynx is clear and moist.   Eyes: Conjunctivae are normal.   Neck: Neck supple. No JVD present. No thyromegaly present.   Cardiovascular: Normal rate, regular rhythm, normal heart sounds and intact distal pulses. Exam reveals no gallop and no friction rub.   No murmur heard.  Pulmonary/Chest: Effort normal and breath sounds normal. No stridor. No respiratory distress. She has no wheezes. She has no rales. She exhibits no tenderness.   Abdominal: Soft. Bowel sounds are normal. She exhibits no distension and no mass. There is no tenderness. There is no rebound and no guarding. No hernia.   Musculoskeletal: She exhibits no edema, tenderness or deformity.   Neurological: She is alert and oriented to person, place, and time. She has normal reflexes. No cranial nerve deficit or sensory deficit. She exhibits normal muscle tone. Coordination normal.       Skin: Skin is warm and dry. No rash noted. She is not diaphoretic. No erythema. No pallor.   Psychiatric: She has a normal mood and affect. Her behavior is normal. Judgment and thought  content normal.   Nursing note and vitals reviewed.        Medication Review:    Current Facility-Administered Medications:   •  acetaminophen (TYLENOL) tablet 650 mg, 650 mg, Oral, Q4H PRN **OR** [DISCONTINUED] acetaminophen (TYLENOL) 160 MG/5ML solution 650 mg, 650 mg, Oral, Q4H PRN **OR** acetaminophen (TYLENOL) suppository 650 mg, 650 mg, Rectal, Q4H PRN, Noel Zamudio MD, 650 mg at 05/14/20 0422  •  cefTRIAXone (ROCEPHIN) 2 g/100 mL 0.9% NS VTB (LEONOR), 2 g, Intravenous, Q24H, Misha Hart MD, 2 g at 05/14/20 1146  •  dextrose (D50W) 25 g/ 50mL Intravenous Solution 25 g, 25 g, Intravenous, Q15 Min PRN, Misha Hart MD, 25 g at 05/14/20 1209  •  dextrose (GLUTOSE) oral gel 15 g, 15 g, Oral, Q15 Min PRN, Misha Hart MD  •  enoxaparin (LOVENOX) syringe 30 mg, 30 mg, Subcutaneous, Q24H, Noel Zamudio MD, 30 mg at 05/15/20 0036  •  famotidine (PEPCID) injection 20 mg, 20 mg, Intravenous, Daily, Noel Zamudio MD, 20 mg at 05/15/20 0835  •  glucagon (human recombinant) (GLUCAGEN DIAGNOSTIC) injection 1 mg, 1 mg, Subcutaneous, Q15 Min PRN, Misha Hart MD  •  HYDROmorphone (DILAUDID) injection 0.5 mg, 0.5 mg, Intravenous, Q2H PRN, Noel Zamudio MD, 0.5 mg at 05/15/20 0906  •  insulin aspart (novoLOG) injection 0-7 Units, 0-7 Units, Subcutaneous, TID AC, Misha Hart MD, 4 Units at 05/15/20 0836  •  insulin detemir (LEVEMIR) injection 10 Units, 10 Units, Subcutaneous, Nightly, Misha Hart MD, 10 Units at 05/14/20 2353  •  ipratropium-albuterol (DUO-NEB) nebulizer solution 3 mL, 3 mL, Nebulization, Q6H PRN, Misha Hart MD  •  nicotine (NICODERM CQ) 21 MG/24HR patch 1 patch, 1 patch, Transdermal, Q24H, Misha Hart MD, 1 patch at 05/15/20 0836  •  ondansetron (ZOFRAN) tablet 4 mg, 4 mg, Oral, Q6H PRN **OR** ondansetron (ZOFRAN) injection 4 mg, 4 mg, Intravenous, Q6H PRN, Noel Zamudio MD, 4 mg at 05/13/20 0140  •  potassium phosphate 45 mmol in sodium chloride  0.9 % 250 mL infusion, 45 mmol, Intravenous, PRN **OR** potassium phosphate 30 mmol in sodium chloride 0.9 % 100 mL infusion, 30 mmol, Intravenous, PRN, Last Rate: 25 mL/hr at 05/14/20 0608, 30 mmol at 05/14/20 0608 **OR** potassium phosphate 15 mmol in sodium chloride 0.9 % 100 mL infusion, 15 mmol, Intravenous, PRN **OR** sodium phosphates 45 mmol in sodium chloride 0.9 % 250 mL IVPB, 45 mmol, Intravenous, PRN **OR** sodium phosphates 30 mmol in sodium chloride 0.9 % 100 mL IVPB, 30 mmol, Intravenous, PRN **OR** sodium phosphates 15 mmol in sodium chloride 0.9 % 100 mL IVPB, 15 mmol, Intravenous, PRN, Misha Hart MD, Last Rate: 50 mL/hr at 05/13/20 0548, 15 mmol at 05/13/20 0548  •  pramipexole (MIRAPEX) tablet 1 mg, 1 mg, Oral, Daily, Misha Hart MD, 1 mg at 05/15/20 0836  •  sodium chloride 0.45 % 1,000 mL with potassium chloride 40 mEq infusion, 250 mL/hr, Intravenous, Continuous PRN, Noel Zamudio MD  •  sodium chloride 0.45 % infusion, 250 mL/hr, Intravenous, Continuous PRN, Noel Zamudio MD  •  sodium chloride 0.45 % with KCl 20 mEq/L infusion, 250 mL/hr, Intravenous, Continuous PRN, Noel Zamudio MD  •  [COMPLETED] Insert peripheral IV, , , Once **AND** sodium chloride 0.9 % flush 10 mL, 10 mL, Intravenous, PRN, Heriberto Sprigner PA-C  •  sodium chloride 0.9 % flush 10 mL, 10 mL, Intravenous, PRN, Noel Zamudio MD  •  sodium chloride 0.9 % flush 10 mL, 10 mL, Intravenous, Once PRN, Noel Zamudio MD  •  sodium chloride 0.9 % flush 10 mL, 10 mL, Intravenous, Q12H, Noel Zamudio MD, 10 mL at 05/15/20 0837  •  sodium chloride 0.9 % flush 10 mL, 10 mL, Intravenous, PRN, Noel Zamudio MD  •  sodium chloride 0.9 % flush 3 mL, 3 mL, Intravenous, Q12H, Noel Zamudio MD, 3 mL at 05/14/20 0904  •  sodium chloride 0.9 % flush 30 mL, 30 mL, Intravenous, Once PRN, Heriberto Springer, PA-C  •  sodium chloride 0.9 % infusion, 75 mL/hr, Intravenous, Continuous, Misha Hart MD, Last  Rate: 75 mL/hr at 05/15/20 0835, 75 mL/hr at 05/15/20 0835  •  venlafaxine XR (EFFEXOR-XR) 24 hr capsule 75 mg, 75 mg, Oral, Daily, Misha Hart MD, 75 mg at 05/15/20 0836    Results Review:  I have reviewed the labs, radiology results, and diagnostic studies.    Laboratory Data:   Results from last 7 days   Lab Units 05/15/20  0520 05/14/20  0435 05/13/20  1611 05/13/20  0558  05/12/20  0610  05/11/20  2238 05/11/20  1803   SODIUM mmol/L 150* 145  --  140   < > 134*   < > 112* 114*   POTASSIUM mmol/L 3.6 3.8 4.4 4.3   < > 3.4*   < > 5.2 6.4*   CHLORIDE mmol/L 114* 116*  --  115*   < > 104   < > 77* 76*   CO2 mmol/L 24.0 16.0*  --  12.0*   < > 13.0*   < > 3.0* 3.0*   BUN mg/dL 20 20  --  31*   < > 49*   < > 57* 49*   CREATININE mg/dL 1.28* 1.57*  --  1.71*   < > 1.86*   < > 2.35* 2.03*   GLUCOSE mg/dL 295* 116*  --  134*   < > 426*   < > 1,192* 1,124*   CALCIUM mg/dL 8.6 8.3*  --  7.9*   < > 7.4*   < > 8.3* 8.6   BILIRUBIN mg/dL  --   --   --   --   --  0.2  --  0.3 0.3   ALK PHOS U/L  --   --   --   --   --  227*  --  280* 268*   ALT (SGPT) U/L  --   --   --   --   --  41*  --  20 12   AST (SGOT) U/L  --   --   --   --   --  97*  --  43* 20   ANION GAP mmol/L 12.0 13.0  --  13.0   < > 17.0*   < > 32.0* 35.0*    < > = values in this interval not displayed.     Estimated Creatinine Clearance: 53 mL/min (A) (by C-G formula based on SCr of 1.28 mg/dL (H)).  Results from last 7 days   Lab Units 05/15/20  0520 05/15/20  0115 05/14/20  1807  05/14/20  0435  05/13/20  1014 05/13/20  0558   MAGNESIUM mg/dL  --   --   --   --  1.6  --  1.6 1.5*   PHOSPHORUS mg/dL 3.5 3.3 2.8   < > 1.7*   < > 1.9* 1.7*    < > = values in this interval not displayed.         Results from last 7 days   Lab Units 05/14/20  0435 05/12/20  0610 05/11/20  2238 05/11/20  1803   WBC 10*3/mm3 14.30* 5.05 19.62* 25.62*   HEMOGLOBIN g/dL 12.5 9.7* 10.8* 10.9*   HEMATOCRIT % 37.7 28.3* 37.4 38.9   PLATELETS 10*3/mm3 129* 194 288 336                Culture Data:   No results found for: BLOODCX  No results found for: URINECX  No results found for: RESPCX  No results found for: WOUNDCX  No results found for: STOOLCX  No components found for: BODYFLD    Radiology Data:   Imaging Results (Last 24 Hours)     Procedure Component Value Units Date/Time    US Guided Vascular Access [[id number]] Resulted:  05/14/20 1313     Updated:  05/14/20 1313    Narrative:       This procedure was auto-finalized with no dictation required.    IR Insert Midline Without Port Pump 5 Plus [[id number]] Resulted:  05/14/20 1256     Updated:  05/14/20 1256    Narrative:       This procedure was auto-finalized with no dictation required.          I have reviewed the patient's current medications.     Assessment/Plan     Hospital Problem List:  Principal Problem:    Diabetic ketoacidosis without coma associated with diabetes mellitus due to underlying condition (CMS/[facility])  Active Problems:    DARRELL (acute kidney injury) (CMS/[facility])    Metabolic acidosis    HLD (hyperlipidemia)    Tobacco abuse    Hyponatremia    Dehydration    Acute metabolic encephalopathy    Anxiety    Gastroesophageal reflux disease    Hypothermia    Leukocytosis    Sepsis (CMS/[facility])    UTI (urinary tract infection)    Diabetic ketoacidosis (complicated by acute metabolic encephalopathy): DKA has resolved and patient's mental status is essentially back to baseline.   Continue basal insulin with Accu-Cheks and sliding scale insulin.    Hypernatremia: Begin D5W and monitor BMP.       Acute kidney injury (with metabolic acidosis): This is likely prerenal.  Last creatinine on record was 1.4 on 2/5/2018.  Creatinine is down to 1.21 from a high of 2.35 metabolic acidosis is resolved. Continue IV hydration, avoid nephrotoxins, monitor renal function and consult nephrologist if the need arises.    Hypokalemia: Resolved.      Hypophosphatemia: Resolved.      Sepsis secondary to acute cystitis: Patient did screen positive for sepsis.   Continue antimicrobial therapy.  Blood and urine cultures showed E. Coli.  Repeat blood culture today.    Nicotine dependence: Continue nicotine patch.    Continue GI and DVT prophylaxis.    Deconditioning: Continue PT and OT.    Transfer out of CCU.    Discharge Planning: Likely discharge in any Hart MD   05/15/20   09:29

## 2020-05-15 NOTE — PLAN OF CARE
Problem: Patient Care Overview  Goal: Plan of Care Review  Outcome: Ongoing (interventions implemented as appropriate)  Flowsheets (Taken 5/15/2020 7744)  Plan of Care Reviewed With: patient  Outcome Summary: OT Eval completed on this date. Pt alert and agreeable to therapy, however, poorly cooperative and very lethargic. Bed Mobility: Conditional Independent Transfers: CGA Grooming: Supervision with set-up to comb hair, wash face, and application of lotion to BLE LBD: Min A for don/doff socks while sitting EOB Feeding: Min A for eating while sitting EOB and Supervision for bring liquids to mouth. Pt demos decreased activity tolerance, decreased strength, decreased balance, and decreased safety awareness. Pt would benefit from continued skilled OT to address functional deficits. Recommend continued skilled OT to address functional deficits. Anticipate therapy at next level of care.

## 2020-05-15 NOTE — THERAPY EVALUATION
Acute Care - Occupational Therapy Initial Evaluation  [facility]     Patient Name: Ingrid Ortiz  : 1970  MRN: [id number]  Today's Date: 5/15/2020  Onset of Illness/Injury or Date of Surgery: 20  Date of Referral to OT: 05/15/20       Admit Date: 2020       ICD-10-CM ICD-9-CM   1. Diabetic ketoacidosis without coma associated with diabetes mellitus due to underlying condition (CMS/[facility]) E08.10 249.11   2. Acute renal failure, unspecified acute renal failure type (CMS/HCC) N17.9 584.9   3. Hypothermia, initial encounter T68.XXXA 991.6   4. Oropharyngeal dysphagia R13.12 787.22   5. Impaired mobility and activities of daily living Z74.09 799.89     Patient Active Problem List   Diagnosis   • Diabetic ketoacidosis without coma associated with diabetes mellitus due to underlying condition (CMS/HCC)   • DARRELL (acute kidney injury) (CMS/[facility])   • Metabolic acidosis   • HLD (hyperlipidemia)   • Tobacco abuse   • Hyponatremia   • Dehydration   • Acute metabolic encephalopathy   • Anxiety   • Chronic obstructive pulmonary disease (CMS/HCC)   • Diabetes mellitus (CMS/HCC)   • Gastroesophageal reflux disease   • Non compliance with medical treatment   • Hyponatremia   • Hypothermia   • Leukocytosis   • Sepsis (CMS/HCC)   • UTI (urinary tract infection)     Past Medical History:   Diagnosis Date   • Diabetes (CMS/HCC)    • GERD (gastroesophageal reflux disease)    • HLD (hyperlipidemia)      Past Surgical History:   Procedure Laterality Date   • CERVICAL DISC SURGERY     • CHOLECYSTECTOMY     • HYSTERECTOMY     • TUBAL ABDOMINAL LIGATION            OT ASSESSMENT FLOWSHEET (last 12 hours)      Occupational Therapy Evaluation     Row Name 05/15/20 5020                   OT Evaluation Time/Intention    Subjective Information  complains of;weakness;fatigue  -        Document Type  evaluation  -        Mode of Treatment  individual therapy;occupational therapy  -        Patient Effort  adequate  -            General Information    Patient Profile Reviewed?  yes  -        Onset of Illness/Injury or Date of Surgery  05/11/20  -        Patient Observations  poorly cooperative;alert;agree to therapy  -        Patient/Family Observations  No family present  -        General Observations of Patient  Supine in bed  -        Prior Level of Function  independent:;all household mobility;community mobility;transfer;gait;ADL's;driving  -        Existing Precautions/Restrictions  fall  -        Equipment Issued to Patient  gait belt  -        Risks Reviewed  patient:;LOB;nausea/vomiting;dizziness;increased discomfort;change in vital signs;increased drainage;lines disloged  -        Benefits Reviewed  patient:;improve function;increase independence;increase strength;increase balance;decrease pain;decrease risk of DVT;improve skin integrity;increase knowledge  -           Relationship/Environment    Lives With  sibling(s)  -        Family Caregiver if Needed  sibling(s)  -           Resource/Environmental Concerns    Current Living Arrangements  home/apartment/condo  -        Resource/Environmental Concerns  none  -           Home Main Entrance    Number of Stairs, Main Entrance  four  -        Stair Railings, Main Entrance  railings on both sides of stairs  -           Cognitive Assessment/Interventions    Additional Documentation  Cognitive Assessment/Intervention (Group)  -           Cognitive Assessment/Intervention- PT/OT    Affect/Mental Status (Cognitive)  low arousal/lethargic;flat/blunted affect  -        Orientation Status (Cognition)  oriented to;person;place;time  -        Follows Commands (Cognition)  follows one step commands;over 90% accuracy  -        Cognitive Function (Cognitive)  WFL  -           Safety Issues, Functional Mobility    Safety Issues Affecting Function (Mobility)  ability to follow commands;awareness of need for assistance;impulsivity;insight into  deficits/self awareness;judgment;problem solving;safety precaution awareness;safety precautions follow-through/compliance  -        Impairments Affecting Function (Mobility)  balance;endurance/activity tolerance;strength;visual/perceptual;postural/trunk control;cognition  -           Bed Mobility Assessment/Treatment    Bed Mobility Assessment/Treatment  supine-sit;sit-supine  -        Supine-Sit [place] (Bed Mobility)  conditional independence  -        Sit-Supine [place] (Bed Mobility)  conditional independence  -           Transfer Assessment/Treatment    Transfer Assessment/Treatment  sit-stand transfer;stand-sit transfer  -           Sit-Stand Transfer    Sit-Stand [place] (Transfers)  contact guard  -           Stand-Sit Transfer    Stand-Sit [place] (Transfers)  contact guard  -JH           ADL Assessment/Intervention    BADL Assessment/Intervention  lower body dressing;grooming;feeding  -           Lower Body Dressing Assessment/Training    Lower Body Dressing [place] Level  doff;don;socks;minimum assist (75% patient effort);set up  -        Lower Body Dressing Position  edge of bed [facility]  -           Grooming Assessment/Training    [place] Level (Grooming)  hair care, combing/brushing;wash face, hands;supervision;set up  -        Grooming Position  supported sitting  [facility]           Self-Feeding Assessment/Training    [place] Level (Feeding)  liquids to mouth;finger foods;minimum assist (75% patient effort)  -        Position (Self-Feeding)  edge of bed sitting  -           BADL Safety/Performance    Impairments, BADL Safety/Performance  balance;endurance/activity tolerance;cognition;trunk/postural control;strength  -        Cognitive Impairments, BADL Safety/Performance  awareness, need for assistance;impulsivity;insight into deficits/self awareness;judgment;problem solving/reasoning;safety precaution awareness;safety precaution follow-through   -           General ROM    GENERAL ROM COMMENTS  BUE ROM WFL  -           MMT (Manual Muscle Testing)    General MMT Comments  BUE MMT Strength 4-/5 Grossly  -           Sensory Assessment/Intervention    Sensory General Assessment  no sensation deficits identified  -           Positioning and Restraints    Pre-Treatment Position  in bed  -        Post Treatment Position  bed  -        In Bed  supine;call light within reach;encouraged to call for assist;notified nsg;patient within staff view  -           Pain Assessment    Additional Documentation  Pain Scale: Numbers Pre/Post-Treatment (Group)  [facility]           Pain Scale: Numbers Pre/Post-Treatment    Pain Scale: Numbers, Pretreatment  0/10 - no pain  -        Pain Scale: Numbers, Post-Treatment  0/10 - no pain  -           Plan of Care Review    Plan of Care Reviewed With  patient  -           Clinical Impression (OT)    Date of Referral to OT  05/15/20  -        OT Diagnosis  Impaired Mobility and ADL's  -        Criteria for Skilled Therapeutic Interventions Met (OT Eval)  yes;treatment indicated  -        Rehab Potential (OT Eval)  good, to achieve stated therapy goals  -        Therapy Frequency (OT Eval)  daily  -        Predicted Duration of Therapy Intervention (Therapy Eval)  Until goals met or d/c  -        Care Plan Review (OT)  evaluation/treatment results reviewed;care plan/treatment goals reviewed;risks/benefits reviewed;current/potential barriers reviewed;patient/other agree to care plan  -        Anticipated Discharge Disposition (OT)  anticipate therapy at next level of care  -           Vital Signs    Pre Systolic BP Rehab  159  -JH        Pre Treatment Diastolic BP  80  -        Post Systolic BP Rehab  167  -JH        Post Treatment Diastolic BP  83  -        Pretreatment Heart Rate (beats/min)  97  -        Posttreatment Heart Rate (beats/min)  87  -        Pre SpO2 (%)  97  -        O2 Delivery Pre  Treatment  room air  -JH        Post SpO2 (%)  97  -JH        O2 Delivery Post Treatment  room air  -JH        Pre Patient Position  Supine  -JH        Intra Patient Position  Sitting  -JH        Post Patient Position  Supine  -JH           Planned OT Interventions    Planned Therapy Interventions (OT Eval)  activity tolerance training;adaptive equipment training;BADL retraining;transfer/mobility retraining;strengthening exercise;ROM/therapeutic exercise;patient/caregiver education/training;occupation/activity based interventions;neuromuscular control/coordination retraining;functional balance retraining  -           OT Goals    Transfer Goal Selection (OT)  transfer, OT goal 1  -JH        Bathing Goal Selection (OT)  bathing, OT goal 1  -JH        Dressing Goal Selection (OT)  dressing, OT goal 1  -JH        Toileting Goal Selection (OT)  toileting, OT goal 1  -JH        Grooming Goal Selection (OT)  grooming, OT goal 1  -JH        Activity Tolerance Goal Selection (OT)  activity tolerance, OT goal 1  -JH        Additional Documentation  Activity Tolerance Goal Selection (OT) (Row);Grooming Goal Selection (OT) (Row)  -           Transfer Goal 1 (OT)    Activity/Assistive Device (Transfer Goal 1, OT)  transfers, all  -        [place] Level/Cues Needed (Transfer Goal 1, OT)  conditional independence;verbal cues required;tactile cues required;set-up required  -        Time Frame (Transfer Goal 1, OT)  long term goal (LTG);by discharge  -        Progress/Outcome (Transfer Goal 1, OT)  goal not met  -JH           Bathing Goal 1 (OT)    Activity/Assistive Device (Bathing Goal 1, OT)  bathing skills, all  -        [place] Level/Cues Needed (Bathing Goal 1, OT)  standby assist;verbal cues required;tactile cues required;set-up required  -        Time Frame (Bathing Goal 1, OT)  long term goal (LTG);by discharge  -        Progress/Outcomes (Bathing Goal 1, OT)  goal not met  -           Dressing  Goal 1 (OT)    Activity/Assistive Device (Dressing Goal 1, OT)  dressing skills, all  -        [place]/Cues Needed (Dressing Goal 1, OT)  standby assist;verbal cues required;tactile cues required;set-up required  -        Time Frame (Dressing Goal 1, OT)  long term goal (LTG);by discharge  -        Progress/Outcome (Dressing Goal 1, OT)  goal not met  -           Toileting Goal 1 (OT)    Activity/Device (Toileting Goal 1, OT)  toileting skills, all  -        [place] Level/Cues Needed (Toileting Goal 1, OT)  standby assist;verbal cues required;tactile cues required;set-up required  -        Time Frame (Toileting Goal 1, OT)  long term goal (LTG);by discharge  -        Progress/Outcome (Toileting Goal 1, OT)  goal not met  -           Grooming Goal 1 (OT)    Activity/Device (Grooming Goal 1, OT)  grooming skills, all  -JH        [place] (Grooming Goal 1, OT)  standby assist;verbal cues required;tactile cues required;set-up required  -        Time Frame (Grooming Goal 1, OT)  long term goal (LTG);by discharge  -        Progress/Outcome (Grooming Goal 1, OT)  goal not met  -            Activity Tolerance Goal 1 (OT)    Activity Level (Endurance Goal 1, OT)  -- 15 min functional activity with 1-2 rest breaks  -        Time Frame (Activity Tolerance Goal 1, OT)  long term goal (LTG);by discharge  -        Progress/Outcome (Activity Tolerance Goal 1, OT)  goal not met  -           Living Environment    Home Accessibility  stairs to enter home  -          User Key  (r) = Recorded By, (t) = Taken By, (c) = Cosigned By    Initials Name Effective Dates     Jeremiah Lorenz OT 09/10/19 -          Occupational Therapy Education                 Title: PT OT SLP Therapies (In Progress)     Topic: Occupational Therapy (In Progress)     Point: ADL training (Not Started)     Description:   Instruct learner(s) on proper safety adaptation and remediation techniques during self care or  transfers.   Instruct in proper use of assistive devices.              Learner Progress:   Not documented in this visit.          Point: Home exercise program (Not Started)     Description:   Instruct learner(s) on appropriate technique for monitoring, assisting and/or progressing therapeutic exercises/activities.              Learner Progress:   Not documented in this visit.          Point: Precautions (Done)     Description:   Instruct learner(s) on prescribed precautions during self-care and functional transfers.              Learning Progress Summary           Patient Acceptance, E, VU,NR by  at 5/15/2020 0822    Comment:  Pt educated on role of OT, d/c recs, and POC.                   Point: Body mechanics (Not Started)     Description:   Instruct learner(s) on proper positioning and spine alignment during self-care, functional mobility activities and/or exercises.              Learner Progress:   Not documented in this visit.                      User Key     Initials Effective Dates Name Provider Type Discipline     09/10/19 -  Jeremiah Lorenz OT Occupational Therapist OT                  OT Recommendation and Plan  Outcome Summary/Treatment Plan (OT)  Anticipated Discharge Disposition (OT): anticipate therapy at next level of care  Planned Therapy Interventions (OT Eval): activity tolerance training, adaptive equipment training, BADL retraining, transfer/mobility retraining, strengthening exercise, ROM/therapeutic exercise, patient/caregiver education/training, occupation/activity based interventions, neuromuscular control/coordination retraining, functional balance retraining  Therapy Frequency (OT Eval): daily  Plan of Care Review  Plan of Care Reviewed With: patient  Plan of Care Reviewed With: patient  Outcome Summary: OT Eval completed on this date. Pt alert and agreeable to therapy, however, poorly cooperative and very lethargic. Bed Mobility: Conditional Independent Transfers: CGA Grooming:  Supervision with set-up to comb hair, wash face, and application of lotion to BLE LBD: Min A for don/doff socks while sitting EOB Feeding: Min A for eating while sitting EOB and Supervision for bring liquids to mouth. Pt demos decreased activity tolerance, decreased strength, decreased balance, and decreased safety awareness. Pt would benefit from continued skilled OT to address functional deficits. Recommend continued skilled OT to address functional deficits. Anticipate therapy at next level of care.    Outcome Measures     Row Name 05/15/20 1340             How much help from another is currently needed...    Putting on and taking off regular lower body clothing?  3  -JH      Bathing (including washing, rinsing, and drying)  3  -JH      Toileting (which includes using toilet bed pan or urinal)  3  -      Putting on and taking off regular upper body clothing  3  -      Taking care of personal grooming (such as brushing teeth)  3  -      Eating meals  3  -      AM-PAC 6 Clicks Score (OT)  18  -         Functional Assessment    Outcome Measure Options  AM-PAC 6 Clicks Daily Activity (OT)  -        User Key  (r) = Recorded By, (t) = Taken By, (c) = Cosigned By    Initials Name Provider Type    Jeremiah Fajardo OT Occupational Therapist          Time Calculation:   Time Calculation- OT     Row Name 05/15/20 1438             Time Calculation-     OT Start Time  1340  -      OT Stop Time  1436  -      OT Time Calculation (min)  56 min  -      OT Received On  05/15/20  -      OT Goal Re-Cert Due Date  05/28/20  -        User Key  (r) = Recorded By, (t) = Taken By, (c) = Cosigned By    Initials Name Provider Type    Jeremiah Fajardo OT Occupational Therapist        Therapy Charges for Today     Code Description Service Date Service Provider Modifiers Qty    [id number]  OT EVAL MOD COMPLEXITY 4 5/15/2020 Jeremiah Lorenz OT GO 1               Jeremiah Lorenz OT  5/15/2020

## 2020-05-15 NOTE — PLAN OF CARE
Problem: Patient Care Overview  Goal: Plan of Care Review  Outcome: Ongoing (interventions implemented as appropriate)  Flowsheets (Taken 5/15/2020 0815)  Progress: improving  Plan of Care Reviewed With: patient  Outcome Summary: dysphagia therapy: pt is more alert and has increased oral awarenss.  she is weak and unable to self feed, although she tries.  pt states she does wear dentures, but does not have them with her in hospital.  pt states she can eat soft foods without them and demonstrated safe oral/pharyngeal phase with cereal in milk.  pt also consumed 480cc liquid via straw (sprite, applejuice, water, coffee) with no s/s of aspiration.  pt diet upgraded and SLP will f/u for diet tolerance v/s upgrade to regular if approprieate without dentures.

## 2020-05-15 NOTE — PLAN OF CARE
Problem: Patient Care Overview  Goal: Plan of Care Review  Outcome: Ongoing (interventions implemented as appropriate)  Flowsheets  Taken 5/15/2020 1622  Plan of Care Reviewed With: patient  Taken 5/15/2020 1527  Outcome Summary: PT eval completed in room prior to transfer to floor; she is lethargic but able to move w/out abnormal tone; Depending on her judgement for safety and mobility when she is more alert, she may  need assist at home; she has good rehab potential and can benefit from  PT for endurance if goals not met here. Due to sister being at home w/ her and disabled, they may need assist of others depending on progress of this patient.

## 2020-05-15 NOTE — THERAPY EVALUATION
Acute Care - Physical Therapy Initial Evaluation  [facility]     Patient Name: Ingrid Ortiz  : 1970  MRN: [id number]  Today's Date: 5/15/2020   Onset of Illness/Injury or Date of Surgery: 20            Admit Date: 2020    Visit Dx:     ICD-10-CM ICD-9-CM   1. Diabetic ketoacidosis without coma associated with diabetes mellitus due to underlying condition (CMS/HCC) E08.10 249.11   2. Acute renal failure, unspecified acute renal failure type (CMS/HCC) N17.9 584.9   3. Hypothermia, initial encounter T68.XXXA 991.6   4. Oropharyngeal dysphagia R13.12 787.22   5. Impaired mobility and activities of daily living Z74.09 799.89   6. Impaired functional mobility, balance, gait, and endurance Z74.09 V49.89     Patient Active Problem List   Diagnosis   • Diabetic ketoacidosis without coma associated with diabetes mellitus due to underlying condition (CMS/HCC)   • DARRELL (acute kidney injury) (CMS/HCC)   • Metabolic acidosis   • HLD (hyperlipidemia)   • Tobacco abuse   • Hyponatremia   • Dehydration   • Acute metabolic encephalopathy   • Anxiety   • Chronic obstructive pulmonary disease (CMS/HCC)   • Diabetes mellitus (CMS/HCC)   • Gastroesophageal reflux disease   • Non compliance with medical treatment   • Hyponatremia   • Hypothermia   • Leukocytosis   • Sepsis (CMS/HCC)   • UTI (urinary tract infection)     Past Medical History:   Diagnosis Date   • Diabetes (CMS/HCC)    • GERD (gastroesophageal reflux disease)    • HLD (hyperlipidemia)      Past Surgical History:   Procedure Laterality Date   • CERVICAL DISC SURGERY     • CHOLECYSTECTOMY     • HYSTERECTOMY     • TUBAL ABDOMINAL LIGATION          PT ASSESSMENT (last 12 hours)      Physical Therapy Evaluation     Row Name 05/15/20 1527          PT Evaluation Time/Intention    Document Type  evaluation  -GB     Mode of Treatment  individual therapy;physical therapy  -GB     Patient Effort  adequate  -GB     Row Name 05/15/20 1527          General  Information    Patient Profile Reviewed?  yes  -GB     Onset of Illness/Injury or Date of Surgery  05/11/20  -GB     Patient Observations  alert;agree to therapy;lethargic  -GB     Prior Level of Function  independent:;all household mobility;community mobility;gait;ADL's;driving  -GB     Existing Precautions/Restrictions  fall  -GB     Equipment Issued to Patient  gait belt  -GB     Risks Reviewed  patient:;LOB;dizziness  -GB     Benefits Reviewed  patient:;improve function;increase independence;increase strength  -GB     Row Name 05/15/20 1527          Relationship/Environment    Lives With  sibling(s)  -GB     Row Name 05/15/20 1527          Resource/Environmental Concerns    Current Living Arrangements  home/apartment/condo  -GB     Row Name 05/15/20 1527          Living Environment    Home Accessibility  stairs to enter home  -GB     Row Name 05/15/20 1527          Home Main Entrance    Number of Stairs, Main Entrance  four  -GB     Stair Railings, Main Entrance  railings on both sides of stairs  -GB     Row Name 05/15/20 1527          Cognitive Assessment/Intervention- PT/OT    Affect/Mental Status (Cognitive)  low arousal/lethargic;flat/blunted affect  -GB     Orientation Status (Cognition)  oriented to;person;place;time  -GB     Follows Commands (Cognition)  follows one step commands;over 90% accuracy  -GB     Cognitive Function (Cognitive)  WFL  -GB     Row Name 05/15/20 1527          Safety Issues, Functional Mobility    Impairments Affecting Function (Mobility)  balance;endurance/activity tolerance;strength;visual/perceptual;postural/trunk control;cognition  -GB     Row Name 05/15/20 1527          Bed Mobility Assessment/Treatment    Bed Mobility Assessment/Treatment  supine-sit;sit-supine  -GB     Supine-Sit [place] (Bed Mobility)  conditional independence  -GB     Sit-Supine [place] (Bed Mobility)  conditional independence  -GB     Row Name 05/15/20 1527          Transfer Assessment/Treatment     Transfer Assessment/Treatment  sit-stand transfer;stand-sit transfer  -     Sit-Stand [place] (Transfers)  contact guard;minimum assist (75% patient effort)  -     Stand-Sit [place] (Transfers)  contact guard  -GB     Row Name 05/15/20 1527          Gait/Stairs Assessment/Training    Gait/Stairs Assessment/Training  gait/ambulation independence;distance ambulated;gait pattern;gait deviations  -     [place] Level (Gait)  minimum assist (75% patient effort)  -     Distance in Feet (Gait)  -- 4,4 with min assist and cruising on bed for support  -     Pattern (Gait)  step-through  -     Deviations/Abnormal Patterns (Gait)  skye decreased  -     Bilateral Gait Deviations  forward flexed posture  -[facility] Name 05/15/20 1527          General ROM    GENERAL ROM COMMENTS  samia UE/LEs AROM WFL  -[facility] Name 05/15/20 1527          MMT (Manual Muscle Testing)    General MMT Comments  samia UEs grossly 4/5 flx/ext shoulder, elbow and ; samia  LE hip, knee and ankle flx/ext grossly 4/5; needed repeated cues for MMT due to lethargy  -[facility] Name 05/15/20 1527          Sensory Assessment/Intervention    Sensory General Assessment  no sensation deficits identified  -GB     Row Name 05/15/20 1527          Pain Scale: Numbers Pre/Post-Treatment    Pain Scale: Numbers, Pretreatment  0/10 - no pain  -     Pain Scale: Numbers, Post-Treatment  0/10 - no pain  -GB     Row Name 05/15/20 1527          Plan of Care Review    Plan of Care Reviewed With  patient  -     Outcome Summary  PT eval completed in room prior to transfer to floor; she is lethargic but able to move w/out abnormal tone; Depending on her judgement for safety and mobility when she is more alert, she may  need assist at home; she has good rehab potential and can benefit from  PT for endurance if goals not met here. Due to sister being at home w/ her and disabled, they may need assist of others depending on progress of this  patient.    -GB     Row Name 05/15/20 1527          Physical Therapy Clinical Impression    Criteria for Skilled Interventions Met (PT Clinical Impression)  yes  -GB     Pathology/Pathophysiology Noted (Describe Specifically for Each System)  musculoskeletal  -GB     Rehab Potential (PT Clinical Summary)  good, to achieve stated therapy goals  -GB     Predicted Duration of Therapy (PT)  till d/c or goals met, 4 days  -GB     Care Plan Review (PT)  patient/other agree to care plan  -GB     Row Name 05/15/20 1527          Vital Signs    Pre Systolic BP Rehab  167  -GB     Pre Treatment Diastolic BP  83  -GB     Intra Systolic BP Rehab  163  -GB     Intra Treatment Diastolic BP  72  -GB     Post Systolic BP Rehab  172  -GB     Post Treatment Diastolic BP  101 RN notified  -GB     Pretreatment Heart Rate (beats/min)  [place] [facility] [place]     Intratreatment Heart Rate (beats/min)  87  -GB     Posttreatment Heart Rate (beats/min)  87  -GB     Pre SpO2 (%)  100  -GB     O2 Delivery Pre Treatment  room air  -GB     Post SpO2 (%)  100  -GB     O2 Delivery Post Treatment  room air  -GB     Pre Patient Position  Supine  -GB     Intra Patient Position  Standing  -GB     Post Patient Position  Sitting cross legged sitting up in bed; asks for trazadone  -GB     Row Name 05/15/20 1527          Physical Therapy Goals    Gait Training Goal Selection (PT)  gait training, PT goal 1;gait training, PT goal (free text)  -GB     Stairs Goal Selection (PT)  stairs, PT goal 1  -GB     Additional Documentation  Stairs Goal Selection (PT) (Row)  -GB     Row Name 05/15/20 1527          Gait Training Goal 1 (PT)    Activity/Assistive Device (Gait Training Goal 1, PT)  gait (walking locomotion)  -GB     [place] Level (Gait Training Goal 1, PT)  independent  -GB     Distance (Gait Goal 1, PT)  300 ft or more w;/ VSS  -GB     Time Frame (Gait Training Goal 1, PT)  by discharge  -GB     Barriers (Gait Training Goal 1, PT)   ex patricia  -GB     Progress/Outcome (Gait Training Goal 1, PT)  goal not met  -GB     Row Name 05/15/20 1527          Gait Training Goal (PT)    Gait Training Goal (PT)  successful completion of TUG and Tinetti for low fall risk score before determining indep gait ; VSS  -GB     Time Frame (Gait Training Goal, PT)  by discharge  -GB     Barriers (Gait Training Goal, PT)  ex patricia  -GB     Progress/Outcome (Gait Training Goal, PT)  goal not met  -GB     Row Name 05/15/20 1527          Stairs Goal 1 (PT)    Activity/Assistive Device (Stairs Goal 1, PT)  ascending stairs;descending stairs  -GB     [place] Level/Cues Needed (Stairs Goal 1, PT)  independent  -GB     Number of Stairs (Stairs Goal 1, PT)  4 or more  -GB     Time Frame (Stairs Goal 1, PT)  by discharge  -GB     Barriers (Stairs Goal 1, PT)  weakness  -GB     Progress/Outcome (Stairs Goal 1, PT)  goal not met  -GB     Row Name 05/15/20 1527          Positioning and Restraints    Pre-Treatment Position  in bed  -GB     Post Treatment Position  bed  -GB     In Bed  side lying right;call light within reach;encouraged to call for assist;exit alarm on;patient within staff view  -GB       User Key  (r) = Recorded By, (t) = Taken By, (c) = Cosigned By    Initials Name Provider Type    Damari Fernandez, PT Physical Therapist        Physical Therapy Education                 Title: PT OT SLP Therapies (In Progress)     Topic: Physical Therapy (In Progress)     Point: Mobility training (Done)     Description:   Instruct learner(s) on safety and technique for assisting patient out of bed, chair or wheelchair.  Instruct in the proper use of assistive devices, such as walker, crutches, cane or brace.              Patient Friendly Description:   It's important to get you on your feet again, but we need to do so in a way that is safe for you. Falling has serious consequences, and your personal safety is the most important thing of all.        When it's time to  get out of bed, one of us or a family member will sit next to you on the bed to give you support.     If your doctor or nurse tells you to use a walker, crutches, a cane, or a brace, be sure you use it every time you get out of bed, even if you think you don't need it.    Learning Progress Summary           Patient Acceptance, E, VU,NR by BRIANDA at 5/15/2020 6138                   Point: Home exercise program (Not Started)     Description:   Instruct learner(s) on appropriate technique for monitoring, assisting and/or progressing patient with therapeutic exercises and activities.              Learner Progress:   Not documented in this visit.          Point: Body mechanics (Not Started)     Description:   Instruct learner(s) on proper positioning and spine alignment for patient and/or caregiver during mobility tasks and/or exercises.              Learner Progress:   Not documented in this visit.          Point: Precautions (Not Started)     Description:   Instruct learner(s) on prescribed precautions during mobility and gait tasks              Learner Progress:   Not documented in this visit.                      User Key     Initials Effective Dates Name Provider Type Discipline     04/03/18 -  Damari Jeffries, PT Physical Therapist PT              PT Recommendation and Plan  Anticipated Discharge Disposition (PT): home with assist, home with home health  Planned Therapy Interventions (PT Eval): bed mobility training, gait training, home exercise program, patient/family education, stair training, strengthening  Therapy Frequency (PT Clinical Impression): daily(6-11 visits per week)  Outcome Summary/Treatment Plan (PT)  Anticipated Equipment Needs at Discharge (PT): front wheeled walker  Anticipated Discharge Disposition (PT): home with assist, home with home health  Plan of Care Reviewed With: patient  Outcome Summary: PT eval completed in room prior to transfer to floor; she is lethargic but able to move  w/out abnormal tone; Depending on her judgement for safety and mobility when she is more alert, she may  need assist at home; she has good rehab potential and can benefit from  PT for endurance if goals not met here. Due to sister being at home w/ her and disabled, they may need assist of others depending on progress of this patient.    Outcome Measures     Row Name 05/15/20 1600 05/15/20 1340          How much help from another person do you currently need...    Turning from your back to your side while in flat bed without using bedrails?  4  -GB  --     Moving from lying on back to sitting on the side of a flat bed without bedrails?  3  -GB  --     Moving to and from a bed to a chair (including a wheelchair)?  3  -GB  --     Standing up from a chair using your arms (e.g., wheelchair, bedside chair)?  3  -GB  --     Climbing 3-5 steps with a railing?  2  -GB  --     To walk in hospital room?  3  -GB  --     AM-PAC 6 Clicks Score (PT)  18  -GB  --        How much help from another is currently needed...    Putting on and taking off regular lower body clothing?  --  3  -JH     Bathing (including washing, rinsing, and drying)  --  3  -JH     Toileting (which includes using toilet bed pan or urinal)  --  3  -JH     Putting on and taking off regular upper body clothing  --  3  -JH     Taking care of personal grooming (such as brushing teeth)  --  3  -JH     Eating meals  --  3  -JH     AM-PAC 6 Clicks Score (OT)  --  18  -JH        Functional Assessment    Outcome Measure Options  AM-PAC 6 Clicks Basic Mobility (PT)  -  AM-PAC 6 Clicks Daily Activity (OT)  -       User Key  (r) = Recorded By, (t) = Taken By, (c) = Cosigned By    Initials Name Provider Type    Damari Fernandez, PT Physical Therapist    Jeremiah Fajardo, OT Occupational Therapist         Time Calculation:   PT Charges     Row Name 05/15/20 1626             Time Calculation    Start Time  1527  -GB      Stop Time  1552  -GB      Time  Calculation (min)  25 min  -GB      PT Received On  05/15/20  -GB      PT Goal Re-Cert Due Date  05/24/20  -GB        User Key  (r) = Recorded By, (t) = Taken By, (c) = Cosigned By    Initials Name Provider Type    Damari Fernandez, PT Physical Therapist        Therapy Charges for Today     Code Description Service Date Service Provider Modifiers Qty    28287657619 HC PT EVAL MOD COMPLEXITY 2 5/15/2020 Damari Jeffries, PT GP 1          PT G-Codes  Outcome Measure Options: AM-PAC 6 Clicks Basic Mobility (PT)  AM-PAC 6 Clicks Score (PT): 18  AM-PAC 6 Clicks Score (OT): 18      Damari Jeffries, FLO  5/15/2020

## 2020-05-15 NOTE — CONSULTS
Nutrition Services    Patient Name:  Ingrid Ortiz  YOB: 1970  MRN: [id number]  Admit Date:  5/11/2020    Pt calm and resting.  She was seen by SLP this am and was upgraded to a soft diet with grd meats.  Per staff she was fed by SLP this am and has still been very confused.    Hypernatremia with high Na+ of 150.  Bicarb drip discontinues and pt started on NaCl @ 75cc/hr.  Will add milk with meals and monitor.      Electronically signed by:  Lalitha Plasencia RD  05/15/20 12:07

## 2020-05-15 NOTE — THERAPY TREATMENT NOTE
Acute Care - Speech Language Pathology   Swallow Treatment Note [facility]     Patient Name: Ingrid Ortiz  : 1970  MRN: [id number]  Today's Date: 5/15/2020               Admit Date: 2020    Visit Dx:      ICD-10-CM ICD-9-CM   1. Diabetic ketoacidosis without coma associated with diabetes mellitus due to underlying condition (CMS/[facility]) E08.10 249.11   2. Acute renal failure, unspecified acute renal failure type (CMS/[facility]) N17.9 584.9   3. Hypothermia, initial encounter T68.XXXA 991.6   4. Oropharyngeal dysphagia R13.12 787.22     Patient Active Problem List   Diagnosis   • Diabetic ketoacidosis without coma associated with diabetes mellitus due to underlying condition (CMS/[facility])   • DARRELL (acute kidney injury) (CMS/[facility])   • Metabolic acidosis   • HLD (hyperlipidemia)   • Tobacco abuse   • Hyponatremia   • Dehydration   • Acute metabolic encephalopathy   • Anxiety   • Chronic obstructive pulmonary disease (CMS/[facility])   • Diabetes mellitus (CMS/[facility])   • Gastroesophageal reflux disease   • Non compliance with medical treatment   • Hyponatremia   • Hypothermia   • Leukocytosis   • Sepsis (CMS/[facility])   • UTI (urinary tract infection)       Therapy Treatment  Rehabilitation Treatment Summary     Row Name 05/15/20 0745             Treatment Time/Intention    Discipline  speech language pathologist  -EC      Document Type  therapy note (daily note)  -EC      Subjective Information  no complaints  -EC      Mode of Treatment  individual therapy  -EC      Patient/Family Observations  no family present  -EC      Therapy Frequency (Swallow)  3 days per week;5 days per week  -EC      Patient Effort  fair  -EC      Comment  pt remains restless and some confusion  -EC      Recorded by [EC] Raquel Moran CCC-SLP 05/15/20 0875      Row Name 05/15/20 0745             Positioning and Restraints    Pre-Treatment Position  in bed  -EC      Post Treatment Position  bed  -EC      In Bed  fowlers;sitting;call light  within reach;encouraged to call for assist;exit alarm on;patient within staff view  -EC      Restraints  released:;other (comment) by nursing prior to start of session  -EC      Recorded by [EC] Raquel Moran CCC-SLP 05/15/20 0813      Row Name 05/15/20 0745             Pain Scale: Numbers Pre/Post-Treatment    Pain Scale: Numbers, Pretreatment  0/10 - no pain  -EC      Pain Scale: Numbers, Post-Treatment  0/10 - no pain  -EC      Recorded by [EC] Raquel Moran CCC-SLP 05/15/20 0813        User Key  (r) = Recorded By, (t) = Taken By, (c) = Cosigned By    Initials Name Effective Dates Discipline    EC Raquel Moran CCC-SLP 02/11/20 -  SLP          Outcome Summary         SLP GOALS     Row Name 05/15/20 0745 05/14/20 1133          Oral Nutrition/Hydration Goal 1 (SLP)    Oral Nutrition/Hydration Goal 1, SLP  pt to tolerate recommended diet for safe and adequate nutrition/hydratiion  -EC  pt to tolerate recommended diet for safe and adequate nutrition/hydratiion  -EC     Time Frame (Oral Nutrition/Hydration Goal 1, SLP)  by discharge  -EC  by discharge  -EC     Barriers (Oral Nutrition/Hydration Goal 1, SLP)  cognition/ oral awareness  -EC  cognition/ oral awareness  -EC     Progress/Outcomes (Oral Nutrition/Hydration Goal 1, SLP)  goal partially met  -EC  goal ongoing  -EC       User Key  (r) = Recorded By, (t) = Taken By, (c) = Cosigned By    Initials Name Provider Type    EC Raquel Moran CCC-SLP Speech and Language Pathologist          EDUCATION  The patient has been educated in the following areas:   Dysphagia (Swallowing Impairment) Modified Diet Instruction.    SLP Recommendation and Plan                                Time Calculation:   Time Calculation- SLP     Row Name 05/15/20 0818             Time Calculation- SLP    SLP Start Time  0745  -EC      SLP Stop Time  0815  -EC      SLP Time Calculation (min)  30 min  -EC      Total Timed Code Minutes- SLP  30 minute(s)  -EC      SLP  Received On  05/15/20  -        User Key  (r) = Recorded By, (t) = Taken By, (c) = Cosigned By    Initials Name Provider Type    Raquel Mojica CCC-SLP Speech and Language Pathologist          Therapy Charges for Today     Code Description Service Date Service Provider Modifiers Qty    46481966709  ST EVAL ORAL PHARYNG SWALLOW 2 5/14/2020 Raquel Moran CCC-SLP GN 1    [id number]  ST TREATMENT SWALLOW 2 5/15/2020 Raquel Moran CCC-SLP GN 1                 DANIELLE Richards  5/15/2020

## 2020-05-16 LAB
ALBUMIN SERPL-MCNC: 2.2 G/DL (ref 3.5–5.2)
ALBUMIN/GLOB SERPL: 0.6 G/DL
ALP SERPL-CCNC: 203 U/L (ref 39–117)
ALT SERPL W P-5'-P-CCNC: 20 U/L (ref 1–33)
ANION GAP SERPL CALCULATED.3IONS-SCNC: 11 MMOL/L (ref 5–15)
ANION GAP SERPL CALCULATED.3IONS-SCNC: 15 MMOL/L (ref 5–15)
ANION GAP SERPL CALCULATED.3IONS-SCNC: 22 MMOL/L (ref 5–15)
ANION GAP SERPL CALCULATED.3IONS-SCNC: 22 MMOL/L (ref 5–15)
AST SERPL-CCNC: 12 U/L (ref 1–32)
BASOPHILS # BLD AUTO: 0.04 10*3/MM3 (ref 0–0.2)
BASOPHILS # BLD AUTO: 0.04 10*3/MM3 (ref 0–0.2)
BASOPHILS NFR BLD AUTO: 0.3 % (ref 0–1.5)
BASOPHILS NFR BLD AUTO: 0.4 % (ref 0–1.5)
BILIRUB SERPL-MCNC: 0.2 MG/DL (ref 0.2–1.2)
BUN BLD-MCNC: 14 MG/DL (ref 6–20)
BUN BLD-MCNC: 24 MG/DL (ref 6–20)
BUN BLD-MCNC: 27 MG/DL (ref 6–20)
BUN BLD-MCNC: 27 MG/DL (ref 6–20)
BUN/CREAT SERPL: 13.1 (ref 7–25)
BUN/CREAT SERPL: 16.6 (ref 7–25)
BUN/CREAT SERPL: 18.1 (ref 7–25)
BUN/CREAT SERPL: 18.4 (ref 7–25)
CALCIUM SPEC-SCNC: 8.2 MG/DL (ref 8.6–10.5)
CALCIUM SPEC-SCNC: 8.4 MG/DL (ref 8.6–10.5)
CHLORIDE SERPL-SCNC: 100 MMOL/L (ref 98–107)
CHLORIDE SERPL-SCNC: 90 MMOL/L (ref 98–107)
CHLORIDE SERPL-SCNC: 91 MMOL/L (ref 98–107)
CHLORIDE SERPL-SCNC: 98 MMOL/L (ref 98–107)
CO2 SERPL-SCNC: 18 MMOL/L (ref 22–29)
CO2 SERPL-SCNC: 20 MMOL/L (ref 22–29)
CO2 SERPL-SCNC: 23 MMOL/L (ref 22–29)
CO2 SERPL-SCNC: 27 MMOL/L (ref 22–29)
CREAT BLD-MCNC: 1.07 MG/DL (ref 0.57–1)
CREAT BLD-MCNC: 1.45 MG/DL (ref 0.57–1)
CREAT BLD-MCNC: 1.47 MG/DL (ref 0.57–1)
CREAT BLD-MCNC: 1.49 MG/DL (ref 0.57–1)
DEPRECATED RDW RBC AUTO: 47.4 FL (ref 37–54)
DEPRECATED RDW RBC AUTO: 51.1 FL (ref 37–54)
EOSINOPHIL # BLD AUTO: 0.01 10*3/MM3 (ref 0–0.4)
EOSINOPHIL # BLD AUTO: 0.13 10*3/MM3 (ref 0–0.4)
EOSINOPHIL NFR BLD AUTO: 0.1 % (ref 0.3–6.2)
EOSINOPHIL NFR BLD AUTO: 1.2 % (ref 0.3–6.2)
ERYTHROCYTE [DISTWIDTH] IN BLOOD BY AUTOMATED COUNT: 15.1 % (ref 12.3–15.4)
ERYTHROCYTE [DISTWIDTH] IN BLOOD BY AUTOMATED COUNT: 15.5 % (ref 12.3–15.4)
GFR SERPL CREATININE-BSD FRML MDRD: 37 ML/MIN/1.73
GFR SERPL CREATININE-BSD FRML MDRD: 38 ML/MIN/1.73
GFR SERPL CREATININE-BSD FRML MDRD: 38 ML/MIN/1.73
GFR SERPL CREATININE-BSD FRML MDRD: 54 ML/MIN/1.73
GLOBULIN UR ELPH-MCNC: 3.6 GM/DL
GLUCOSE BLD-MCNC: 173 MG/DL (ref 65–99)
GLUCOSE BLD-MCNC: 504 MG/DL (ref 65–99)
GLUCOSE BLD-MCNC: 721 MG/DL (ref 65–99)
GLUCOSE BLD-MCNC: 723 MG/DL (ref 65–99)
GLUCOSE BLDC GLUCOMTR-MCNC: 107 MG/DL (ref 70–130)
GLUCOSE BLDC GLUCOMTR-MCNC: 114 MG/DL (ref 70–130)
GLUCOSE BLDC GLUCOMTR-MCNC: 266 MG/DL (ref 70–130)
GLUCOSE BLDC GLUCOMTR-MCNC: 306 MG/DL (ref 70–130)
GLUCOSE BLDC GLUCOMTR-MCNC: 37 MG/DL (ref 70–130)
GLUCOSE BLDC GLUCOMTR-MCNC: 46 MG/DL (ref 70–130)
HBA1C MFR BLD: 11.6 % (ref 4.8–5.6)
HCG SERPL QL: NEGATIVE
HCT VFR BLD AUTO: 24 % (ref 34–46.6)
HCT VFR BLD AUTO: 28.5 % (ref 34–46.6)
HGB BLD-MCNC: 8.1 G/DL (ref 12–15.9)
HGB BLD-MCNC: 9 G/DL (ref 12–15.9)
IMM GRANULOCYTES # BLD AUTO: 0.17 10*3/MM3 (ref 0–0.05)
IMM GRANULOCYTES # BLD AUTO: 0.22 10*3/MM3 (ref 0–0.05)
IMM GRANULOCYTES NFR BLD AUTO: 1.5 % (ref 0–0.5)
IMM GRANULOCYTES NFR BLD AUTO: 1.9 % (ref 0–0.5)
LYMPHOCYTES # BLD AUTO: 0.96 10*3/MM3 (ref 0.7–3.1)
LYMPHOCYTES # BLD AUTO: 1.59 10*3/MM3 (ref 0.7–3.1)
LYMPHOCYTES NFR BLD AUTO: 14.4 % (ref 19.6–45.3)
LYMPHOCYTES NFR BLD AUTO: 8.2 % (ref 19.6–45.3)
MAGNESIUM SERPL-MCNC: 1.6 MG/DL (ref 1.6–2.6)
MCH RBC QN AUTO: 28.8 PG (ref 26.6–33)
MCH RBC QN AUTO: 29 PG (ref 26.6–33)
MCHC RBC AUTO-ENTMCNC: 31.6 G/DL (ref 31.5–35.7)
MCHC RBC AUTO-ENTMCNC: 33.8 G/DL (ref 31.5–35.7)
MCV RBC AUTO: 86 FL (ref 79–97)
MCV RBC AUTO: 91.3 FL (ref 79–97)
MONOCYTES # BLD AUTO: 0.8 10*3/MM3 (ref 0.1–0.9)
MONOCYTES # BLD AUTO: 0.92 10*3/MM3 (ref 0.1–0.9)
MONOCYTES NFR BLD AUTO: 6.8 % (ref 5–12)
MONOCYTES NFR BLD AUTO: 8.3 % (ref 5–12)
NEUTROPHILS # BLD AUTO: 8.19 10*3/MM3 (ref 1.7–7)
NEUTROPHILS # BLD AUTO: 9.74 10*3/MM3 (ref 1.7–7)
NEUTROPHILS NFR BLD AUTO: 74.2 % (ref 42.7–76)
NEUTROPHILS NFR BLD AUTO: 82.7 % (ref 42.7–76)
NRBC BLD AUTO-RTO: 0 /100 WBC (ref 0–0.2)
NRBC BLD AUTO-RTO: 0 /100 WBC (ref 0–0.2)
OSMOLALITY SERPL: 320 MOSM/KG (ref 280–290)
PHOSPHATE SERPL-MCNC: 3.3 MG/DL (ref 2.5–4.5)
PLATELET # BLD AUTO: 183 10*3/MM3 (ref 140–450)
PLATELET # BLD AUTO: 199 10*3/MM3 (ref 140–450)
PMV BLD AUTO: 10.9 FL (ref 6–12)
PMV BLD AUTO: 10.9 FL (ref 6–12)
POTASSIUM BLD-SCNC: 3.1 MMOL/L (ref 3.5–5.2)
POTASSIUM BLD-SCNC: 3.4 MMOL/L (ref 3.5–5.2)
POTASSIUM BLD-SCNC: 3.7 MMOL/L (ref 3.5–5.2)
POTASSIUM BLD-SCNC: 4.7 MMOL/L (ref 3.5–5.2)
PROT SERPL-MCNC: 5.8 G/DL (ref 6–8.5)
RBC # BLD AUTO: 2.79 10*6/MM3 (ref 3.77–5.28)
RBC # BLD AUTO: 3.12 10*6/MM3 (ref 3.77–5.28)
SODIUM BLD-SCNC: 131 MMOL/L (ref 136–145)
SODIUM BLD-SCNC: 132 MMOL/L (ref 136–145)
SODIUM BLD-SCNC: 136 MMOL/L (ref 136–145)
SODIUM BLD-SCNC: 138 MMOL/L (ref 136–145)
WBC NRBC COR # BLD: 11.04 10*3/MM3 (ref 3.4–10.8)
WBC NRBC COR # BLD: 11.77 10*3/MM3 (ref 3.4–10.8)

## 2020-05-16 PROCEDURE — 63710000001 INSULIN DETEMIR PER 5 UNITS: Performed by: INTERNAL MEDICINE

## 2020-05-16 PROCEDURE — 84100 ASSAY OF PHOSPHORUS: CPT | Performed by: INTERNAL MEDICINE

## 2020-05-16 PROCEDURE — 92526 ORAL FUNCTION THERAPY: CPT | Performed by: SPEECH-LANGUAGE PATHOLOGIST

## 2020-05-16 PROCEDURE — 25010000002 ENOXAPARIN PER 10 MG: Performed by: INTERNAL MEDICINE

## 2020-05-16 PROCEDURE — 84703 CHORIONIC GONADOTROPIN ASSAY: CPT | Performed by: INTERNAL MEDICINE

## 2020-05-16 PROCEDURE — 63710000001 INSULIN ASPART PER 5 UNITS: Performed by: INTERNAL MEDICINE

## 2020-05-16 PROCEDURE — 82962 GLUCOSE BLOOD TEST: CPT

## 2020-05-16 PROCEDURE — 97116 GAIT TRAINING THERAPY: CPT

## 2020-05-16 PROCEDURE — 83735 ASSAY OF MAGNESIUM: CPT | Performed by: INTERNAL MEDICINE

## 2020-05-16 PROCEDURE — 25010000002 CEFTRIAXONE PER 250 MG: Performed by: INTERNAL MEDICINE

## 2020-05-16 PROCEDURE — 85025 COMPLETE CBC W/AUTO DIFF WBC: CPT | Performed by: INTERNAL MEDICINE

## 2020-05-16 PROCEDURE — 97530 THERAPEUTIC ACTIVITIES: CPT

## 2020-05-16 PROCEDURE — 80053 COMPREHEN METABOLIC PANEL: CPT | Performed by: INTERNAL MEDICINE

## 2020-05-16 PROCEDURE — 63710000001 INSULIN REGULAR HUMAN PER 5 UNITS: Performed by: INTERNAL MEDICINE

## 2020-05-16 PROCEDURE — 25010000003 INSULIN REGULAR HUMAN PER 5 UNITS: Performed by: INTERNAL MEDICINE

## 2020-05-16 PROCEDURE — 83036 HEMOGLOBIN GLYCOSYLATED A1C: CPT | Performed by: INTERNAL MEDICINE

## 2020-05-16 PROCEDURE — 83930 ASSAY OF BLOOD OSMOLALITY: CPT | Performed by: INTERNAL MEDICINE

## 2020-05-16 PROCEDURE — 97535 SELF CARE MNGMENT TRAINING: CPT

## 2020-05-16 RX ORDER — SODIUM CHLORIDE 450 MG/100ML
250 INJECTION, SOLUTION INTRAVENOUS CONTINUOUS PRN
Status: DISCONTINUED | OUTPATIENT
Start: 2020-05-16 | End: 2020-05-16

## 2020-05-16 RX ORDER — DEXTROSE AND SODIUM CHLORIDE 5; .9 G/100ML; G/100ML
150 INJECTION, SOLUTION INTRAVENOUS CONTINUOUS PRN
Status: DISCONTINUED | OUTPATIENT
Start: 2020-05-16 | End: 2020-05-17

## 2020-05-16 RX ORDER — DOCUSATE SODIUM 100 MG/1
100 CAPSULE, LIQUID FILLED ORAL DAILY
Status: DISCONTINUED | OUTPATIENT
Start: 2020-05-17 | End: 2020-05-18 | Stop reason: HOSPADM

## 2020-05-16 RX ORDER — DEXTROSE, SODIUM CHLORIDE, AND POTASSIUM CHLORIDE 5; .45; .15 G/100ML; G/100ML; G/100ML
150 INJECTION INTRAVENOUS CONTINUOUS PRN
Status: DISCONTINUED | OUTPATIENT
Start: 2020-05-16 | End: 2020-05-17

## 2020-05-16 RX ORDER — SODIUM CHLORIDE AND POTASSIUM CHLORIDE 300; 900 MG/100ML; MG/100ML
250 INJECTION, SOLUTION INTRAVENOUS CONTINUOUS PRN
Status: DISCONTINUED | OUTPATIENT
Start: 2020-05-16 | End: 2020-05-18 | Stop reason: HOSPADM

## 2020-05-16 RX ORDER — SODIUM CHLORIDE 9 MG/ML
75 INJECTION, SOLUTION INTRAVENOUS CONTINUOUS
Status: DISCONTINUED | OUTPATIENT
Start: 2020-05-16 | End: 2020-05-18 | Stop reason: HOSPADM

## 2020-05-16 RX ORDER — POTASSIUM CHLORIDE, DEXTROSE MONOHYDRATE AND SODIUM CHLORIDE 300; 5; 900 MG/100ML; G/100ML; MG/100ML
150 INJECTION, SOLUTION INTRAVENOUS CONTINUOUS PRN
Status: DISCONTINUED | OUTPATIENT
Start: 2020-05-16 | End: 2020-05-16

## 2020-05-16 RX ORDER — SODIUM CHLORIDE 0.9 % (FLUSH) 0.9 %
10 SYRINGE (ML) INJECTION AS NEEDED
Status: DISCONTINUED | OUTPATIENT
Start: 2020-05-16 | End: 2020-05-16

## 2020-05-16 RX ORDER — SODIUM CHLORIDE AND POTASSIUM CHLORIDE 150; 900 MG/100ML; MG/100ML
250 INJECTION, SOLUTION INTRAVENOUS CONTINUOUS PRN
Status: DISCONTINUED | OUTPATIENT
Start: 2020-05-16 | End: 2020-05-18 | Stop reason: HOSPADM

## 2020-05-16 RX ORDER — HYDRALAZINE HYDROCHLORIDE 20 MG/ML
10 INJECTION INTRAMUSCULAR; INTRAVENOUS EVERY 6 HOURS PRN
Status: DISCONTINUED | OUTPATIENT
Start: 2020-05-16 | End: 2020-05-18 | Stop reason: HOSPADM

## 2020-05-16 RX ORDER — DEXTROSE, SODIUM CHLORIDE, AND POTASSIUM CHLORIDE 5; .9; .15 G/100ML; G/100ML; G/100ML
150 INJECTION INTRAVENOUS CONTINUOUS PRN
Status: DISCONTINUED | OUTPATIENT
Start: 2020-05-16 | End: 2020-05-16

## 2020-05-16 RX ORDER — SODIUM CHLORIDE 9 MG/ML
250 INJECTION, SOLUTION INTRAVENOUS CONTINUOUS PRN
Status: DISCONTINUED | OUTPATIENT
Start: 2020-05-16 | End: 2020-05-18 | Stop reason: HOSPADM

## 2020-05-16 RX ORDER — SODIUM CHLORIDE AND POTASSIUM CHLORIDE 150; 450 MG/100ML; MG/100ML
250 INJECTION, SOLUTION INTRAVENOUS CONTINUOUS PRN
Status: DISCONTINUED | OUTPATIENT
Start: 2020-05-16 | End: 2020-05-16

## 2020-05-16 RX ORDER — SODIUM CHLORIDE 0.9 % (FLUSH) 0.9 %
3 SYRINGE (ML) INJECTION EVERY 12 HOURS SCHEDULED
Status: DISCONTINUED | OUTPATIENT
Start: 2020-05-16 | End: 2020-05-18 | Stop reason: HOSPADM

## 2020-05-16 RX ORDER — SODIUM CHLORIDE 0.9 % (FLUSH) 0.9 %
10 SYRINGE (ML) INJECTION ONCE AS NEEDED
Status: DISCONTINUED | OUTPATIENT
Start: 2020-05-16 | End: 2020-05-18 | Stop reason: HOSPADM

## 2020-05-16 RX ORDER — DEXTROSE, SODIUM CHLORIDE, AND POTASSIUM CHLORIDE 5; .45; .3 G/100ML; G/100ML; G/100ML
150 INJECTION INTRAVENOUS CONTINUOUS PRN
Status: DISCONTINUED | OUTPATIENT
Start: 2020-05-16 | End: 2020-05-17

## 2020-05-16 RX ORDER — DEXTROSE MONOHYDRATE 25 G/50ML
12.5 INJECTION, SOLUTION INTRAVENOUS AS NEEDED
Status: DISCONTINUED | OUTPATIENT
Start: 2020-05-16 | End: 2020-05-17

## 2020-05-16 RX ORDER — DEXTROSE AND SODIUM CHLORIDE 5; .45 G/100ML; G/100ML
150 INJECTION, SOLUTION INTRAVENOUS CONTINUOUS PRN
Status: DISCONTINUED | OUTPATIENT
Start: 2020-05-16 | End: 2020-05-17

## 2020-05-16 RX ORDER — SODIUM CHLORIDE 9 MG/ML
10 INJECTION, SOLUTION INTRAVENOUS CONTINUOUS PRN
Status: DISCONTINUED | OUTPATIENT
Start: 2020-05-16 | End: 2020-05-18 | Stop reason: HOSPADM

## 2020-05-16 RX ORDER — PRAMIPEXOLE DIHYDROCHLORIDE 1 MG/1
1 TABLET ORAL NIGHTLY
Status: DISCONTINUED | OUTPATIENT
Start: 2020-05-16 | End: 2020-05-18 | Stop reason: HOSPADM

## 2020-05-16 RX ADMIN — INSULIN DETEMIR 10 UNITS: 100 INJECTION, SOLUTION SUBCUTANEOUS at 19:57

## 2020-05-16 RX ADMIN — ATORVASTATIN CALCIUM 10 MG: 10 TABLET, FILM COATED ORAL at 08:26

## 2020-05-16 RX ADMIN — SODIUM CHLORIDE 6 UNITS/HR: 9 INJECTION, SOLUTION INTRAVENOUS at 21:04

## 2020-05-16 RX ADMIN — FAMOTIDINE 20 MG: 20 TABLET, FILM COATED ORAL at 08:26

## 2020-05-16 RX ADMIN — GABAPENTIN 800 MG: 400 CAPSULE ORAL at 20:01

## 2020-05-16 RX ADMIN — INSULIN ASPART 5 UNITS: 100 INJECTION, SOLUTION INTRAVENOUS; SUBCUTANEOUS at 11:29

## 2020-05-16 RX ADMIN — ACETAMINOPHEN 650 MG: 325 TABLET, FILM COATED ORAL at 19:58

## 2020-05-16 RX ADMIN — SODIUM CHLORIDE 2000 ML: 9 INJECTION, SOLUTION INTRAVENOUS at 21:03

## 2020-05-16 RX ADMIN — PRAMIPEXOLE DIHYDROCHLORIDE 1 MG: 1 TABLET ORAL at 20:01

## 2020-05-16 RX ADMIN — BUSPIRONE HYDROCHLORIDE 10 MG: 10 TABLET ORAL at 08:26

## 2020-05-16 RX ADMIN — DEXTROSE MONOHYDRATE 50 ML/HR: 50 INJECTION, SOLUTION INTRAVENOUS at 05:20

## 2020-05-16 RX ADMIN — SODIUM CHLORIDE, PRESERVATIVE FREE 10 ML: 5 INJECTION INTRAVENOUS at 20:03

## 2020-05-16 RX ADMIN — GABAPENTIN 800 MG: 400 CAPSULE ORAL at 05:34

## 2020-05-16 RX ADMIN — CEFTRIAXONE SODIUM 2 G: 2 INJECTION, POWDER, FOR SOLUTION INTRAMUSCULAR; INTRAVENOUS at 08:48

## 2020-05-16 RX ADMIN — SODIUM CHLORIDE, PRESERVATIVE FREE 10 ML: 5 INJECTION INTRAVENOUS at 08:27

## 2020-05-16 RX ADMIN — BUSPIRONE HYDROCHLORIDE 10 MG: 10 TABLET ORAL at 20:01

## 2020-05-16 RX ADMIN — ENOXAPARIN SODIUM 40 MG: 40 INJECTION SUBCUTANEOUS at 20:11

## 2020-05-16 RX ADMIN — GABAPENTIN 800 MG: 400 CAPSULE ORAL at 13:04

## 2020-05-16 RX ADMIN — HUMAN INSULIN 15 UNITS: 100 INJECTION, SOLUTION SUBCUTANEOUS at 19:57

## 2020-05-16 RX ADMIN — NICOTINE 1 PATCH: 21 PATCH, EXTENDED RELEASE TRANSDERMAL at 08:26

## 2020-05-16 RX ADMIN — VENLAFAXINE HYDROCHLORIDE 75 MG: 75 CAPSULE, EXTENDED RELEASE ORAL at 08:27

## 2020-05-16 NOTE — PLAN OF CARE
Pt improving, less confused today, but still unsteady when ambulating.  PT/OT working with patient.  Will continue to monitor.

## 2020-05-16 NOTE — PLAN OF CARE
Problem: Patient Care Overview  Goal: Plan of Care Review  Outcome: Ongoing (interventions implemented as appropriate)  Flowsheets (Taken 5/16/2020 1006)  Progress: improving  Plan of Care Reviewed With: patient  Outcome Summary: Pt seen for dysphagia therapy this date. Pt stated she had no issues w/AM meal and consumed 100% of meal.  Pt was sitting upright, 90 degrees in bed and willing to consume PO trials.  Pt consumed mech soft solids (muffin) and regular solids (ren) w/no difficulty.  Pt consumed thin liquids via straw w/no overt s/s of aspiration.  SLP advanced diet to regular solids and informed dietary, nsg and updated informational board.  Pt in agreement w/diet advancement and f/u session if pt does not d/c before next session.

## 2020-05-16 NOTE — THERAPY TREATMENT NOTE
Acute Care - Physical Therapy Treatment Note  [facility]     Patient Name: Ingrid Ortiz  : 1970  MRN: [id number]  Today's Date: 2020  Onset of Illness/Injury or Date of Surgery: 20          Admit Date: 2020    Visit Dx:    ICD-10-CM ICD-9-CM   1. Diabetic ketoacidosis without coma associated with diabetes mellitus due to underlying condition (CMS/HCC) E08.10 249.11   2. Acute renal failure, unspecified acute renal failure type (CMS/HCC) N17.9 584.9   3. Hypothermia, initial encounter T68.XXXA 991.6   4. Oropharyngeal dysphagia R13.12 787.22   5. Impaired mobility and activities of daily living Z74.09 799.89   6. Impaired functional mobility, balance, gait, and endurance Z74.09 V49.89     Patient Active Problem List   Diagnosis   • Diabetic ketoacidosis without coma associated with diabetes mellitus due to underlying condition (CMS/HCC)   • DARRELL (acute kidney injury) (CMS/[facility])   • Metabolic acidosis   • HLD (hyperlipidemia)   • Tobacco abuse   • Hyponatremia   • Dehydration   • Acute metabolic encephalopathy   • Anxiety   • Chronic obstructive pulmonary disease (CMS/HCC)   • Diabetes mellitus (CMS/HCC)   • Gastroesophageal reflux disease   • Non compliance with medical treatment   • Hyponatremia   • Hypothermia   • Leukocytosis   • Sepsis (CMS/HCC)   • UTI (urinary tract infection)       Therapy Treatment    Rehabilitation Treatment Summary     Row Name 20 1416 20 1025 20 0943       Treatment Time/Intention    Discipline  physical therapy assistant  -TW  occupational therapy assistant  -KD  speech language pathologist  -CK    Document Type  therapy note (daily note)  -TW  therapy note (daily note)  -KD  --    Subjective Information  no complaints  -TW  no complaints  -KD  no complaints  -CK    Mode of Treatment  physical therapy;individual therapy  -TW  occupational therapy  -KD  speech-language pathology;individual therapy  -CK    Patient/Family Observations  Pt  supine in bed and agreeable to therapy.  -TW  Supine in bed  -KD2  No family present  -CK    Care Plan Review  --  --  evaluation/treatment results reviewed;care plan/treatment goals reviewed;risks/benefits reviewed;current/potential barriers reviewed;patient/other agree to care plan  -CK2    Therapy Frequency (OT Eval)  --  daily  -KD2  --    Patient Effort  good  -TW  good  -KD2  good  -CK2    Existing Precautions/Restrictions  fall  -TW  fall  -KD2  --    Equipment Issued to Patient  --  gait belt  -KD2  --    Recorded by [TW] Javy Sanchez, PTA 05/16/20 1453 [KD] Kimberly Jameson PIERRE/L 05/16/20 1319  [KD2] Kimberly Jameson PIERRE/L 05/16/20 1320 [CK] Alesha White, MS CCC-SLP 05/16/20 0958  [CK2] Alesha White, MS CCC-SLP 05/16/20 1010    Row Name 05/16/20 1416 05/16/20 1025          Vital Signs    Pre Systolic BP Rehab  --  131  -KD     Pre Treatment Diastolic BP  --  71  -KD     Pretreatment Heart Rate (beats/min)  104  -TW  90  -KD     Intratreatment Heart Rate (beats/min)  116  -TW  --     Posttreatment Heart Rate (beats/min)  100  -TW  97  -KD     Pre SpO2 (%)  97  -TW  99  -KD     O2 Delivery Pre Treatment  room air  -TW  room air  -KD     Intra SpO2 (%)  98  -TW  --     Post SpO2 (%)  97  -TW  100  -KD     O2 Delivery Post Treatment  --  room air  -KD     Pre Patient Position  Supine  -TW  Supine  -KD     Intra Patient Position  Standing  -TW  Standing  -KD     Post Patient Position  Supine  -TW  Sitting  -KD     Recorded by [TW] Javy Sanchez, PTA 05/16/20 1453 [KD] Kimberly Jameson PIERRE/L 05/16/20 1323     Row Name 05/16/20 1416 05/16/20 1025          Cognitive Assessment/Intervention- PT/OT    Affect/Mental Status (Cognitive)  WFL  -TW  WFL  -KD     Orientation Status (Cognition)  oriented x 4  -TW  oriented x 3  -KD     Follows Commands (Cognition)  WFL  -TW  follows one step commands  -KD     Cognitive Function (Cognitive)  WFL  -TW  WFL  -KD     Personal Safety Interventions   fall prevention program maintained;gait belt;nonskid shoes/slippers when out of bed  -TW  --     Recorded by [TW] Javy Sanchez, PTA 05/16/20 1453 [KD] Kimberly Jameson PIERRE/L 05/16/20 1323     Row Name 05/16/20 1416 05/16/20 1025          Bed Mobility Assessment/Treatment    Supine-Sit [place] (Bed Mobility)  independent  -TW  independent  -KD     Sit-Supine [place] (Bed Mobility)  independent  -TW  independent  -KD     Assistive Device (Bed Mobility)  --  other (see comments) No AD  -KD     Recorded by [TW] Javy Sanchez, PTA 05/16/20 1453 [KD] Kimberly Jameson PIERRE/L 05/16/20 1323     Row Name 05/16/20 1025             Functional Mobility    Functional Mobility- Ind. Level  supervision required  -KD      Functional Mobility- Device  other (see comments) No AD  -KD      Functional Mobility-Distance (Feet)  20  -KD      Recorded by [KD] Kimberly Jameson PIERRE/L 05/16/20 1323      Row Name 05/16/20 1025             Transfer Assessment/Treatment    Transfer Assessment/Treatment  sit-stand transfer;stand-sit transfer;shower transfer;toilet transfer  -KD      Recorded by [KD] Kimberly Jameson PIERRE/L 05/16/20 1323      Row Name 05/16/20 1416 05/16/20 1025          Sit-Stand Transfer    Sit-Stand [place] (Transfers)  independent  -TW  independent  -KD     Recorded by [TW] Javy Sanchez, PTA 05/16/20 1453 [KD] Kimberly Jameson PIERRE/L 05/16/20 1323     Row Name 05/16/20 1416 05/16/20 1025          Stand-Sit Transfer    Stand-Sit [place] (Transfers)  independent  -TW  independent  -KD     Recorded by [TW] Javy Sanchez, PTA 05/16/20 1453 [KD] Kimberly Jameson PIERRE/L 05/16/20 1323     Row Name 05/16/20 1025             Toilet Transfer    Type (Toilet Transfer)  sit-stand;stand-sit  -KD      [place] Level (Toilet Transfer)  independent  -KD      Assistive Device (Toilet Transfer)  commode;grab bars/safety frame  -KD      Recorded by [KD] Kimberly Jameson COTA/L 05/16/20 4993       Row Name 05/16/20 1025             Shower Transfer    [place] Level (Shower Transfer)  independent  -KD      Assistive Device (Shower Transfer)  shower chair;grab bars/tub rail  -KD      Recorded by [KD] Kimberly Jameson COTA/L 05/16/20 1323      Row Name 05/16/20 1416             Gait/Stairs Assessment/Training    Gait/Stairs Assessment/Training  gait/ambulation independence  -TW      [place] Level (Gait)  stand by assist  -TW      Assistive Device (Gait)  other (see comments) none  -TW      Distance in Feet (Gait)  334ft  -TW      Pattern (Gait)  step-through  -TW      Deviations/Abnormal Patterns (Gait)  base of support, wide  -TW      Recorded by [TW] Javy Sanchez PTA 05/16/20 1453      Row Name 05/16/20 1025             Bathing Assessment/Intervention    Bathing [place] Level  bathing skills;upper body;lower body;supervision  -KD      Assistive Devices (Bathing)  bath mitt;grab bars/tub rail  -KD2      Bathing Position  unsupported sitting;unsupported standing  -KD2      Recorded by [KD] Kimberly Jameson COTA/L 05/16/20 1323  [KD2] Kimberly Jameson PIERRE/L 05/16/20 1326      Row Name 05/16/20 1025             Upper Body Dressing Assessment/Training    Upper Body Dressing [place] Level  upper body dressing skills;doff;don;independent HG  -KD      Upper Body Dressing Position  unsupported standing  -KD      Recorded by [KD] Kimberly Jameson COTA/L 05/16/20 1326      Row Name 05/16/20 1025             Lower Body Dressing Assessment/Training    Lower Body Dressing [place] Level  lower body dressing skills;don;socks;undergarment;independent  -KD      Lower Body Dressing Position  edge of bed sitting  -KD      Recorded by [KD] Kimberly Jameson PIERRE/L 05/16/20 1326      Row Name 05/16/20 1025             Grooming Assessment/Training    [place] Level (Grooming)  grooming skills;hair care, combing/brushing;oral care regimen;independent  -KD      Grooming Position  unsupported  standing  -KD      Recorded by [KD] Kimberly Jameson PIERRE/L 05/16/20 1326      Row Name 05/16/20 1025             Toileting Assessment/Training    [place] Level (Toileting)  toileting skills;adjust/manage clothing;perform perineal hygiene;independent  -KD      Assistive Devices (Toileting)  commode;grab bar/safety frame  -KD      Toileting Position  unsupported sitting  -KD      Recorded by [KD] Kimberly Jameson PIERRE/L 05/16/20 1326      Row Name 05/16/20 1416 05/16/20 1025 05/16/20 0943       Positioning and Restraints    Pre-Treatment Position  in bed  -TW  in bed  -KD  in bed  -CK    Post Treatment Position  bed  -TW  bed  -KD  bed  -CK2    In Bed  supine;call light within reach;encouraged to call for assist;exit alarm on  -TW  sitting;call light within reach;encouraged to call for assist;exit alarm on  -KD  side lying left;call light within reach;encouraged to call for assist;exit alarm on  -CK2    Recorded by [TW] Javy Sanchez, ALFONZO 05/16/20 1453 [KD] Kimberly Jameson PIERRE/L 05/16/20 1326 [CK] Alesha White MS CCC-SLP 05/16/20 0958  [CK2] Alesha White, MS CCC-[facility] 05/16/20 1010    Row Name 05/16/20 1025 05/16/20 0943          Pain Scale: Numbers Pre/Post-Treatment    Pain Scale: Numbers, Pretreatment  0/10 - no pain  -KD  0/10 - no pain  -CK     Pain Scale: Numbers, Post-Treatment  0/10 - no pain  -KD  0/10 - no pain  -CK2     Recorded by [KD] Kimberly Jameson PIERRE/L 05/16/20 1326 [CK] Alesha White MS CCC-SLP 05/16/20 0958  [CK2] Alesha White, MS CCC-[facility] 05/16/20 1010     Row Name 05/16/20 1025             Outcome Summary/Treatment Plan (OT)    Daily Summary of Progress (OT)  progress toward functional goals as expected  -KD      Plan for Continued Treatment (OT)  Cont OT POC  -KD      Anticipated Discharge Disposition (OT)  anticipate therapy at next level of care  -KD      Recorded by [KD] Kimberly Jameson COTA/L 05/16/20 1326      Row Name 05/16/20 1416             Outcome  Summary/Treatment Plan (PT)    Daily Summary of Progress (PT)  progress toward functional goals is good  -TW      Plan for Continued Treatment (PT)  Cont with balance activity.  -TW      Anticipated Discharge Disposition (PT)  anticipate therapy at next level of care  -TW      Recorded by [TW] Javy Sanchez PTA 05/16/20 1453      Row Name 05/16/20 0943             Outcome Summary/Treatment Plan (SLP)    Daily Summary of Progress (SLP)  progress toward functional goals as expected  -CK      Plan for Continued Treatment (SLP)  Continue POC  -CK2      Anticipated Dischage Disposition  home  -CK2      Recorded by [CK] Alesha White, MS CCC-SLP 05/16/20 0958  [CK2] Alesha White, MS CCC-SLP 05/16/20 1010        User Key  (r) = Recorded By, (t) = Taken By, (c) = Cosigned By    Initials Name Effective Dates Discipline    CK Alesha White, MS CCC-SLP 02/11/20 -  SLP    TW Javy Sanchez PTA 03/07/18 -  PT    KD Kimberly Jameson COTA/L 03/07/18 -  OT               Rehab Goal Summary     Row Name 05/16/20 1416 05/16/20 1025 05/16/20 0943       Physical Therapy Goals    Gait Training Goal Selection (PT)  gait training, PT goal 1;gait training, PT goal (free text)  -TW  --  --    Stairs Goal Selection (PT)  stairs, PT goal 1  -TW  --  --       Gait Training Goal 1 (PT)    Activity/Assistive Device (Gait Training Goal 1, PT)  gait (walking locomotion)  -TW  --  --    [place] Level (Gait Training Goal 1, PT)  independent  -TW  --  --    Distance (Gait Goal 1, PT)  300 ft or more w;/ VSS  -TW  --  --    Time Frame (Gait Training Goal 1, PT)  by discharge  -TW  --  --    Barriers (Gait Training Goal 1, PT)  ex patricia  -TW  --  --    Progress/Outcome (Gait Training Goal 1, PT)  goal not met  -TW  --  --       Gait Training Goal (PT)    Gait Training Goal (PT)  successful completion of TUG and Tinetti for low fall risk score before determining indep gait ; VSS  -TW  --  --    Time Frame (Gait Training  Goal, PT)  by discharge  -TW  --  --    Barriers (Gait Training Goal, PT)  ex patricia  -TW  --  --    Progress/Outcome (Gait Training Goal, PT)  goal not met  -TW  --  --       Stairs Goal 1 (PT)    Activity/Assistive Device (Stairs Goal 1, PT)  ascending stairs;descending stairs  -TW  --  --    [place] Level/Cues Needed (Stairs Goal 1, PT)  independent  -TW  --  --    Number of Stairs (Stairs Goal 1, PT)  4 or more  -TW  --  --    Time Frame (Stairs Goal 1, PT)  by discharge  -TW  --  --    Barriers (Stairs Goal 1, PT)  weakness  -TW  --  --    Progress/Outcome (Stairs Goal 1, PT)  goal not met  -TW  --  --       Occupational Therapy Goals    Transfer Goal Selection (OT)  --  transfer, OT goal 1  -KD  --    Bathing Goal Selection (OT)  --  bathing, OT goal 1  -KD  --    Dressing Goal Selection (OT)  --  dressing, OT goal 1  -KD  --    Toileting Goal Selection (OT)  --  toileting, OT goal 1  -KD  --    Grooming Goal Selection (OT)  --  grooming, OT goal 1  -KD  --    Activity Tolerance Goal Selection (OT)  --  activity tolerance, OT goal 1  -KD  --       Transfer Goal 1 (OT)    Activity/Assistive Device (Transfer Goal 1, OT)  --  transfers, all  -KD  --    [place] Level/Cues Needed (Transfer Goal 1, OT)  --  conditional independence;verbal cues required;tactile cues required;set-up required  -KD  --    Time Frame (Transfer Goal 1, OT)  --  long term goal (LTG);by discharge  -KD  --    Progress/Outcome (Transfer Goal 1, OT)  --  goal not met  -KD  --       Bathing Goal 1 (OT)    Activity/Assistive Device (Bathing Goal 1, OT)  --  bathing skills, all  -KD  --    [place] Level/Cues Needed (Bathing Goal 1, OT)  --  standby assist;verbal cues required;tactile cues required;set-up required  -KD  --    Time Frame (Bathing Goal 1, OT)  --  long term goal (LTG);by discharge  -KD  --    Progress/Outcomes (Bathing Goal 1, OT)  --  goal met  -KD  --       Dressing Goal 1 (OT)    Activity/Assistive Device (Dressing  Goal 1, OT)  --  dressing skills, all  -KD  --    [place]/Cues Needed (Dressing Goal 1, OT)  --  standby assist;verbal cues required;tactile cues required;set-up required  -KD  --    Time Frame (Dressing Goal 1, OT)  --  long term goal (LTG);by discharge  -KD  --    Progress/Outcome (Dressing Goal 1, OT)  --  goal met  -KD  --       Toileting Goal 1 (OT)    Activity/Device (Toileting Goal 1, OT)  --  toileting skills, all  -KD  --    [place] Level/Cues Needed (Toileting Goal 1, OT)  --  standby assist;verbal cues required;tactile cues required;set-up required  -KD  --    Time Frame (Toileting Goal 1, OT)  --  long term goal (LTG);by discharge  -KD  --    Progress/Outcome (Toileting Goal 1, OT)  --  goal met  -KD  --       Grooming Goal 1 (OT)    Activity/Device (Grooming Goal 1, OT)  --  grooming skills, all  -KD  --    [place] (Grooming Goal 1, OT)  --  standby assist;verbal cues required;tactile cues required;set-up required  -KD  --    Time Frame (Grooming Goal 1, OT)  --  long term goal (LTG);by discharge  -KD  --    Progress/Outcome (Grooming Goal 1, OT)  --  goal met  -KD  --        Activity Tolerance Goal 1 (OT)    Activity Level (Endurance Goal 1, OT)  --  -- 15 min functional activity with 1-2 rest breaks  -KD  --    Time Frame (Activity Tolerance Goal 1, OT)  --  long term goal (LTG)  -KD  --    Progress/Outcome (Activity Tolerance Goal 1, OT)  --  goal met  -KD  --       [place] Goals (SLP)    Oral Nutrition/Hydration Goal Selection (SLP)  --  --  oral nutrition/hydration, SLP goal 1  -CK       Oral Nutrition/Hydration Goal 1 (SLP)    Oral Nutrition/Hydration Goal 1, SLP  --  --  pt to tolerate recommended diet for safe and adequate nutrition/hydratiion  -CK    Time Frame (Oral Nutrition/Hydration Goal 1, SLP)  --  --  by discharge  -CK    Barriers (Oral Nutrition/Hydration Goal 1, SLP)  --  --  cognition/ oral awareness  -CK    Progress/Outcomes (Oral Nutrition/Hydration Goal 1, SLP)  --   --  goal met;goal ongoing  -CK      User Key  (r) = Recorded By, (t) = Taken By, (c) = Cosigned By    Initials Name Provider Type Discipline    CK Alesha White, MS CCC-SLP Speech and Language Pathologist SLP    Javy Melton, PTA Physical Therapy Assistant PT    Kimberly Worley, PIERRE/L Occupational Therapy Assistant OT          Physical Therapy Education                 Title: PT OT SLP Therapies (In Progress)     Topic: Physical Therapy (In Progress)     Point: Mobility training (Done)     Description:   Instruct learner(s) on safety and technique for assisting patient out of bed, chair or wheelchair.  Instruct in the proper use of assistive devices, such as walker, crutches, cane or brace.              Patient Friendly Description:   It's important to get you on your feet again, but we need to do so in a way that is safe for you. Falling has serious consequences, and your personal safety is the most important thing of all.        When it's time to get out of bed, one of us or a family member will sit next to you on the bed to give you support.     If your doctor or nurse tells you to use a walker, crutches, a cane, or a brace, be sure you use it every time you get out of bed, even if you think you don't need it.    Learning Progress Summary           Patient Acceptance, E, VU,NR by BRIANDA at 5/15/2020 5145                   Point: Home exercise program (Not Started)     Description:   Instruct learner(s) on appropriate technique for monitoring, assisting and/or progressing patient with therapeutic exercises and activities.              Learner Progress:   Not documented in this visit.          Point: Body mechanics (Not Started)     Description:   Instruct learner(s) on proper positioning and spine alignment for patient and/or caregiver during mobility tasks and/or exercises.              Learner Progress:   Not documented in this visit.          Point: Precautions (Not Started)     Description:    Instruct learner(s) on prescribed precautions during mobility and gait tasks              Learner Progress:   Not documented in this visit.                      User Key     Initials Effective Dates Name Provider Type Discipline     04/03/18 -  Damari Jeffries, PT Physical Therapist PT                PT Recommendation and Plan  Anticipated Discharge Disposition (PT): anticipate therapy at next level of care  Outcome Summary/Treatment Plan (PT)  Daily Summary of Progress (PT): progress toward functional goals is good  Plan for Continued Treatment (PT): Cont with balance activity.  Anticipated Discharge Disposition (PT): anticipate therapy at next level of care  Plan of Care Reviewed With: patient  Progress: improving  Outcome Summary: Pt agreeable to therapy and shows good improvment with balance and control during t/f's and gait although still deviates at times. Pt amb with wide base this date for 334ft with no AD but requires SBA due to diviations. Pt would cont to progress with therapy upon DC.  Outcome Measures     Row Name 05/16/20 1416 05/16/20 1025 05/15/20 1600       How much help from another person do you currently need...    Turning from your back to your side while in flat bed without using bedrails?  4  -TW  --  4  -GB    Moving from lying on back to sitting on the side of a flat bed without bedrails?  4  -TW  --  3  -GB    Moving to and from a bed to a chair (including a wheelchair)?  3  -TW  --  3  -GB    Standing up from a chair using your arms (e.g., wheelchair, bedside chair)?  3  -TW  --  3  -GB    Climbing 3-5 steps with a railing?  3  -TW  --  2  -GB    To walk in hospital room?  3  -TW  --  3  -GB    AM-PAC 6 Clicks Score (PT)  20  -TW  --  18  -GB       How much help from another is currently needed...    Putting on and taking off regular lower body clothing?  --  3  -KD  --    Bathing (including washing, rinsing, and drying)  --  3  -KD  --    Toileting (which includes using  toilet bed pan or urinal)  --  3  -KD  --    Putting on and taking off regular upper body clothing  --  3  -KD  --    Taking care of personal grooming (such as brushing teeth)  --  3  -KD  --    Eating meals  --  3  -KD  --    AM-PAC 6 Clicks Score (OT)  --  18  -KD  --       Functional Assessment    Outcome Measure Options  --  --  AM-PAC 6 Clicks Basic Mobility (PT)  -    Row Name 05/15/20 1340             How much help from another is currently needed...    Putting on and taking off regular lower body clothing?  3  -JH      Bathing (including washing, rinsing, and drying)  3  -JH      Toileting (which includes using toilet bed pan or urinal)  3  -JH      Putting on and taking off regular upper body clothing  3  -JH      Taking care of personal grooming (such as brushing teeth)  3  -JH      Eating meals  3  -JH      AM-PAC 6 Clicks Score (OT)  18  -JH         Functional Assessment    Outcome Measure Options  AM-PAC 6 Clicks Daily Activity (OT)  -        User Key  (r) = Recorded By, (t) = Taken By, (c) = Cosigned By    Initials Name Provider Type    Damari Fernandez, PT Physical Therapist    Javy Melton, ALFONZO Physical Therapy Assistant    Kimberly Worley COTA/L Occupational Therapy Assistant     Jeremiah Lorenz, OT Occupational Therapist         Time Calculation:   PT Charges     Row Name 05/16/20 1456             Time Calculation    Start Time  1416  -TW      Stop Time  1444  -TW      Time Calculation (min)  28 min  -TW      PT Received On  05/16/20  -TW      PT Goal Re-Cert Due Date  05/24/20  -TW         Time Calculation- PT    Total Timed Code Minutes- PT  28 minute(s)  -TW        User Key  (r) = Recorded By, (t) = Taken By, (c) = Cosigned By    Initials Name Provider Type    Javy Melton PTA Physical Therapy Assistant        Therapy Charges for Today     Code Description Service Date Service Provider Modifiers Qty    75871396243 HC GAIT TRAINING EA 15 MIN 5/16/2020  Javy Sanchez, ALFONZO GP 1    45461741318 HC PT THERAPEUTIC ACT EA 15 MIN 5/16/2020 Javy Sanchez, ALFONZO GP 1          PT G-Codes  Outcome Measure Options: AM-PAC 6 Clicks Basic Mobility (PT)  AM-PAC 6 Clicks Score (PT): 20  AM-PAC 6 Clicks Score (OT): 18    Javy Sanchez PTA  5/16/2020

## 2020-05-16 NOTE — THERAPY TREATMENT NOTE
Acute Care - Occupational Therapy Treatment Note  [facility]     Patient Name: Ingrid Ortiz  : 1970  MRN: [id number]  Today's Date: 2020  Onset of Illness/Injury or Date of Surgery: 20  Date of Referral to OT: 05/15/20       Admit Date: 2020       ICD-10-CM ICD-9-CM   1. Diabetic ketoacidosis without coma associated with diabetes mellitus due to underlying condition (CMS/[facility]) E08.10 249.11   2. Acute renal failure, unspecified acute renal failure type (CMS/[facility]) N17.9 584.9   3. Hypothermia, initial encounter T68.XXXA 991.6   4. Oropharyngeal dysphagia R13.12 787.22   5. Impaired mobility and activities of daily living Z74.09 799.89   6. Impaired functional mobility, balance, gait, and endurance Z74.09 V49.89     Patient Active Problem List   Diagnosis   • Diabetic ketoacidosis without coma associated with diabetes mellitus due to underlying condition (CMS/[facility])   • DARRELL (acute kidney injury) (CMS/[facility])   • Metabolic acidosis   • HLD (hyperlipidemia)   • Tobacco abuse   • Hyponatremia   • Dehydration   • Acute metabolic encephalopathy   • Anxiety   • Chronic obstructive pulmonary disease (CMS/[facility])   • Diabetes mellitus (CMS/HCC)   • Gastroesophageal reflux disease   • Non compliance with medical treatment   • Hyponatremia   • Hypothermia   • Leukocytosis   • Sepsis (CMS/[facility])   • UTI (urinary tract infection)     Past Medical History:   Diagnosis Date   • Diabetes (CMS/HCC)    • GERD (gastroesophageal reflux disease)    • HLD (hyperlipidemia)      Past Surgical History:   Procedure Laterality Date   • CERVICAL DISC SURGERY     • CHOLECYSTECTOMY     • HYSTERECTOMY     • TUBAL ABDOMINAL LIGATION         Therapy Treatment    Rehabilitation Treatment Summary     Row Name 20 1025 20 0943          Treatment Time/Intention    Discipline  occupational therapy assistant  -KD  speech language pathologist  -CK     Document Type  therapy note (daily note)  -KD  --     Subjective  Information  no complaints  -KD  no complaints  -CK     Mode of Treatment  occupational therapy  -KD  speech-language pathology;individual therapy  -CK     Patient/Family Observations  Supine in bed  -KD2  No family present  -CK     Care Plan Review  --  evaluation/treatment results reviewed;care plan/treatment goals reviewed;risks/benefits reviewed;current/potential barriers reviewed;patient/other agree to care plan  -CK2     Therapy Frequency (OT Eval)  daily  -KD2  --     Patient Effort  good  -KD2  good  -CK2     Existing Precautions/Restrictions  fall  -KD2  --     Equipment Issued to Patient  gait belt  -KD2  --     Recorded by [KD] Kimberly Jameson PIERRE/L 05/16/20 1319  [KD2] Kimberly Jameson PIERRE/L 05/16/20 1320 [CK] Alesha White, MS CCC-SLP 05/16/20 0958  [CK2] Alesha White, MS CCC-SLP 05/16/20 1010     Row Name 05/16/20 1025             Vital Signs    Pre Systolic BP Rehab  131  -KD      Pre Treatment Diastolic BP  71  -KD      Pretreatment Heart Rate (beats/min)  90  -KD      Posttreatment Heart Rate (beats/min)  97  -KD      Pre SpO2 (%)  99  -KD      O2 Delivery Pre Treatment  room air  -KD      Post SpO2 (%)  100  -KD      O2 Delivery Post Treatment  room air  -KD      Pre Patient Position  Supine  -KD      Intra Patient Position  Standing  -KD      Post Patient Position  Sitting  -KD      Recorded by [KD] Kimberly Jameson PIERRE/L 05/16/20 1323      Row Name 05/16/20 1025             Cognitive Assessment/Intervention- PT/OT    Affect/Mental Status (Cognitive)  WFL  -KD      Orientation Status (Cognition)  oriented x 3  -KD      Follows Commands (Cognition)  follows one step commands  -KD      Cognitive Function (Cognitive)  WFL  -KD      Recorded by [KD] Kimberly Jameson PIERRE/L 05/16/20 1323      Row Name 05/16/20 1025             Bed Mobility Assessment/Treatment    Supine-Sit [place] (Bed Mobility)  independent  -KD      Sit-Supine [place] (Bed Mobility)  independent  -KD       Assistive Device (Bed Mobility)  other (see comments) No AD  -KD      Recorded by [KD] Kimberly Jameson PIERRE/L 05/16/20 1323      Row Name 05/16/20 1025             Functional Mobility    Functional Mobility- Ind. Level  supervision required  -KD      Functional Mobility- Device  other (see comments) No AD  -KD      Functional Mobility-Distance (Feet)  20  -KD      Recorded by [KD] Kimberly Jameson PIERRE/L 05/16/20 1323      Row Name 05/16/20 1025             Transfer Assessment/Treatment    Transfer Assessment/Treatment  sit-stand transfer;stand-sit transfer;shower transfer;toilet transfer  -KD      Recorded by [KD] Kimberly Jameson PIERRE/L 05/16/20 1323      Row Name 05/16/20 1025             Sit-Stand Transfer    Sit-Stand [place] (Transfers)  independent  -KD      Recorded by [KD] Kimberly Jameson PIERRE/L 05/16/20 1323      Row Name 05/16/20 1025             Stand-Sit Transfer    Stand-Sit [place] (Transfers)  independent  -KD      Recorded by [KD] Kimberly Jameson PIERRE/L 05/16/20 1323      Row Name 05/16/20 1025             Toilet Transfer    Type (Toilet Transfer)  sit-stand;stand-sit  -KD      [place] Level (Toilet Transfer)  independent  -KD      Assistive Device (Toilet Transfer)  commode;grab bars/safety frame  -KD      Recorded by [KD] Kimberly Jameson PIERRE/L 05/16/20 1323      Row Name 05/16/20 1025             Shower Transfer    [place] Level (Shower Transfer)  independent  -KD      Assistive Device (Shower Transfer)  shower chair;grab bars/tub rail  -KD      Recorded by [KD] Kimberly Jameson PIERRE/L 05/16/20 1323      Row Name 05/16/20 1025             Bathing Assessment/Intervention    Bathing [place] Level  bathing skills;upper body;lower body;supervision  -KD      Assistive Devices (Bathing)  bath mitt;grab bars/tub rail  -KD2      Bathing Position  unsupported sitting;unsupported standing  -KD2      Recorded by [KD] Kimberly Jameson PIERRE/L 05/16/20 1323  [KD2] Kimberly Jameson,  PIERRE/L 05/16/20 1326      Row Name 05/16/20 1025             Upper Body Dressing Assessment/Training    Upper Body Dressing [place] Level  upper body dressing skills;doff;don;independent HG  -KD      Upper Body Dressing Position  unsupported standing  -KD      Recorded by [KD] Kimberly Jameson COTA/L 05/16/20 1326      Row Name 05/16/20 1025             Lower Body Dressing Assessment/Training    Lower Body Dressing [place] Level  lower body dressing skills;don;socks;undergarment;independent  -KD      Lower Body Dressing Position  edge of bed sitting  -KD      Recorded by [KD] Kimberly Jameson COTA/L 05/16/20 1326      Row Name 05/16/20 1025             Grooming Assessment/Training    [place] Level (Grooming)  grooming skills;hair care, combing/brushing;oral care regimen;independent  -KD      Grooming Position  unsupported standing  -KD      Recorded by [KD] Kimberly Jameson COTA/L 05/16/20 1326      Row Name 05/16/20 1025             Toileting Assessment/Training    [place] Level (Toileting)  toileting skills;adjust/manage clothing;perform perineal hygiene;independent  -KD      Assistive Devices (Toileting)  commode;grab bar/safety frame  -KD      Toileting Position  unsupported sitting  -KD      Recorded by [KD] Kimberly Jameson COTA/L 05/16/20 1326      Row Name 05/16/20 1025 05/16/20 0943          Positioning and Restraints    Pre-Treatment Position  in bed  -KD  in bed  -CK     Post Treatment Position  bed  -KD  bed  -CK2     In Bed  sitting;call light within reach;encouraged to call for assist;exit alarm on  -KD  side lying left;call light within reach;encouraged to call for assist;exit alarm on  -CK2     Recorded by [KD] Kimberly Jameson COTA/L 05/16/20 1326 [CK] Alesha White MS CCC-SLP 05/16/20 0958  [CK2] Alesha White MS CCC-SLP 05/16/20 1010     Row Name 05/16/20 1025 05/16/20 0943          Pain Scale: Numbers Pre/Post-Treatment    Pain Scale: Numbers, Pretreatment  0/10 -  no pain  -KD  0/10 - no pain  -CK     Pain Scale: Numbers, Post-Treatment  0/10 - no pain  -KD  0/10 - no pain  -CK2     Recorded by [KD] Kimberly Jameson COTA/L 05/16/20 1326 [CK] Cindy Alesha CM, MS CCC-SLP 05/16/20 0958  [CK2] Alesha White, MS CCC-[facility] 05/16/20 1010     Row Name 05/16/20 1025             Outcome Summary/Treatment Plan (OT)    Daily Summary of Progress (OT)  progress toward functional goals as expected  -KD      Plan for Continued Treatment (OT)  Cont OT POC  -KD      Anticipated Discharge Disposition (OT)  anticipate therapy at next level of care  -KD      Recorded by [KD] Kimberly Jameson COTA/L 05/16/20 1326      Row Name 05/16/20 0943             Outcome Summary/Treatment Plan (SLP)    Daily Summary of Progress (SLP)  progress toward functional goals as expected  -CK      Plan for Continued Treatment (SLP)  Continue POC  -CK2      Anticipated Dischage Disposition  home  -CK2      Recorded by [CK] Alesha White, MS CCC-SLP 05/16/20 0958  [CK2] Cindy Alesha CM, MS CCC-SLP 05/16/20 1010        User Key  (r) = Recorded By, (t) = Taken By, (c) = Cosigned By    Initials Name Effective Dates Discipline    CK Alesha White, MS CCC-SLP 02/11/20 -  SLP    KD Kimberly Jameson PIERRE/L 03/07/18 -  OT           Rehab Goal Summary     Row Name 05/16/20 1025 05/16/20 0943          Occupational Therapy Goals    Transfer Goal Selection (OT)  transfer, OT goal 1  -KD  --     Bathing Goal Selection (OT)  bathing, OT goal 1  -KD  --     Dressing Goal Selection (OT)  dressing, OT goal 1  -KD  --     Toileting Goal Selection (OT)  toileting, OT goal 1  -KD  --     Grooming Goal Selection (OT)  grooming, OT goal 1  -KD  --     Activity Tolerance Goal Selection (OT)  activity tolerance, OT goal 1  -KD  --        Transfer Goal 1 (OT)    Activity/Assistive Device (Transfer Goal 1, OT)  transfers, all  -KD  --     [place] Level/Cues Needed (Transfer Goal 1, OT)  conditional independence;verbal  cues required;tactile cues required;set-up required  -KD  --     Time Frame (Transfer Goal 1, OT)  long term goal (LTG);by discharge  -KD  --     Progress/Outcome (Transfer Goal 1, OT)  goal not met  -KD  --        Bathing Goal 1 (OT)    Activity/Assistive Device (Bathing Goal 1, OT)  bathing skills, all  -KD  --     [place] Level/Cues Needed (Bathing Goal 1, OT)  standby assist;verbal cues required;tactile cues required;set-up required  -KD  --     Time Frame (Bathing Goal 1, OT)  long term goal (LTG);by discharge  -KD  --     Progress/Outcomes (Bathing Goal 1, OT)  goal met  -KD  --        Dressing Goal 1 (OT)    Activity/Assistive Device (Dressing Goal 1, OT)  dressing skills, all  -KD  --     [place]/Cues Needed (Dressing Goal 1, OT)  standby assist;verbal cues required;tactile cues required;set-up required  -KD  --     Time Frame (Dressing Goal 1, OT)  long term goal (LTG);by discharge  -KD  --     Progress/Outcome (Dressing Goal 1, OT)  goal met  -KD  --        Toileting Goal 1 (OT)    Activity/Device (Toileting Goal 1, OT)  toileting skills, all  -KD  --     [place] Level/Cues Needed (Toileting Goal 1, OT)  standby assist;verbal cues required;tactile cues required;set-up required  -KD  --     Time Frame (Toileting Goal 1, OT)  long term goal (LTG);by discharge  -KD  --     Progress/Outcome (Toileting Goal 1, OT)  goal met  -KD  --        Grooming Goal 1 (OT)    Activity/Device (Grooming Goal 1, OT)  grooming skills, all  -KD  --     [place] (Grooming Goal 1, OT)  standby assist;verbal cues required;tactile cues required;set-up required  -KD  --     Time Frame (Grooming Goal 1, OT)  long term goal (LTG);by discharge  -KD  --     Progress/Outcome (Grooming Goal 1, OT)  goal met  -KD  --         Activity Tolerance Goal 1 (OT)    Activity Level (Endurance Goal 1, OT)  -- 15 min functional activity with 1-2 rest breaks  -KD  --     Time Frame (Activity Tolerance Goal 1, OT)  long term goal  (LTG)  -KD  --     Progress/Outcome (Activity Tolerance Goal 1, OT)  goal met  -KD  --        [place] Goals (SLP)    Oral Nutrition/Hydration Goal Selection (SLP)  --  oral nutrition/hydration, SLP goal 1  -CK        Oral Nutrition/Hydration Goal 1 (SLP)    Oral Nutrition/Hydration Goal 1, SLP  --  pt to tolerate recommended diet for safe and adequate nutrition/hydratiion  -CK     Time Frame (Oral Nutrition/Hydration Goal 1, SLP)  --  by discharge  -CK     Barriers (Oral Nutrition/Hydration Goal 1, SLP)  --  cognition/ oral awareness  -CK     Progress/Outcomes (Oral Nutrition/Hydration Goal 1, SLP)  --  goal met;goal ongoing  -CK       User Key  (r) = Recorded By, (t) = Taken By, (c) = Cosigned By    Initials Name Provider Type Discipline    CK Alesha White, MS CCC-SLP Speech and Language Pathologist SLP    Kimberly Worley COTA/L Occupational Therapy Assistant OT        Occupational Therapy Education                 Title: PT OT SLP Therapies (In Progress)     Topic: Occupational Therapy (In Progress)     Point: ADL training (Not Started)     Description:   Instruct learner(s) on proper safety adaptation and remediation techniques during self care or transfers.   Instruct in proper use of assistive devices.              Learner Progress:   Not documented in this visit.          Point: Home exercise program (Not Started)     Description:   Instruct learner(s) on appropriate technique for monitoring, assisting and/or progressing therapeutic exercises/activities.              Learner Progress:   Not documented in this visit.          Point: Precautions (Done)     Description:   Instruct learner(s) on prescribed precautions during self-care and functional transfers.              Learning Progress Summary           Patient Acceptance, E, VU,NR by  at 5/15/2020 [id number]    Comment:  Pt educated on role of OT, d/c recs, and POC.                   Point: Body mechanics (Not Started)     Description:   Instruct  learner(s) on proper positioning and spine alignment during self-care, functional mobility activities and/or exercises.              Learner Progress:   Not documented in this visit.                      User Key     Initials Effective Dates Name Provider Type Discipline     09/10/19 -  Jeremiah Lorenz OT Occupational Therapist OT                OT Recommendation and Plan  Outcome Summary/Treatment Plan (OT)  Daily Summary of Progress (OT): progress toward functional goals as expected  Plan for Continued Treatment (OT): Cont OT POC  Anticipated Discharge Disposition (OT): anticipate therapy at next level of care  Therapy Frequency (OT Eval): daily  Daily Summary of Progress (OT): progress toward functional goals as expected  Plan of Care Review  Plan of Care Reviewed With: patient  Plan of Care Reviewed With: patient  Outcome Summary: Sup-sit-aup-Ind, sit-stand-sit-INd, amb ~20 ' SBA w/ no LOB or AD. Pt completed shower this AM. Shower/toilet t/f-Ind, UB/LB bathing/dressing-Ind, Pt met 5 of 6 goals this date. Cont oT POC.  Outcome Measures     Row Name 05/16/20 1025 05/15/20 1600 05/15/20 1340       How much help from another person do you currently need...    Turning from your back to your side while in flat bed without using bedrails?  --  4  -GB  --    Moving from lying on back to sitting on the side of a flat bed without bedrails?  --  3  -GB  --    Moving to and from a bed to a chair (including a wheelchair)?  --  3  -GB  --    Standing up from a chair using your arms (e.g., wheelchair, bedside chair)?  --  3  -GB  --    Climbing 3-5 steps with a railing?  --  2  -GB  --    To walk in hospital room?  --  3  -GB  --    AM-PAC 6 Clicks Score (PT)  --  18  -GB  --       How much help from another is currently needed...    Putting on and taking off regular lower body clothing?  3  -KD  --  3  -JH    Bathing (including washing, rinsing, and drying)  3  -KD  --  3  -JH    Toileting (which includes using toilet bed  pan or urinal)  3  -KD  --  3  -JH    Putting on and taking off regular upper body clothing  3  -KD  --  3  -JH    Taking care of personal grooming (such as brushing teeth)  3  -KD  --  3  -JH    Eating meals  3  -KD  --  3  -JH    AM-PAC 6 Clicks Score (OT)  18  -KD  --  18  -JH       Functional Assessment    Outcome Measure Options  --  AM-PAC 6 Clicks Basic Mobility (PT)  -  AM-PAC 6 Clicks Daily Activity (OT)  -      User Key  (r) = Recorded By, (t) = Taken By, (c) = Cosigned By    Initials Name Provider Type    GB Damari Jeffries, PT Physical Therapist    Kimberly Worley COTA/L Occupational Therapy Assistant     Jeremiah Lorenz, DONN Occupational Therapist           Time Calculation:   Time Calculation- OT     Row Name 05/16/20 1330             Time Calculation- OT    OT Start Time  1025  -KD      OT Stop Time  1103  -KD      OT Time Calculation (min)  38 min  -KD      Total Timed Code Minutes- OT  38 minute(s)  -KD      OT Received On  05/16/20  -        User Key  (r) = Recorded By, (t) = Taken By, (c) = Cosigned By    Initials Name Provider Type     Kimberly Jameson COTA/L Occupational Therapy Assistant        Therapy Charges for Today     Code Description Service Date Service Provider Modifiers Qty    [id number] HC OT SELF CARE/MGMT/TRAIN EA 15 MIN 5/16/2020 Kimbelry Jameson COTA/L GO 3               PATRICK Guerra  5/16/2020

## 2020-05-16 NOTE — PLAN OF CARE
Problem: Patient Care Overview  Goal: Plan of Care Review  Flowsheets (Taken 5/16/2020 1025)  Outcome Summary: Sup-sit-aup-Ind, sit-stand-sit-INd, amb ~20 ' SBA w/ no LOB or AD. Pt completed shower this AM. Shower/toilet t/f-Ind, UB/LB bathing/dressing-Ind, Pt met 5 of 6 goals this date. Cont oT POC.

## 2020-05-16 NOTE — PROGRESS NOTES
[facility] Medicine Services  INPATIENT PROGRESS NOTE    Length of Stay: 5  Date of Admission: 5/11/2020  Primary Care Physician: Provider, No Known    Subjective   Chief Complaint: No new changes.    HPI:    The patient is a 49-year-old white female with history of diabetes mellitus, GERD, hyperlipidemia, anxiety, tobacco abuse and noncompliance who was admitted with history of feeling extremely weak and with some confusion.  The patient was evaluated in the ER noted to have very elevated blood sugar of over thousand with diabetic ketoacidosis.  She appeared very dehydrated and her creatinine was elevated at 2.  White count was elevated at 25,000 and she was hypothermic on admission requiring Jarrod hugger to warm her up.  The patient was also hyponatremic with sodium of 114.  The patient was given some fluid boluses and some insulin and cultures were taken and she was started on broad-spectrum antibiotics in the ER.  Urinalysis showed changes suggesting possible UTI.    Patient is seen for follow-up today.  She is much improved, less deconditioned, tolerating oral intake and voices no new complaints.     Review of Systems   Constitutional: Positive for fatigue. Negative for activity change, appetite change, chills, diaphoresis and fever.   HENT: Negative for trouble swallowing and voice change.    Eyes: Negative for photophobia and visual disturbance.   Respiratory: Negative for cough, choking, chest tightness, shortness of breath, wheezing and stridor.    Cardiovascular: Negative for chest pain, palpitations and leg swelling.   Gastrointestinal: Negative for abdominal distention, abdominal pain, blood in stool, constipation, diarrhea, nausea and vomiting.   Endocrine: Negative for cold intolerance, heat intolerance, polydipsia, polyphagia and polyuria.   Genitourinary: Negative for decreased urine volume, difficulty urinating, dysuria, enuresis, flank pain, frequency,  hematuria and urgency.   Musculoskeletal: Negative for arthralgias, gait problem, myalgias, neck pain and neck stiffness.   Skin: Negative for pallor, rash and wound.   Neurological: Negative for dizziness, tremors, seizures, syncope, facial asymmetry, speech difficulty, weakness, light-headedness, numbness and headaches.   Hematological: Does not bruise/bleed easily.   Psychiatric/Behavioral: Negative for agitation, behavioral problems and confusion.       Objective    Temp:  [96.8 °F (36 °C)-98.7 °F (37.1 °C)] 97.6 °F (36.4 °C)  Heart Rate:  [] 100  Resp:  [17-18] 18  BP: (123-162)/(66-94) 123/66    Physical Exam   Constitutional: She is oriented to person, place, and time. She appears well-developed and well-nourished. She is cooperative. No distress.   HENT:   Head: Normocephalic and atraumatic.   Right Ear: External ear normal.   Left Ear: External ear normal.   Nose: Nose normal.   Mouth/Throat: Oropharynx is clear and moist.   Eyes: Conjunctivae are normal.   Neck: Neck supple. No JVD present. No thyromegaly present.   Cardiovascular: Normal rate, regular rhythm, normal heart sounds and intact distal pulses. Exam reveals no gallop and no friction rub.   No murmur heard.  Pulmonary/Chest: Effort normal and breath sounds normal. No stridor. No respiratory distress. She has no wheezes. She has no rales. She exhibits no tenderness.   Abdominal: Soft. Bowel sounds are normal. She exhibits no distension and no mass. There is no tenderness. There is no rebound and no guarding. No hernia.   Musculoskeletal: She exhibits no edema, tenderness or deformity.   Neurological: She is alert and oriented to person, place, and time. She has normal reflexes. No cranial nerve deficit or sensory deficit. She exhibits normal muscle tone. Coordination normal.       Skin: Skin is warm and dry. No rash noted. She is not diaphoretic. No erythema. No pallor.   Psychiatric: She has a normal mood and affect. Her behavior is normal.  Judgment and thought content normal.   Nursing note and vitals reviewed.        Medication Review:    Current Facility-Administered Medications:   •  acetaminophen (TYLENOL) tablet 650 mg, 650 mg, Oral, Q4H PRN, 650 mg at 05/15/20 2346 **OR** [DISCONTINUED] acetaminophen (TYLENOL) 160 MG/5ML solution 650 mg, 650 mg, Oral, Q4H PRN **OR** acetaminophen (TYLENOL) suppository 650 mg, 650 mg, Rectal, Q4H PRN, Misha Hart MD, 650 mg at 05/14/20 0422  •  atorvastatin (LIPITOR) tablet 10 mg, 10 mg, Oral, Daily, Misha Hart MD, 10 mg at 05/16/20 0826  •  busPIRone (BUSPAR) tablet 10 mg, 10 mg, Oral, Q12H, Misha Hart MD, 10 mg at 05/16/20 0826  •  cefTRIAXone (ROCEPHIN) 2 g/100 mL 0.9% NS VTB (LEONOR), 2 g, Intravenous, Q24H, Misha Hart MD, 2 g at 05/16/20 0848  •  dextrose (D50W) 25 g/ 50mL Intravenous Solution 25 g, 25 g, Intravenous, Q15 Min PRN, Misha Hart MD, 25 g at 05/14/20 1209  •  dextrose (D5W) 5 % infusion, 50 mL/hr, Intravenous, Continuous, Misha Hart MD, Last Rate: 50 mL/hr at 05/16/20 0520, 50 mL/hr at 05/16/20 0520  •  dextrose (GLUTOSE) oral gel 15 g, 15 g, Oral, Q15 Min PRN, Misha Hart MD  •  enoxaparin (LOVENOX) syringe 30 mg, 30 mg, Subcutaneous, Q24H, Misha Hart MD, 30 mg at 05/15/20 2121  •  famotidine (PEPCID) tablet 20 mg, 20 mg, Oral, Daily, Misha Hart MD, 20 mg at 05/16/20 0826  •  gabapentin (NEURONTIN) capsule 800 mg, 800 mg, Oral, Q8H, Misha Hart MD, 800 mg at 05/16/20 0534  •  glucagon (human recombinant) (GLUCAGEN DIAGNOSTIC) injection 1 mg, 1 mg, Subcutaneous, Q15 Min PRN, Misha Hart MD  •  insulin aspart (novoLOG) injection 0-7 Units, 0-7 Units, Subcutaneous, TID AC, Misha Hart MD, 5 Units at 05/16/20 1129  •  insulin detemir (LEVEMIR) injection 15 Units, 15 Units, Subcutaneous, Nightly, Misha Hart MD, 15 Units at 05/15/20 2121  •  ipratropium-albuterol (DUO-NEB) nebulizer solution 3  mL, 3 mL, Nebulization, Q6H PRN, Misha Hart MD  •  nicotine (NICODERM CQ) 21 MG/24HR patch 1 patch, 1 patch, Transdermal, Q24H, Misha Hart MD, 1 patch at 05/16/20 0826  •  ondansetron (ZOFRAN) tablet 4 mg, 4 mg, Oral, Q6H PRN **OR** ondansetron (ZOFRAN) injection 4 mg, 4 mg, Intravenous, Q6H PRN, Misha Hart MD, 4 mg at 05/13/20 0140  •  potassium phosphate 45 mmol in sodium chloride 0.9 % 250 mL infusion, 45 mmol, Intravenous, PRN **OR** potassium phosphate 30 mmol in sodium chloride 0.9 % 100 mL infusion, 30 mmol, Intravenous, PRN, Last Rate: 25 mL/hr at 05/14/20 0608, 30 mmol at 05/14/20 0608 **OR** potassium phosphate 15 mmol in sodium chloride 0.9 % 100 mL infusion, 15 mmol, Intravenous, PRN **OR** sodium phosphates 45 mmol in sodium chloride 0.9 % 250 mL IVPB, 45 mmol, Intravenous, PRN **OR** sodium phosphates 30 mmol in sodium chloride 0.9 % 100 mL IVPB, 30 mmol, Intravenous, PRN **OR** sodium phosphates 15 mmol in sodium chloride 0.9 % 100 mL IVPB, 15 mmol, Intravenous, PRN, Misha Hart MD, Last Rate: 50 mL/hr at 05/13/20 0548, 15 mmol at 05/13/20 0548  •  pramipexole (MIRAPEX) tablet 1 mg, 1 mg, Oral, Nightly, Misha Hart MD  •  sodium chloride 0.45 % 1,000 mL with potassium chloride 40 mEq infusion, 250 mL/hr, Intravenous, Continuous PRN, Misha Hart MD  •  sodium chloride 0.45 % infusion, 250 mL/hr, Intravenous, Continuous PRN, Misha Hart MD  •  sodium chloride 0.45 % with KCl 20 mEq/L infusion, 250 mL/hr, Intravenous, Continuous PRN, Misha Hart MD  •  [COMPLETED] Insert peripheral IV, , , Once **AND** sodium chloride 0.9 % flush 10 mL, 10 mL, Intravenous, PRN, Misha Hart MD  •  sodium chloride 0.9 % flush 10 mL, 10 mL, Intravenous, PRN, Misha Hart MD  •  sodium chloride 0.9 % flush 10 mL, 10 mL, Intravenous, Once PRN, Misha Hart MD  •  sodium chloride 0.9 % flush 10 mL, 10 mL, Intravenous, Q12H, Tanner,  Misha MATTHEWS MD, 10 mL at 05/16/20 0827  •  sodium chloride 0.9 % flush 10 mL, 10 mL, Intravenous, PRN, Misha Hart MD  •  sodium chloride 0.9 % flush 3 mL, 3 mL, Intravenous, Q12H, Misha Hart MD, 3 mL at 05/14/20 0904  •  sodium chloride 0.9 % flush 30 mL, 30 mL, Intravenous, Once PRN, Misha Hart MD  •  venlafaxine XR (EFFEXOR-XR) 24 hr capsule 75 mg, 75 mg, Oral, Daily, Misha Hart MD, 75 mg at 05/16/20 0827    Results Review:  I have reviewed the labs, radiology results, and diagnostic studies.    Laboratory Data:   Results from last 7 days   Lab Units 05/16/20  0812 05/15/20  0520 05/14/20  0435  05/12/20  0610  05/11/20  2238 05/11/20  1803   SODIUM mmol/L 138 150* 145   < > 134*   < > 112* 114*   POTASSIUM mmol/L 3.1* 3.6 3.8   < > 3.4*   < > 5.2 6.4*   CHLORIDE mmol/L 100 114* 116*   < > 104   < > 77* 76*   CO2 mmol/L 27.0 24.0 16.0*   < > 13.0*   < > 3.0* 3.0*   BUN mg/dL 14 20 20   < > 49*   < > 57* 49*   CREATININE mg/dL 1.07* 1.28* 1.57*   < > 1.86*   < > 2.35* 2.03*   GLUCOSE mg/dL 173* 295* 116*   < > 426*   < > 1,192* 1,124*   CALCIUM mg/dL 8.2* 8.6 8.3*   < > 7.4*   < > 8.3* 8.6   BILIRUBIN mg/dL  --   --   --   --  0.2  --  0.3 0.3   ALK PHOS U/L  --   --   --   --  227*  --  280* 268*   ALT (SGPT) U/L  --   --   --   --  41*  --  20 12   AST (SGOT) U/L  --   --   --   --  97*  --  43* 20   ANION GAP mmol/L 11.0 12.0 13.0   < > 17.0*   < > 32.0* 35.0*    < > = values in this interval not displayed.     Estimated Creatinine Clearance: 63.4 mL/min (A) (by C-G formula based on SCr of 1.07 mg/dL (H)).  Results from last 7 days   Lab Units 05/15/20  1210 05/15/20  0520 05/15/20  0115  05/14/20  0435  05/13/20  1014 05/13/20  0558   MAGNESIUM mg/dL  --   --   --   --  1.6  --  1.6 1.5*   PHOSPHORUS mg/dL 2.9 3.5 3.3   < > 1.7*   < > 1.9* 1.7*    < > = values in this interval not displayed.         Results from last 7 days   Lab Units 05/16/20  0812 05/14/20  0435  05/12/20  0610 05/11/20  2238 05/11/20  1803   WBC 10*3/mm3 11.04* 14.30* 5.05 19.62* 25.62*   HEMOGLOBIN g/dL 8.1* 12.5 9.7* 10.8* 10.9*   HEMATOCRIT % 24.0* 37.7 28.3* 37.4 38.9   PLATELETS 10*3/mm3 183 129* 194 288 336           Culture Data:   Blood Culture   Date Value Ref Range Status   05/15/2020 No growth at 24 hours  Preliminary     No results found for: URINECX  No results found for: RESPCX  No results found for: WOUNDCX  No results found for: STOOLCX  No components found for: BODYFLD    Radiology Data:   Imaging Results (Last 24 Hours)     ** No results found for the last 24 hours. **          I have reviewed the patient's current medications.     Assessment/Plan     Hospital Problem List:  Principal Problem:    Diabetic ketoacidosis without coma associated with diabetes mellitus due to underlying condition (CMS/formerly [facility])  Active Problems:    DARRELL (acute kidney injury) (CMS/formerly [facility])    Metabolic acidosis    HLD (hyperlipidemia)    Tobacco abuse    Hyponatremia    Dehydration    Acute metabolic encephalopathy    Anxiety    Gastroesophageal reflux disease    Hypothermia    Leukocytosis    Sepsis (CMS/formerly [facility])    UTI (urinary tract infection)    Diabetic ketoacidosis (complicated by acute metabolic encephalopathy): Resolved.  Continue basal insulin with Accu-Cheks and sliding scale insulin.    Hypernatremia: Resolved.      Normocytic anemia: This is likely dilutional as patient does not have any evidence of acute blood loss.  Discontinue IV hydration, monitor CBC and transfuse if the need arises.    Acute kidney injury (with metabolic acidosis): Resolved.  This is likely prerenal.  Last creatinine on record was 1.4 on 2/5/2018.  Creatinine is down to 1.07 from a high of 2.35 and metabolic acidosishas resolved.     Hypokalemia: Continue potassium repletion protocol.       Hypophosphatemia: Resolved.      Sepsis secondary to acute cystitis: Patient did screen positive for sepsis.  Continue antimicrobial therapy.  Blood and  urine cultures showed E. Coli.  Repeat blood culture showed no growth.      Nicotine dependence: Continue nicotine patch.    Continue GI and DVT prophylaxis.    Deconditioning: Continue PT and OT.    Discharge Planning: Likely discharge in lorraineDanika Hart MD   05/16/20   12:32

## 2020-05-16 NOTE — THERAPY TREATMENT NOTE
Acute Care - Speech Language Pathology   Swallow Treatment Note [facility]     Patient Name: Ingrid Ortiz  : 1970  MRN: [id number]  Today's Date: 2020  Onset of Illness/Injury or Date of Surgery: 20            Admit Date: 2020     Goal:  Patient will safely tolerate least restricted diet w/no overt s/s of aspiration for adequate nutrition and hydration:  Pt seen for dysphagia therapy this date. Pt stated she had no issues w/AM meal and consumed 100% of meal. Pt was sitting upright, 90 degrees in bed and willing to consume PO trials. Pt consumed mech soft solids (muffin) and regular solids (ren) w/no difficulty. Pt consumed thin liquids via straw w/no overt s/s of aspiration. SLP advanced diet to regular solids and informed dietary, nsg and updated informational board. Pt in agreement w/diet advancement and f/u session if pt does not d/c before next session.      Visit Dx:      ICD-10-CM ICD-9-CM   1. Diabetic ketoacidosis without coma associated with diabetes mellitus due to underlying condition (CMS/[facility]) E08.10 249.11   2. Acute renal failure, unspecified acute renal failure type (CMS/[facility]) N17.9 584.9   3. Hypothermia, initial encounter T68.XXXA 991.6   4. Oropharyngeal dysphagia R13.12 787.22   5. Impaired mobility and activities of daily living Z74.09 799.89   6. Impaired functional mobility, balance, gait, and endurance Z74.09 V49.89     Patient Active Problem List   Diagnosis   • Diabetic ketoacidosis without coma associated with diabetes mellitus due to underlying condition (CMS/[facility])   • DARRELL (acute kidney injury) (CMS/[facility])   • Metabolic acidosis   • HLD (hyperlipidemia)   • Tobacco abuse   • Hyponatremia   • Dehydration   • Acute metabolic encephalopathy   • Anxiety   • Chronic obstructive pulmonary disease (CMS/[facility])   • Diabetes mellitus (CMS/[facility])   • Gastroesophageal reflux disease   • Non compliance with medical treatment   • Hyponatremia   • Hypothermia   •  Leukocytosis   • Sepsis (CMS/HCC)   • UTI (urinary tract infection)       Therapy Treatment  Rehabilitation Treatment Summary     Row Name 05/16/20 0943             Treatment Time/Intention    Discipline  speech language pathologist  -CK      Subjective Information  no complaints  -CK      Mode of Treatment  speech-language pathology;individual therapy  -CK      Patient/Family Observations  No family present  -CK      Care Plan Review  evaluation/treatment results reviewed;care plan/treatment goals reviewed;risks/benefits reviewed;current/potential barriers reviewed;patient/other agree to care plan  -CK2      Patient Effort  good  -CK2      Recorded by [CK] Alesha White, MS CCC-SLP 05/16/20 0958  [CK2] Alesha White, MS CCC-SLP 05/16/20 1010      Row Name 05/16/20 0943             Positioning and Restraints    Pre-Treatment Position  in bed  -CK      Post Treatment Position  bed  -CK2      In Bed  side lying left;call light within reach;encouraged to call for assist;exit alarm on  -CK2      Recorded by [CK] Alesha White MS CCC-SLP 05/16/20 0958  [CK2] Alesha White, MS CCC-SLP 05/16/20 1010      Row Name 05/16/20 0943             Pain Scale: Numbers Pre/Post-Treatment    Pain Scale: Numbers, Pretreatment  0/10 - no pain  -CK      Pain Scale: Numbers, Post-Treatment  0/10 - no pain  -CK2      Recorded by [CK] Alesha White MS CCC-SLP 05/16/20 0958  [CK2] Alesha White, MS CCC-SLP 05/16/20 1010      Row Name 05/16/20 0943             Outcome Summary/Treatment Plan (SLP)    Daily Summary of Progress (SLP)  progress toward functional goals as expected  -CK      Plan for Continued Treatment (SLP)  Continue POC  -CK2      Anticipated Dischage Disposition  home  -CK2      Recorded by [CK] Alesha White MS CCC-SLP 05/16/20 0958  [CK2] Alesha White, MS CCC-SLP 05/16/20 1010        User Key  (r) = Recorded By, (t) = Taken By, (c) = Cosigned By    Initials Name Effective Dates  Discipline    CK Alesha White, MS CCC-SLP 02/11/20 -  SLP          Outcome Summary  Outcome Summary/Treatment Plan (SLP)  Daily Summary of Progress (SLP): progress toward functional goals as expected (05/16/20 0943 : Alesha White, MS CCC-SLP)  Plan for Continued Treatment (SLP): Continue POC (05/16/20 0943 : Alesha White, MS CCC-SLP)  Anticipated Dischage Disposition: home (05/16/20 0943 : Alesha White, MS [facility]-SLP)      SLP GOALS     Row Name 05/16/20 0943 05/15/20 0745 05/14/20 1133       Oral Nutrition/Hydration Goal 1 (SLP)    Oral Nutrition/Hydration Goal 1, SLP  pt to tolerate recommended diet for safe and adequate nutrition/hydratiion  -CK  pt to tolerate recommended diet for safe and adequate nutrition/hydratiion  -EC  pt to tolerate recommended diet for safe and adequate nutrition/hydratiion  -EC    Time Frame (Oral Nutrition/Hydration Goal 1, SLP)  by discharge  -CK  by discharge  -EC  by discharge  -EC    Barriers (Oral Nutrition/Hydration Goal 1, SLP)  cognition/ oral awareness  -CK  cognition/ oral awareness  -EC  cognition/ oral awareness  -EC    Progress/Outcomes (Oral Nutrition/Hydration Goal 1, SLP)  goal met;goal ongoing  -CK  goal partially met  -EC  goal ongoing  -EC      User Key  (r) = Recorded By, (t) = Taken By, (c) = Cosigned By    Initials Name Provider Type    EC Raquel Moran CCC-SLP Speech and Language Pathologist    Alesha Loco, MS CCC-SLP Speech and Language Pathologist          EDUCATION  The patient has been educated in the following areas:   Dysphagia (Swallowing Impairment).    SLP Recommendation and Plan  Daily Summary of Progress (SLP): progress toward functional goals as expected     Plan for Continued Treatment (SLP): Continue POC  Anticipated Dischage Disposition: home                    Time Calculation:   Time Calculation- SLP     Row Name 05/16/20 1010             Time Calculation- SLP    SLP Start Time  0943  -CK      SLP Stop Time   1010  -CK      SLP Time Calculation (min)  27 min  -CK      Total Timed Code Minutes- SLP  27 minute(s)  -CK      SLP Received On  05/16/20  -CK      SLP Goal Re-Cert Due Date  05/28/20  -CK        User Key  (r) = Recorded By, (t) = Taken By, (c) = Cosigned By    Initials Name Provider Type    Alesha Loco, MS CCC-SLP Speech and Language Pathologist          Therapy Charges for Today     Code Description Service Date Service Provider Modifiers Qty    [id number] HC ST TREATMENT SWALLOW 2 5/16/2020 Alesha White, MS CCC-SLP GN 1                 Alesha White MS CCC-SLP  5/16/2020

## 2020-05-16 NOTE — PLAN OF CARE
Problem: Patient Care Overview  Goal: Plan of Care Review  Outcome: Ongoing (interventions implemented as appropriate)  Flowsheets (Taken 5/16/2020 1416)  Progress: improving  Plan of Care Reviewed With: patient  Outcome Summary: Pt agreeable to therapy and shows good improvment with balance and control during t/f's and gait although still deviates at times. Pt amb with wide base this date for 334ft with no AD but requires SBA due to diviations. Pt would cont to progress with therapy upon DC.

## 2020-05-17 LAB
ANION GAP SERPL CALCULATED.3IONS-SCNC: 10 MMOL/L (ref 5–15)
ANION GAP SERPL CALCULATED.3IONS-SCNC: 12 MMOL/L (ref 5–15)
BUN BLD-MCNC: 22 MG/DL (ref 6–20)
BUN BLD-MCNC: 25 MG/DL (ref 6–20)
BUN/CREAT SERPL: 18.9 (ref 7–25)
BUN/CREAT SERPL: 19 (ref 7–25)
CALCIUM SPEC-SCNC: 7.9 MG/DL (ref 8.6–10.5)
CALCIUM SPEC-SCNC: 8.2 MG/DL (ref 8.6–10.5)
CHLORIDE SERPL-SCNC: 102 MMOL/L (ref 98–107)
CHLORIDE SERPL-SCNC: 106 MMOL/L (ref 98–107)
CO2 SERPL-SCNC: 24 MMOL/L (ref 22–29)
CO2 SERPL-SCNC: 25 MMOL/L (ref 22–29)
CREAT BLD-MCNC: 1.16 MG/DL (ref 0.57–1)
CREAT BLD-MCNC: 1.32 MG/DL (ref 0.57–1)
GFR SERPL CREATININE-BSD FRML MDRD: 43 ML/MIN/1.73
GFR SERPL CREATININE-BSD FRML MDRD: 49 ML/MIN/1.73
GLUCOSE BLD-MCNC: 118 MG/DL (ref 65–99)
GLUCOSE BLD-MCNC: 311 MG/DL (ref 65–99)
GLUCOSE BLDC GLUCOMTR-MCNC: 100 MG/DL (ref 70–130)
GLUCOSE BLDC GLUCOMTR-MCNC: 112 MG/DL (ref 70–130)
GLUCOSE BLDC GLUCOMTR-MCNC: 122 MG/DL (ref 70–130)
GLUCOSE BLDC GLUCOMTR-MCNC: 128 MG/DL (ref 70–130)
GLUCOSE BLDC GLUCOMTR-MCNC: 158 MG/DL (ref 70–130)
GLUCOSE BLDC GLUCOMTR-MCNC: 165 MG/DL (ref 70–130)
GLUCOSE BLDC GLUCOMTR-MCNC: 181 MG/DL (ref 70–130)
GLUCOSE BLDC GLUCOMTR-MCNC: 199 MG/DL (ref 70–130)
GLUCOSE BLDC GLUCOMTR-MCNC: 213 MG/DL (ref 70–130)
GLUCOSE BLDC GLUCOMTR-MCNC: 313 MG/DL (ref 70–130)
GLUCOSE BLDC GLUCOMTR-MCNC: 522 MG/DL (ref 70–130)
MAGNESIUM SERPL-MCNC: 1.4 MG/DL (ref 1.6–2.6)
MAGNESIUM SERPL-MCNC: 1.4 MG/DL (ref 1.6–2.6)
MAGNESIUM SERPL-MCNC: 1.5 MG/DL (ref 1.6–2.6)
MAGNESIUM SERPL-MCNC: 1.5 MG/DL (ref 1.6–2.6)
MAGNESIUM SERPL-MCNC: 1.6 MG/DL (ref 1.6–2.6)
PHOSPHATE SERPL-MCNC: 2.4 MG/DL (ref 2.5–4.5)
PHOSPHATE SERPL-MCNC: 2.8 MG/DL (ref 2.5–4.5)
PHOSPHATE SERPL-MCNC: 2.9 MG/DL (ref 2.5–4.5)
PHOSPHATE SERPL-MCNC: 3 MG/DL (ref 2.5–4.5)
PHOSPHATE SERPL-MCNC: 3.4 MG/DL (ref 2.5–4.5)
POTASSIUM BLD-SCNC: 3.1 MMOL/L (ref 3.5–5.2)
POTASSIUM BLD-SCNC: 3.5 MMOL/L (ref 3.5–5.2)
SODIUM BLD-SCNC: 138 MMOL/L (ref 136–145)
SODIUM BLD-SCNC: 141 MMOL/L (ref 136–145)
WHOLE BLOOD HOLD SPECIMEN: NORMAL

## 2020-05-17 PROCEDURE — 80048 BASIC METABOLIC PNL TOTAL CA: CPT | Performed by: INTERNAL MEDICINE

## 2020-05-17 PROCEDURE — 84100 ASSAY OF PHOSPHORUS: CPT | Performed by: INTERNAL MEDICINE

## 2020-05-17 PROCEDURE — 25010000002 CEFTRIAXONE PER 250 MG: Performed by: INTERNAL MEDICINE

## 2020-05-17 PROCEDURE — 25010000002 ENOXAPARIN PER 10 MG: Performed by: INTERNAL MEDICINE

## 2020-05-17 PROCEDURE — 83735 ASSAY OF MAGNESIUM: CPT | Performed by: INTERNAL MEDICINE

## 2020-05-17 PROCEDURE — 82962 GLUCOSE BLOOD TEST: CPT

## 2020-05-17 PROCEDURE — 63710000001 INSULIN DETEMIR PER 5 UNITS: Performed by: INTERNAL MEDICINE

## 2020-05-17 PROCEDURE — 97530 THERAPEUTIC ACTIVITIES: CPT

## 2020-05-17 PROCEDURE — 63710000001 INSULIN ASPART PER 5 UNITS: Performed by: INTERNAL MEDICINE

## 2020-05-17 PROCEDURE — 97116 GAIT TRAINING THERAPY: CPT

## 2020-05-17 PROCEDURE — 25010000002 MAGNESIUM SULFATE IN D5W 1G/100ML (PREMIX) 1-5 GM/100ML-% SOLUTION: Performed by: INTERNAL MEDICINE

## 2020-05-17 RX ORDER — MAGNESIUM SULFATE 1 G/100ML
1 INJECTION INTRAVENOUS ONCE
Status: COMPLETED | OUTPATIENT
Start: 2020-05-17 | End: 2020-05-17

## 2020-05-17 RX ORDER — DEXTROSE MONOHYDRATE 25 G/50ML
25 INJECTION, SOLUTION INTRAVENOUS
Status: DISCONTINUED | OUTPATIENT
Start: 2020-05-17 | End: 2020-05-18 | Stop reason: HOSPADM

## 2020-05-17 RX ORDER — NICOTINE POLACRILEX 4 MG
15 LOZENGE BUCCAL
Status: DISCONTINUED | OUTPATIENT
Start: 2020-05-17 | End: 2020-05-18 | Stop reason: HOSPADM

## 2020-05-17 RX ADMIN — VENLAFAXINE HYDROCHLORIDE 75 MG: 75 CAPSULE, EXTENDED RELEASE ORAL at 09:58

## 2020-05-17 RX ADMIN — POTASSIUM PHOSPHATE, MONOBASIC AND POTASSIUM PHOSPHATE, DIBASIC 15 MMOL: 224; 236 INJECTION, SOLUTION, CONCENTRATE INTRAVENOUS at 18:20

## 2020-05-17 RX ADMIN — POTASSIUM CHLORIDE, DEXTROSE MONOHYDRATE AND SODIUM CHLORIDE 150 ML/HR: 300; 5; 450 INJECTION, SOLUTION INTRAVENOUS at 02:29

## 2020-05-17 RX ADMIN — CEFTRIAXONE SODIUM 2 G: 2 INJECTION, POWDER, FOR SOLUTION INTRAMUSCULAR; INTRAVENOUS at 10:58

## 2020-05-17 RX ADMIN — INSULIN ASPART 2 UNITS: 100 INJECTION, SOLUTION INTRAVENOUS; SUBCUTANEOUS at 17:19

## 2020-05-17 RX ADMIN — INSULIN ASPART 7 UNITS: 100 INJECTION, SOLUTION INTRAVENOUS; SUBCUTANEOUS at 12:11

## 2020-05-17 RX ADMIN — MAGNESIUM SULFATE HEPTAHYDRATE 1 G: 1 INJECTION, SOLUTION INTRAVENOUS at 11:14

## 2020-05-17 RX ADMIN — SODIUM CHLORIDE 75 ML/HR: 9 INJECTION, SOLUTION INTRAVENOUS at 20:33

## 2020-05-17 RX ADMIN — GABAPENTIN 800 MG: 400 CAPSULE ORAL at 05:09

## 2020-05-17 RX ADMIN — ATORVASTATIN CALCIUM 10 MG: 10 TABLET, FILM COATED ORAL at 09:58

## 2020-05-17 RX ADMIN — SODIUM CHLORIDE, PRESERVATIVE FREE 10 ML: 5 INJECTION INTRAVENOUS at 09:59

## 2020-05-17 RX ADMIN — BUSPIRONE HYDROCHLORIDE 10 MG: 10 TABLET ORAL at 20:15

## 2020-05-17 RX ADMIN — ENOXAPARIN SODIUM 40 MG: 40 INJECTION SUBCUTANEOUS at 20:16

## 2020-05-17 RX ADMIN — SODIUM CHLORIDE, PRESERVATIVE FREE 3 ML: 5 INJECTION INTRAVENOUS at 20:35

## 2020-05-17 RX ADMIN — NICOTINE 1 PATCH: 21 PATCH, EXTENDED RELEASE TRANSDERMAL at 09:58

## 2020-05-17 RX ADMIN — GABAPENTIN 800 MG: 400 CAPSULE ORAL at 22:39

## 2020-05-17 RX ADMIN — SODIUM CHLORIDE, PRESERVATIVE FREE 10 ML: 5 INJECTION INTRAVENOUS at 20:33

## 2020-05-17 RX ADMIN — INSULIN DETEMIR 25 UNITS: 100 INJECTION, SOLUTION SUBCUTANEOUS at 20:16

## 2020-05-17 RX ADMIN — GABAPENTIN 800 MG: 400 CAPSULE ORAL at 13:09

## 2020-05-17 RX ADMIN — INSULIN DETEMIR 10 UNITS: 100 INJECTION, SOLUTION SUBCUTANEOUS at 10:02

## 2020-05-17 RX ADMIN — FAMOTIDINE 20 MG: 20 TABLET, FILM COATED ORAL at 09:58

## 2020-05-17 RX ADMIN — PRAMIPEXOLE DIHYDROCHLORIDE 1 MG: 1 TABLET ORAL at 20:15

## 2020-05-17 RX ADMIN — SODIUM CHLORIDE, PRESERVATIVE FREE 3 ML: 5 INJECTION INTRAVENOUS at 20:36

## 2020-05-17 RX ADMIN — DOCUSATE SODIUM 100 MG: 100 CAPSULE, LIQUID FILLED ORAL at 09:58

## 2020-05-17 RX ADMIN — SODIUM CHLORIDE 75 ML/HR: 9 INJECTION, SOLUTION INTRAVENOUS at 05:09

## 2020-05-17 NOTE — PROGRESS NOTES
[facility] Medicine Services  INPATIENT PROGRESS NOTE    Length of Stay: 6  Date of Admission: 5/11/2020  Primary Care Physician: Provider, No Known    Subjective   Chief Complaint: No new changes.    HPI:    The patient is a 49-year-old white female with history of diabetes mellitus, GERD, hyperlipidemia, anxiety, tobacco abuse and noncompliance who was admitted with history of feeling extremely weak and with some confusion.  The patient was evaluated in the ER noted to have very elevated blood sugar of over thousand with diabetic ketoacidosis.  She appeared very dehydrated and her creatinine was elevated at 2.  White count was elevated at 25,000 and she was hypothermic on admission requiring Jarrod hugger to warm her up.  The patient was also hyponatremic with sodium of 114.  The patient was given some fluid boluses and some insulin and cultures were taken and she was started on broad-spectrum antibiotics in the ER.  Urinalysis showed changes suggesting possible UTI.    Patient is seen for follow-up today.  She was transferred back to CCU last night secondary to another bout of DKA which has since resolved.  She is much improved, less deconditioned, tolerating oral intake and voices no new complaints.     Review of Systems   Constitutional: Positive for fatigue. Negative for activity change, appetite change, chills, diaphoresis and fever.   HENT: Negative for trouble swallowing and voice change.    Eyes: Negative for photophobia and visual disturbance.   Respiratory: Negative for cough, choking, chest tightness, shortness of breath, wheezing and stridor.    Cardiovascular: Negative for chest pain, palpitations and leg swelling.   Gastrointestinal: Negative for abdominal distention, abdominal pain, blood in stool, constipation, diarrhea, nausea and vomiting.   Endocrine: Negative for cold intolerance, heat intolerance, polydipsia, polyphagia and polyuria.   Genitourinary:  Negative for decreased urine volume, difficulty urinating, dysuria, enuresis, flank pain, frequency, hematuria and urgency.   Musculoskeletal: Negative for arthralgias, gait problem, myalgias, neck pain and neck stiffness.   Skin: Negative for pallor, rash and wound.   Neurological: Negative for dizziness, tremors, seizures, syncope, facial asymmetry, speech difficulty, weakness, light-headedness, numbness and headaches.   Hematological: Does not bruise/bleed easily.   Psychiatric/Behavioral: Negative for agitation, behavioral problems and confusion.       Objective    Temp:  [96.2 °F (35.7 °C)-98.7 °F (37.1 °C)] 98.7 °F (37.1 °C)  Heart Rate:  [] 90  Resp:  [18] 18  BP: ()/(57-87) 132/74    Physical Exam   Constitutional: She is oriented to person, place, and time. She appears well-developed and well-nourished. She is cooperative. No distress.   HENT:   Head: Normocephalic and atraumatic.   Right Ear: External ear normal.   Left Ear: External ear normal.   Nose: Nose normal.   Mouth/Throat: Oropharynx is clear and moist.   Eyes: Conjunctivae are normal.   Neck: Neck supple. No JVD present. No thyromegaly present.   Cardiovascular: Normal rate, regular rhythm, normal heart sounds and intact distal pulses. Exam reveals no gallop and no friction rub.   No murmur heard.  Pulmonary/Chest: Effort normal and breath sounds normal. No stridor. No respiratory distress. She has no wheezes. She has no rales. She exhibits no tenderness.   Abdominal: Soft. Bowel sounds are normal. She exhibits no distension and no mass. There is no tenderness. There is no rebound and no guarding. No hernia.   Musculoskeletal: She exhibits no edema, tenderness or deformity.   Neurological: She is alert and oriented to person, place, and time. She has normal reflexes. No cranial nerve deficit or sensory deficit. She exhibits normal muscle tone. Coordination normal.       Skin: Skin is warm and dry. No rash noted. She is not  diaphoretic. No erythema. No pallor.   Psychiatric: She has a normal mood and affect. Her behavior is normal. Judgment and thought content normal.   Nursing note and vitals reviewed.        Medication Review:    Current Facility-Administered Medications:   •  acetaminophen (TYLENOL) tablet 650 mg, 650 mg, Oral, Q4H PRN, 650 mg at 05/16/20 1958 **OR** [DISCONTINUED] acetaminophen (TYLENOL) 160 MG/5ML solution 650 mg, 650 mg, Oral, Q4H PRN **OR** acetaminophen (TYLENOL) suppository 650 mg, 650 mg, Rectal, Q4H PRN, Misha Hart MD, 650 mg at 05/14/20 0422  •  atorvastatin (LIPITOR) tablet 10 mg, 10 mg, Oral, Daily, Misha Hart MD, 10 mg at 05/17/20 0958  •  busPIRone (BUSPAR) tablet 10 mg, 10 mg, Oral, Q12H, Misha Hart MD, 10 mg at 05/16/20 2001  •  cefTRIAXone (ROCEPHIN) 2 g/100 mL 0.9% NS VTB (LEONOR), 2 g, Intravenous, Q24H, Misha Hart MD, 2 g at 05/16/20 0848  •  dextrose (D50W) 25 g/ 50mL Intravenous Solution 25 g, 25 g, Intravenous, Q15 Min PRN, Misha Hart MD  •  dextrose (GLUTOSE) oral gel 15 g, 15 g, Oral, Q15 Min PRN, Misha Hart MD  •  docusate sodium (COLACE) capsule 100 mg, 100 mg, Oral, Daily, Misha Hart MD, 100 mg at 05/17/20 0958  •  enoxaparin (LOVENOX) syringe 40 mg, 40 mg, Subcutaneous, Q24H, Misha Hart MD, 40 mg at 05/16/20 2011  •  famotidine (PEPCID) tablet 20 mg, 20 mg, Oral, Daily, Misha Hart MD, 20 mg at 05/17/20 0958  •  gabapentin (NEURONTIN) capsule 800 mg, 800 mg, Oral, Q8H, Misha Hart MD, 800 mg at 05/17/20 0509  •  glucagon (human recombinant) (GLUCAGEN DIAGNOSTIC) injection 1 mg, 1 mg, Subcutaneous, Q15 Min PRN, Misha Hart MD  •  hydrALAZINE (APRESOLINE) injection 10 mg, 10 mg, Intravenous, Q6H PRN, Misha Hart MD  •  insulin aspart (novoLOG) injection 0-9 Units, 0-9 Units, Subcutaneous, TID AC, Misha Hart MD  •  insulin detemir (LEVEMIR) injection 25 Units, 25 Units, Subcutaneous,  Nightly, Misha Hart MD  •  ipratropium-albuterol (DUO-NEB) nebulizer solution 3 mL, 3 mL, Nebulization, Q6H PRN, Misha Hart MD  •  nicotine (NICODERM CQ) 21 MG/24HR patch 1 patch, 1 patch, Transdermal, Q24H, Misha Hart MD, 1 patch at 05/17/20 0958  •  ondansetron (ZOFRAN) tablet 4 mg, 4 mg, Oral, Q6H PRN **OR** ondansetron (ZOFRAN) injection 4 mg, 4 mg, Intravenous, Q6H PRN, Misha Hart MD, 4 mg at 05/13/20 0140  •  potassium phosphate 45 mmol in sodium chloride 0.9 % 250 mL infusion, 45 mmol, Intravenous, PRN **OR** potassium phosphate 30 mmol in sodium chloride 0.9 % 100 mL infusion, 30 mmol, Intravenous, PRN, Last Rate: 25 mL/hr at 05/14/20 0608, 30 mmol at 05/14/20 0608 **OR** potassium phosphate 15 mmol in sodium chloride 0.9 % 100 mL infusion, 15 mmol, Intravenous, PRN **OR** sodium phosphates 45 mmol in sodium chloride 0.9 % 250 mL IVPB, 45 mmol, Intravenous, PRN **OR** sodium phosphates 30 mmol in sodium chloride 0.9 % 100 mL IVPB, 30 mmol, Intravenous, PRN **OR** sodium phosphates 15 mmol in sodium chloride 0.9 % 100 mL IVPB, 15 mmol, Intravenous, PRN, Misha Hart MD, Last Rate: 50 mL/hr at 05/13/20 0548, 15 mmol at 05/13/20 0548  •  pramipexole (MIRAPEX) tablet 1 mg, 1 mg, Oral, Nightly, Misha Hart MD, 1 mg at 05/16/20 2001  •  sodium chloride 0.45 % 1,000 mL with potassium chloride 40 mEq infusion, 250 mL/hr, Intravenous, Continuous PRN, Misha Hart MD  •  sodium chloride 0.45 % infusion, 250 mL/hr, Intravenous, Continuous PRN, Misha Hart MD  •  sodium chloride 0.45 % with KCl 20 mEq/L infusion, 250 mL/hr, Intravenous, Continuous PRNTanner Anthony W, MD  •  [COMPLETED] Insert peripheral IV, , , Once **AND** sodium chloride 0.9 % flush 10 mL, 10 mL, Intravenous, PRN, Misha Hart MD  •  sodium chloride 0.9 % flush 10 mL, 10 mL, Intravenous, PRN, Misha Hart MD  •  sodium chloride 0.9 % flush 10 mL, 10 mL, Intravenous,  Once PRN, Misha Hart MD  •  sodium chloride 0.9 % flush 10 mL, 10 mL, Intravenous, Q12H, Misha Hart MD, 10 mL at 05/17/20 0959  •  sodium chloride 0.9 % flush 10 mL, 10 mL, Intravenous, PRN, Misha Hart MD  •  sodium chloride 0.9 % flush 10 mL, 10 mL, Intravenous, Once PRN, Misha Hart MD  •  sodium chloride 0.9 % flush 3 mL, 3 mL, Intravenous, Q12H, Misha Hart MD, 3 mL at 05/14/20 0904  •  sodium chloride 0.9 % flush 3 mL, 3 mL, Intravenous, Q12H, Misha Hart MD  •  sodium chloride 0.9 % flush 30 mL, 30 mL, Intravenous, Once PRN, Misha Hart MD  •  sodium chloride 0.9 % infusion, 75 mL/hr, Intravenous, Continuous, Misha Hart MD, Last Rate: 75 mL/hr at 05/17/20 0509, 75 mL/hr at 05/17/20 0509  •  sodium chloride 0.9 % infusion, 250 mL/hr, Intravenous, Continuous PRNTanner Anthony W, MD  •  sodium chloride 0.9 % infusion, 10 mL/hr, Intravenous, Continuous PRN, Misha Hart MD  •  sodium chloride 0.9 % with KCl 20 mEq/L infusion, 250 mL/hr, Intravenous, Continuous PRN, Misha Hart MD  •  sodium chloride 0.9 % with KCl 40 mEq/L infusion, 250 mL/hr, Intravenous, Continuous PRN, Misha Hart MD  •  venlafaxine XR (EFFEXOR-XR) 24 hr capsule 75 mg, 75 mg, Oral, Daily, Misha Hart MD, 75 mg at 05/17/20 0958    Results Review:  I have reviewed the labs, radiology results, and diagnostic studies.    Laboratory Data:   Results from last 7 days   Lab Units 05/17/20  0344 05/16/20  2357 05/16/20  2215 05/16/20  2028  05/12/20  0610  05/11/20  2238   SODIUM mmol/L 141 138 136 131*   < > 134*   < > 112*   POTASSIUM mmol/L 3.5 3.1* 3.4* 3.7   < > 3.4*   < > 5.2   CHLORIDE mmol/L 106 102 98 91*   < > 104   < > 77*   CO2 mmol/L 25.0 24.0 23.0 18.0*   < > 13.0*   < > 3.0*   BUN mg/dL 22* 25* 27* 27*   < > 49*   < > 57*   CREATININE mg/dL 1.16* 1.32* 1.47* 1.49*   < > 1.86*   < > 2.35*   GLUCOSE mg/dL 118* 311* 504* 723*   < > 426*   <  > 1,192*   CALCIUM mg/dL 7.9* 8.2* 8.4* 8.4*   < > 7.4*   < > 8.3*   BILIRUBIN mg/dL  --   --   --  0.2  --  0.2  --  0.3   ALK PHOS U/L  --   --   --  203*  --  227*  --  280*   ALT (SGPT) U/L  --   --   --  20  --  41*  --  20   AST (SGOT) U/L  --   --   --  12  --  97*  --  43*   ANION GAP mmol/L 10.0 12.0 15.0 22.0*   < > 17.0*   < > 32.0*    < > = values in this interval not displayed.     Estimated Creatinine Clearance: 62.7 mL/min (A) (by C-G formula based on SCr of 1.16 mg/dL (H)).  Results from last 7 days   Lab Units 05/17/20  0344 05/16/20  2357 05/16/20 2028   MAGNESIUM mg/dL 1.5* 1.4* 1.6   PHOSPHORUS mg/dL 3.4 2.8 3.3         Results from last 7 days   Lab Units 05/16/20 2028 05/16/20  0812 05/14/20  0435 05/12/20  0610 05/11/20  2238   WBC 10*3/mm3 11.77* 11.04* 14.30* 5.05 19.62*   HEMOGLOBIN g/dL 9.0* 8.1* 12.5 9.7* 10.8*   HEMATOCRIT % 28.5* 24.0* 37.7 28.3* 37.4   PLATELETS 10*3/mm3 199 183 129* 194 288           Culture Data:   Blood Culture   Date Value Ref Range Status   05/15/2020 No growth at 24 hours  Preliminary     No results found for: URINECX  No results found for: RESPCX  No results found for: WOUNDCX  No results found for: STOOLCX  No components found for: BODYFLD    Radiology Data:   Imaging Results (Last 24 Hours)     ** No results found for the last 24 hours. **          I have reviewed the patient's current medications.     Assessment/Plan     Hospital Problem List:  Principal Problem:    Diabetic ketoacidosis without coma associated with diabetes mellitus due to underlying condition (CMS/HCC)  Active Problems:    DARRELL (acute kidney injury) (CMS/HCC)    Metabolic acidosis    HLD (hyperlipidemia)    Tobacco abuse    Hyponatremia    Dehydration    Acute metabolic encephalopathy    Anxiety    Gastroesophageal reflux disease    Hypothermia    Leukocytosis    Sepsis (CMS/HCC)    UTI (urinary tract infection)    Diabetic ketoacidosis (complicated by acute metabolic encephalopathy):  Resolved.  Continue basal insulin with Accu-Cheks and sliding scale insulin.  Will consult endocrinology in a..    Normocytic anemia: This is likely dilutional as patient does not have any evidence of acute blood loss.  Hemoglobin remained stable.  Continue to monitor.     Acute kidney injury (with metabolic acidosis): Resolved.  This is likely prerenal.  Last creatinine on record was 1.4 on 2/5/2018.      Hypokalemia: Continue potassium repletion protocol.       Hypophosphatemia: Resolved.    Hypomagnesemia: Continue magnesium repletion protocol.    Sepsis secondary to acute cystitis: Patient did screen positive for sepsis.  Continue antimicrobial therapy.  Blood and urine cultures showed E. Coli.  Repeat blood culture showed no growth.      Nicotine dependence: Continue nicotine patch.    Continue GI and DVT prophylaxis.    Deconditioning: Continue PT and OT.    Transfer out of CCU.    Discharge Planning: Likely discharge in a.      Misha Hart MD   05/17/20   10:23

## 2020-05-17 NOTE — THERAPY TREATMENT NOTE
Acute Care - Physical Therapy Treatment Note  [facility]     Patient Name: Ingrid Ortiz  : 1970  MRN: [id number]  Today's Date: 2020  Onset of Illness/Injury or Date of Surgery: 20          Admit Date: 2020    Visit Dx:    ICD-10-CM ICD-9-CM   1. Diabetic ketoacidosis without coma associated with diabetes mellitus due to underlying condition (CMS/HCC) E08.10 249.11   2. Acute renal failure, unspecified acute renal failure type (CMS/HCC) N17.9 584.9   3. Hypothermia, initial encounter T68.XXXA 991.6   4. Oropharyngeal dysphagia R13.12 787.22   5. Impaired mobility and activities of daily living Z74.09 799.89   6. Impaired functional mobility, balance, gait, and endurance Z74.09 V49.89     Patient Active Problem List   Diagnosis   • Diabetic ketoacidosis without coma associated with diabetes mellitus due to underlying condition (CMS/HCC)   • DARRELL (acute kidney injury) (CMS/[facility])   • Metabolic acidosis   • HLD (hyperlipidemia)   • Tobacco abuse   • Hyponatremia   • Dehydration   • Acute metabolic encephalopathy   • Anxiety   • Chronic obstructive pulmonary disease (CMS/HCC)   • Diabetes mellitus (CMS/HCC)   • Gastroesophageal reflux disease   • Non compliance with medical treatment   • Hyponatremia   • Hypothermia   • Leukocytosis   • Sepsis (CMS/HCC)   • UTI (urinary tract infection)       Therapy Treatment    Rehabilitation Treatment Summary     Row Name 20 1012             Treatment Time/Intention    Discipline  physical therapy assistant  -TW      Document Type  therapy note (daily note)  -TW      Subjective Information  no complaints  -TW      Mode of Treatment  physical therapy;individual therapy  -TW      Patient/Family Observations  Pt supine in bed. Spoke to nsg and to LPT (GP) who okayed pt to see at this point due to pt being t/f to ICU for DKA per nsg.  -TW      Patient Effort  good  -TW      Existing Precautions/Restrictions  fall  -TW      Recorded by [TW]  Javy Sanchez, PTA 05/17/20 1248      Row Name 05/17/20 1012             Vital Signs    Pre Systolic BP Rehab  132  -TW      Pre Treatment Diastolic BP  82  -TW      Intra Systolic BP Rehab  147  -TW      Intra Treatment Diastolic BP  70  -TW      Post Systolic BP Rehab  145  -TW      Post Treatment Diastolic BP  74  -TW      Pretreatment Heart Rate (beats/min)  100  -TW      Intratreatment Heart Rate (beats/min)  104  -TW      Posttreatment Heart Rate (beats/min)  103  -TW      Pre SpO2 (%)  100  -TW      O2 Delivery Pre Treatment  room air  -TW      Intra SpO2 (%)  100  -TW      Post SpO2 (%)  100  -TW      Pre Patient Position  Supine  -TW      Intra Patient Position  Standing  -TW      Post Patient Position  Supine  -TW      Recorded by [TW] Javy Sanchez, PTA 05/17/20 1248      Row Name 05/17/20 1012             Cognitive Assessment/Intervention- PT/OT    Affect/Mental Status (Cognitive)  WFL  -TW      Orientation Status (Cognition)  oriented x 4  -TW      Follows Commands (Cognition)  WFL  -TW      Cognitive Function (Cognitive)  WFL  -TW      Personal Safety Interventions  fall prevention program maintained;gait belt;nonskid shoes/slippers when out of bed  -TW      Recorded by [TW] Javy Sanchez, PTA 05/17/20 1248      Row Name 05/17/20 1012             Bed Mobility Assessment/Treatment    Supine-Sit [place] (Bed Mobility)  independent  -TW      Sit-Supine [place] (Bed Mobility)  independent  -TW      Assistive Device (Bed Mobility)  head of bed elevated  -TW      Recorded by [TW] Javy Sanchez, PTA 05/17/20 1248      Row Name 05/17/20 1012             Sit-Stand Transfer    Sit-Stand [place] (Transfers)  supervision  -TW      Recorded by [TW] Javy Sanchez, PTA 05/17/20 1248      Row Name 05/17/20 1012             Stand-Sit Transfer    Stand-Sit [place] (Transfers)  supervision  -TW      Recorded by [TW] Javy Sanchez, PTA 05/17/20 1248      Row Name  05/17/20 1012             Gait/Stairs Assessment/Training    Gait/Stairs Assessment/Training  gait/ambulation independence  -TW      [place] Level (Gait)  stand by assist;contact guard  -TW      Assistive Device (Gait)  other (see comments) No AD  -TW      Distance in Feet (Gait)  220ft  -TW      Pattern (Gait)  step-through  -TW      Deviations/Abnormal Patterns (Gait)  base of support, wide  -TW      Recorded by [TW] Javy Sanchez PTA 05/17/20 1248      Row Name 05/17/20 1012             Positioning and Restraints    Pre-Treatment Position  in bed  -TW      Post Treatment Position  bed  -TW      In Bed  supine;call light within reach;encouraged to call for assist  -TW      Recorded by [TW] Javy Sanchez PTA 05/17/20 1248      Row Name 05/17/20 1012             Pain Scale: Numbers Pre/Post-Treatment    Pain Scale: Numbers, Pretreatment  0/10 - no pain  -TW      Pain Scale: Numbers, Post-Treatment  0/10 - no pain  -TW      Recorded by [TW] Javy Sanchez PTA 05/17/20 1248      Row Name 05/17/20 1012             Outcome Summary/Treatment Plan (PT)    Daily Summary of Progress (PT)  progress toward functional goals is good  -TW      Plan for Continued Treatment (PT)  Cont  -TW      Anticipated Discharge Disposition (PT)  anticipate therapy at next level of care  -TW      Recorded by [TW] Javy Sanchez, [facility] 05/17/20 1248        User Key  (r) = Recorded By, (t) = Taken By, (c) = Cosigned By    Initials Name Effective Dates Discipline    TW Javy Sanchez PTA 03/07/18 -  PT               Rehab Goal Summary     Row Name 05/17/20 1012             Physical Therapy Goals    Gait Training Goal Selection (PT)  gait training, PT goal 1;gait training, PT goal (free text)  -TW      Stairs Goal Selection (PT)  stairs, PT goal 1  -TW         Gait Training Goal 1 (PT)    Activity/Assistive Device (Gait Training Goal 1, PT)  gait (walking locomotion)  -TW      [place] Level (Gait Training  Goal 1, PT)  independent  -TW      Distance (Gait Goal 1, PT)  300 ft or more w;/ VSS  -TW      Time Frame (Gait Training Goal 1, PT)  by discharge  -TW      Barriers (Gait Training Goal 1, PT)  ex patricia  -TW      Progress/Outcome (Gait Training Goal 1, PT)  goal not met  -TW         Gait Training Goal (PT)    Gait Training Goal (PT)  successful completion of TUG and Tinetti for low fall risk score before determining indep gait ; VSS  -TW      Time Frame (Gait Training Goal, PT)  by discharge  -TW      Barriers (Gait Training Goal, PT)  ex patricia  -TW      Progress/Outcome (Gait Training Goal, PT)  goal not met  -TW         Stairs Goal 1 (PT)    Activity/Assistive Device (Stairs Goal 1, PT)  ascending stairs;descending stairs  -TW      [place] Level/Cues Needed (Stairs Goal 1, PT)  independent  -TW      Number of Stairs (Stairs Goal 1, PT)  4 or more  -TW      Time Frame (Stairs Goal 1, PT)  by discharge  -TW      Barriers (Stairs Goal 1, PT)  weakness  -TW      Progress/Outcome (Stairs Goal 1, PT)  goal not met  -TW        User Key  (r) = Recorded By, (t) = Taken By, (c) = Cosigned By    Initials Name Provider Type Discipline    TW Javy Sanchez, PTA Physical Therapy Assistant PT          Physical Therapy Education                 Title: PT OT SLP Therapies (In Progress)     Topic: Physical Therapy (In Progress)     Point: Mobility training (Done)     Description:   Instruct learner(s) on safety and technique for assisting patient out of bed, chair or wheelchair.  Instruct in the proper use of assistive devices, such as walker, crutches, cane or brace.              Patient Friendly Description:   It's important to get you on your feet again, but we need to do so in a way that is safe for you. Falling has serious consequences, and your personal safety is the most important thing of all.        When it's time to get out of bed, one of us or a family member will sit next to you on the bed to give you support.      If your doctor or nurse tells you to use a walker, crutches, a cane, or a brace, be sure you use it every time you get out of bed, even if you think you don't need it.    Learning Progress Summary           Patient Acceptance, E, ISELA,NR by BRIANDA at 5/15/2020 6091                   Point: Home exercise program (Not Started)     Description:   Instruct learner(s) on appropriate technique for monitoring, assisting and/or progressing patient with therapeutic exercises and activities.              Learner Progress:   Not documented in this visit.          Point: Body mechanics (Not Started)     Description:   Instruct learner(s) on proper positioning and spine alignment for patient and/or caregiver during mobility tasks and/or exercises.              Learner Progress:   Not documented in this visit.          Point: Precautions (Not Started)     Description:   Instruct learner(s) on prescribed precautions during mobility and gait tasks              Learner Progress:   Not documented in this visit.                      User Key     Initials Effective Dates Name Provider Type Discipline     04/03/18 -  Damari Jeffries, PT Physical Therapist PT                PT Recommendation and Plan  Anticipated Discharge Disposition (PT): anticipate therapy at next level of care  Outcome Summary/Treatment Plan (PT)  Daily Summary of Progress (PT): progress toward functional goals is good  Plan for Continued Treatment (PT): Cont  Anticipated Discharge Disposition (PT): anticipate therapy at next level of care  Plan of Care Reviewed With: patient  Progress: improving  Outcome Summary: Pt t/f to ICU last night due to DKA per nsg. Spoke with LPT who okays PTA to cont tx and monitor VS closely. Pt able to perform t/f's with Supervision to CGA and amb 220ft without AD with some unsteadiness but no LOB with VSS throughout. Pt would cont to benefit from therapy upon DC.  Outcome Measures     Row Name 05/17/20 1012 05/16/20 6850  05/16/20 1025       How much help from another person do you currently need...    Turning from your back to your side while in flat bed without using bedrails?  4  -TW  4  -TW  --    Moving from lying on back to sitting on the side of a flat bed without bedrails?  4  -TW  4  -TW  --    Moving to and from a bed to a chair (including a wheelchair)?  3  -TW  3  -TW  --    Standing up from a chair using your arms (e.g., wheelchair, bedside chair)?  3  -TW  3  -TW  --    Climbing 3-5 steps with a railing?  3  -TW  3  -TW  --    To walk in hospital room?  3  -TW  3  -TW  --    AM-PAC 6 Clicks Score (PT)  20  -TW  20  -TW  --       How much help from another is currently needed...    Putting on and taking off regular lower body clothing?  --  --  3  -KD    Bathing (including washing, rinsing, and drying)  --  --  3  -KD    Toileting (which includes using toilet bed pan or urinal)  --  --  3  -KD    Putting on and taking off regular upper body clothing  --  --  3  -KD    Taking care of personal grooming (such as brushing teeth)  --  --  3  -KD    Eating meals  --  --  3  -KD    AM-PAC 6 Clicks Score (OT)  --  --  18  -KD    Row Name 05/15/20 1600 05/15/20 1340          How much help from another person do you currently need...    Turning from your back to your side while in flat bed without using bedrails?  4  -GB  --     Moving from lying on back to sitting on the side of a flat bed without bedrails?  3  -GB  --     Moving to and from a bed to a chair (including a wheelchair)?  3  -GB  --     Standing up from a chair using your arms (e.g., wheelchair, bedside chair)?  3  -GB  --     Climbing 3-5 steps with a railing?  2  -GB  --     To walk in hospital room?  3  -GB  --     AM-PAC 6 Clicks Score (PT)  18  -GB  --        How much help from another is currently needed...    Putting on and taking off regular lower body clothing?  --  3  -JH     Bathing (including washing, rinsing, and drying)  --  3  -JH     Toileting (which  includes using toilet bed pan or urinal)  --  3  -JH     Putting on and taking off regular upper body clothing  --  3  -JH     Taking care of personal grooming (such as brushing teeth)  --  3  -JH     Eating meals  --  3  -JH     AM-PAC 6 Clicks Score (OT)  --  18  -        Functional Assessment    Outcome Measure Options  AM-PAC 6 Clicks Basic Mobility (PT)  -  AM-PAC 6 Clicks Daily Activity (OT)  -       User Key  (r) = Recorded By, (t) = Taken By, (c) = Cosigned By    Initials Name Provider Type    GB Damari Jeffries, PT Physical Therapist    TW Javy Sanchez, PTA Physical Therapy Assistant    Kimberly Worley, PIERRE/L Occupational Therapy Assistant    Jeremiah Fajardo, OT Occupational Therapist         Time Calculation:   PT Charges     Row Name 05/17/20 1252             Time Calculation    Start Time  1012  -TW      Stop Time  1039  -TW      Time Calculation (min)  27 min  -TW      PT Received On  05/17/20  -TW      PT Goal Re-Cert Due Date  05/24/20  -TW         Time Calculation- PT    Total Timed Code Minutes- PT  27 minute(s)  -TW        User Key  (r) = Recorded By, (t) = Taken By, (c) = Cosigned By    Initials Name Provider Type    Javy Melton PTA Physical Therapy Assistant        Therapy Charges for Today     Code Description Service Date Service Provider Modifiers Qty    01987873932 HC GAIT TRAINING EA 15 MIN 5/16/2020 Javy Sanchez, PTA GP 1    46430201422 HC PT THERAPEUTIC ACT EA 15 MIN 5/16/2020 Javy Sanchez, PTA GP 1    69880185985 HC GAIT TRAINING EA 15 MIN 5/17/2020 Javy Sanchez, PTA GP 1    59753275133 HC PT THERAPEUTIC ACT EA 15 MIN 5/17/2020 Javy Sanchez, PTA GP 1          PT G-Codes  Outcome Measure Options: AM-PAC 6 Clicks Basic Mobility (PT)  AM-PAC 6 Clicks Score (PT): 20  AM-PAC 6 Clicks Score (OT): 18    Javy Sanchez PTA  5/17/2020

## 2020-05-17 NOTE — PLAN OF CARE
Problem: Patient Care Overview  Goal: Plan of Care Review  Outcome: Ongoing (interventions implemented as appropriate)  Flowsheets (Taken 5/17/2020 1012)  Progress: improving  Outcome Summary: Pt t/f to ICU last night due to DKA per nsg. Spoke with LPT who okays PTA to cont tx and monitor VS closely. Pt able to perform t/f's with Supervision to CGA and amb 220ft without AD with some unsteadiness but no LOB with VSS throughout. Pt would cont to benefit from therapy upon DC.

## 2020-05-17 NOTE — SIGNIFICANT NOTE
05/17/20 0836   Rehab Treatment   Discipline occupational therapy assistant   Reason Treatment Not Performed unable to treat, medical status change  (Pt t/f'd to SDU, will need R/S orders when appropriate.)

## 2020-05-18 VITALS
DIASTOLIC BLOOD PRESSURE: 99 MMHG | SYSTOLIC BLOOD PRESSURE: 148 MMHG | RESPIRATION RATE: 18 BRPM | TEMPERATURE: 97.5 F | HEIGHT: 66 IN | WEIGHT: 151.46 LBS | OXYGEN SATURATION: 97 % | BODY MASS INDEX: 24.34 KG/M2 | HEART RATE: 110 BPM

## 2020-05-18 LAB
ANION GAP SERPL CALCULATED.3IONS-SCNC: 9 MMOL/L (ref 5–15)
BUN BLD-MCNC: 15 MG/DL (ref 6–20)
BUN/CREAT SERPL: 12.9 (ref 7–25)
CALCIUM SPEC-SCNC: 8.2 MG/DL (ref 8.6–10.5)
CHLORIDE SERPL-SCNC: 104 MMOL/L (ref 98–107)
CO2 SERPL-SCNC: 27 MMOL/L (ref 22–29)
CREAT BLD-MCNC: 1.16 MG/DL (ref 0.57–1)
GFR SERPL CREATININE-BSD FRML MDRD: 49 ML/MIN/1.73
GLUCOSE BLD-MCNC: 120 MG/DL (ref 65–99)
MAGNESIUM SERPL-MCNC: 1.4 MG/DL (ref 1.6–2.6)
MAGNESIUM SERPL-MCNC: 1.5 MG/DL (ref 1.6–2.6)
MAGNESIUM SERPL-MCNC: 1.6 MG/DL (ref 1.6–2.6)
PHOSPHATE SERPL-MCNC: 2.9 MG/DL (ref 2.5–4.5)
PHOSPHATE SERPL-MCNC: 3 MG/DL (ref 2.5–4.5)
PHOSPHATE SERPL-MCNC: 3.4 MG/DL (ref 2.5–4.5)
POTASSIUM BLD-SCNC: 3.2 MMOL/L (ref 3.5–5.2)
SODIUM BLD-SCNC: 140 MMOL/L (ref 136–145)
WHOLE BLOOD HOLD SPECIMEN: NORMAL

## 2020-05-18 PROCEDURE — 97116 GAIT TRAINING THERAPY: CPT

## 2020-05-18 PROCEDURE — 97530 THERAPEUTIC ACTIVITIES: CPT

## 2020-05-18 PROCEDURE — 83735 ASSAY OF MAGNESIUM: CPT | Performed by: INTERNAL MEDICINE

## 2020-05-18 PROCEDURE — 84100 ASSAY OF PHOSPHORUS: CPT | Performed by: INTERNAL MEDICINE

## 2020-05-18 PROCEDURE — 80048 BASIC METABOLIC PNL TOTAL CA: CPT | Performed by: INTERNAL MEDICINE

## 2020-05-18 RX ORDER — POTASSIUM CHLORIDE 750 MG/1
40 CAPSULE, EXTENDED RELEASE ORAL ONCE
Status: COMPLETED | OUTPATIENT
Start: 2020-05-18 | End: 2020-05-18

## 2020-05-18 RX ORDER — TRAZODONE HYDROCHLORIDE 100 MG/1
100 TABLET ORAL NIGHTLY
Status: DISCONTINUED | OUTPATIENT
Start: 2020-05-18 | End: 2020-05-18 | Stop reason: HOSPADM

## 2020-05-18 RX ADMIN — FAMOTIDINE 20 MG: 20 TABLET, FILM COATED ORAL at 09:42

## 2020-05-18 RX ADMIN — VENLAFAXINE HYDROCHLORIDE 75 MG: 75 CAPSULE, EXTENDED RELEASE ORAL at 09:42

## 2020-05-18 RX ADMIN — GABAPENTIN 800 MG: 400 CAPSULE ORAL at 05:23

## 2020-05-18 RX ADMIN — ATORVASTATIN CALCIUM 10 MG: 10 TABLET, FILM COATED ORAL at 09:42

## 2020-05-18 RX ADMIN — SODIUM CHLORIDE, PRESERVATIVE FREE 10 ML: 5 INJECTION INTRAVENOUS at 09:42

## 2020-05-18 RX ADMIN — POTASSIUM CHLORIDE 40 MEQ: 10 CAPSULE, COATED, EXTENDED RELEASE ORAL at 09:42

## 2020-05-18 RX ADMIN — TRAZODONE HYDROCHLORIDE 100 MG: 100 TABLET ORAL at 00:21

## 2020-05-18 RX ADMIN — NICOTINE 1 PATCH: 21 PATCH, EXTENDED RELEASE TRANSDERMAL at 09:43

## 2020-05-18 NOTE — PLAN OF CARE
Problem: Patient Care Overview  Goal: Plan of Care Review  Outcome: Ongoing (interventions implemented as appropriate)  Flowsheets (Taken 5/18/2020 1121)  Progress: improving  Plan of Care Reviewed With: caregiver; patient  Outcome Summary: Much improved.  Alert and taking po.  Education on diabetes reviewed.

## 2020-05-18 NOTE — THERAPY TREATMENT NOTE
Acute Care - Physical Therapy Treatment Note  [facility]     Patient Name: Ingrid Ortiz  : 1970  MRN: [id number]  Today's Date: 2020  Onset of Illness/Injury or Date of Surgery: 20          Admit Date: 2020    Visit Dx:    ICD-10-CM ICD-9-CM   1. Diabetic ketoacidosis without coma associated with diabetes mellitus due to underlying condition (CMS/[facility]) E08.10 249.11   2. Acute renal failure, unspecified acute renal failure type (CMS/HCC) N17.9 584.9   3. Hypothermia, initial encounter T68.XXXA 991.6   4. Oropharyngeal dysphagia R13.12 787.22   5. Impaired mobility and activities of daily living Z74.09 799.89   6. Impaired functional mobility, balance, gait, and endurance Z74.09 V49.89     Patient Active Problem List   Diagnosis   • Diabetic ketoacidosis without coma associated with diabetes mellitus due to underlying condition (CMS/[facility])   • DARRELL (acute kidney injury) (CMS/[facility])   • Metabolic acidosis   • HLD (hyperlipidemia)   • Tobacco abuse   • Hyponatremia   • Dehydration   • Acute metabolic encephalopathy   • Anxiety   • Chronic obstructive pulmonary disease (CMS/HCC)   • Diabetes mellitus (CMS/[facility])   • Gastroesophageal reflux disease   • Non compliance with medical treatment   • Hyponatremia   • Hypothermia   • Leukocytosis   • Sepsis (CMS/[facility])   • UTI (urinary tract infection)       Therapy Treatment    Rehabilitation Treatment Summary     Row Name 20 1013             Treatment Time/Intention    Discipline  physical therapy assistant  -EM      Document Type  therapy note (daily note)  -EM      Subjective Information  no complaints  -EM      Mode of Treatment  physical therapy;individual therapy  -EM      Therapy Frequency (PT Clinical Impression)  daily  -EM      Patient Effort  good  -EM      Comment  Pt awaiting discharge orders. She is agreeable for gt, but declined therex.  -EM      Existing Precautions/Restrictions  fall  -EM      Recorded by [EM] Francis Lawrence,  PTA 05/18/20 1152      Row Name 05/18/20 1013             Vital Signs    Pre Systolic BP Rehab  156  -EM      Pre Treatment Diastolic BP  83  -EM      Post Systolic BP Rehab  148  -EM      Post Treatment Diastolic BP  99  -EM      Pretreatment Heart Rate (beats/min)  101  -EM      Posttreatment Heart Rate (beats/min)  104  -EM      Pre SpO2 (%)  97  -EM      O2 Delivery Pre Treatment  room air  -EM      Post SpO2 (%)  99  -EM      O2 Delivery Post Treatment  room air  -EM      Pre Patient Position  Supine  -EM      Intra Patient Position  Standing  -EM      Post Patient Position  Supine  -EM      Recorded by [EM] Francis Lawrence, PTA 05/18/20 1152      Row Name 05/18/20 1013             Cognitive Assessment/Intervention- PT/OT    Affect/Mental Status (Cognitive)  WFL  -EM      Orientation Status (Cognition)  oriented x 4  -EM      Follows Commands (Cognition)  WFL  -EM      Cognitive Function (Cognitive)  WFL  -EM      Personal Safety Interventions  fall prevention program maintained;gait belt;nonskid shoes/slippers when out of bed;supervised activity  -EM      Recorded by [EM] Francis Lawrence, PTA 05/18/20 1152      Row Name 05/18/20 1013             Bed Mobility Assessment/Treatment    Supine-Sit [place] (Bed Mobility)  independent  -EM      Sit-Supine [place] (Bed Mobility)  independent  -EM      Recorded by [EM] Francis Lawrence, PTA 05/18/20 1152      Row Name 05/18/20 1013             Sit-Stand Transfer    Sit-Stand [place] (Transfers)  independent  -EM      Recorded by [EM] Francis Lawrence, PTA 05/18/20 1152      Row Name 05/18/20 1013             Stand-Sit Transfer    Stand-Sit [place] (Transfers)  independent  -EM      Recorded by [EM] Francis Lawrence, PTA 05/18/20 1152      Row Name 05/18/20 1013             Gait/Stairs Assessment/Training    [place] Level (Gait)  independent  -EM      Assistive Device (Gait)  -- no AD  -EM      Distance in Feet (Gait)  416'  -EM      Pattern (Gait)   step-through  -EM      Recorded by [EM] Francis Lawrence, PTA 05/18/20 1152      Row Name 05/18/20 1013             Positioning and Restraints    Pre-Treatment Position  in bed  -EM      Post Treatment Position  bed  -EM      In Bed  supine;call light within reach;encouraged to call for assist;exit alarm on  -EM      Recorded by [EM] Francis Lawrence, PTA 05/18/20 1152      Row Name 05/18/20 1013             Pain Scale: Numbers Pre/Post-Treatment    Pain Scale: Numbers, Pretreatment  0/10 - no pain  -EM      Pain Scale: Numbers, Post-Treatment  0/10 - no pain  -EM      Recorded by [EM] Francis Lawrence, PTA 05/18/20 1152      Row Name 05/18/20 1013             Outcome Summary/Treatment Plan (PT)    Daily Summary of Progress (PT)  progress toward functional goals as expected  -EM      Plan for Continued Treatment (PT)  Continue  -EM      Anticipated Discharge Disposition (PT)  home with assist;anticipate therapy at next level of care  -EM      Recorded by [EM] Francis Lawrence, PTA 05/18/20 1152        User Key  (r) = Recorded By, (t) = Taken By, (c) = Cosigned By    Initials Name Effective Dates Discipline    EM Francis Lawrence, PTA 08/11/15 -  PT               Rehab Goal Summary     Row Name 05/18/20 1013             Physical Therapy Goals    Gait Training Goal Selection (PT)  gait training, PT goal 1;gait training, PT goal (free text)  -EM      Stairs Goal Selection (PT)  stairs, PT goal 1  -EM         Gait Training Goal 1 (PT)    Activity/Assistive Device (Gait Training Goal 1, PT)  gait (walking locomotion)  -EM      [place] Level (Gait Training Goal 1, PT)  independent  -EM      Distance (Gait Goal 1, PT)  300 ft or more w;/ VSS  -EM      Time Frame (Gait Training Goal 1, PT)  by discharge  -EM      Barriers (Gait Training Goal 1, PT)  ex patricia  -EM      Progress/Outcome (Gait Training Goal 1, PT)  (S) goal met  -EM         Gait Training Goal (PT)    Gait Training Goal (PT)  successful completion of TUG and Tinetti  for low fall risk score before determining indep gait ; VSS  -EM      Time Frame (Gait Training Goal, PT)  by discharge  -EM      Barriers (Gait Training Goal, PT)  ex patricia  -EM      Progress/Outcome (Gait Training Goal, PT)  goal not met  -EM         Stairs Goal 1 (PT)    Activity/Assistive Device (Stairs Goal 1, PT)  ascending stairs;descending stairs  -EM      [place] Level/Cues Needed (Stairs Goal 1, PT)  independent  -EM      Number of Stairs (Stairs Goal 1, PT)  4 or more  -EM      Time Frame (Stairs Goal 1, PT)  by discharge  -EM      Barriers (Stairs Goal 1, PT)  weakness  -EM      Progress/Outcome (Stairs Goal 1, PT)  goal not met  -EM        User Key  (r) = Recorded By, (t) = Taken By, (c) = Cosigned By    Initials Name Provider Type Discipline    EM Francis Lawrence, PTA Physical Therapy Assistant PT          Physical Therapy Education                 Title: PT OT SLP Therapies (In Progress)     Topic: Physical Therapy (In Progress)     Point: Mobility training (Done)     Description:   Instruct learner(s) on safety and technique for assisting patient out of bed, chair or wheelchair.  Instruct in the proper use of assistive devices, such as walker, crutches, cane or brace.              Patient Friendly Description:   It's important to get you on your feet again, but we need to do so in a way that is safe for you. Falling has serious consequences, and your personal safety is the most important thing of all.        When it's time to get out of bed, one of us or a family member will sit next to you on the bed to give you support.     If your doctor or nurse tells you to use a walker, crutches, a cane, or a brace, be sure you use it every time you get out of bed, even if you think you don't need it.    Learning Progress Summary           Patient Acceptance, E, VU,NR by BRIANDA at 5/15/2020 1528                   Point: Home exercise program (Not Started)     Description:   Instruct learner(s) on appropriate  technique for monitoring, assisting and/or progressing patient with therapeutic exercises and activities.              Learner Progress:   Not documented in this visit.          Point: Body mechanics (Not Started)     Description:   Instruct learner(s) on proper positioning and spine alignment for patient and/or caregiver during mobility tasks and/or exercises.              Learner Progress:   Not documented in this visit.          Point: Precautions (Not Started)     Description:   Instruct learner(s) on prescribed precautions during mobility and gait tasks              Learner Progress:   Not documented in this visit.                      User Key     Initials Effective Dates Name Provider Type Discipline     04/03/18 -  Damari Jeffries, PT Physical Therapist PT                PT Recommendation and Plan  Anticipated Discharge Disposition (PT): home with assist, anticipate therapy at next level of care  Therapy Frequency (PT Clinical Impression): daily  Outcome Summary/Treatment Plan (PT)  Daily Summary of Progress (PT): progress toward functional goals as expected  Plan for Continued Treatment (PT): Continue  Anticipated Discharge Disposition (PT): home with assist, anticipate therapy at next level of care  Plan of Care Reviewed With: patient  Progress: improving  Outcome Summary: Pt awaiting dicharge orders, she is agreeable for gt and bed mobility, however, declined therex. Pt t/f sup-sit-sup Ind, sit-stand-sit Ind. Pt amb 416' with no AD Ind. 1 goal met this tx.  Outcome Measures     Row Name 05/17/20 1012 05/16/20 1416 05/16/20 1025       How much help from another person do you currently need...    Turning from your back to your side while in flat bed without using bedrails?  4  -TW  4  -TW  --    Moving from lying on back to sitting on the side of a flat bed without bedrails?  4  -TW  4  -TW  --    Moving to and from a bed to a chair (including a wheelchair)?  3  -TW  3  -TW  --    Standing up  from a chair using your arms (e.g., wheelchair, bedside chair)?  3  -TW  3  -TW  --    Climbing 3-5 steps with a railing?  3  -TW  3  -TW  --    To walk in hospital room?  3  -TW  3  -TW  --    AM-PAC 6 Clicks Score (PT)  20  -TW  20  -TW  --       How much help from another is currently needed...    Putting on and taking off regular lower body clothing?  --  --  3  -KD    Bathing (including washing, rinsing, and drying)  --  --  3  -KD    Toileting (which includes using toilet bed pan or urinal)  --  --  3  -KD    Putting on and taking off regular upper body clothing  --  --  3  -KD    Taking care of personal grooming (such as brushing teeth)  --  --  3  -KD    Eating meals  --  --  3  -KD    AM-PAC 6 Clicks Score (OT)  --  --  18  -KD    Row Name 05/15/20 1600 05/15/20 1340          How much help from another person do you currently need...    Turning from your back to your side while in flat bed without using bedrails?  4  -GB  --     Moving from lying on back to sitting on the side of a flat bed without bedrails?  3  -GB  --     Moving to and from a bed to a chair (including a wheelchair)?  3  -GB  --     Standing up from a chair using your arms (e.g., wheelchair, bedside chair)?  3  -GB  --     Climbing 3-5 steps with a railing?  2  -GB  --     To walk in hospital room?  3  -GB  --     AM-PAC 6 Clicks Score (PT)  18  -GB  --        How much help from another is currently needed...    Putting on and taking off regular lower body clothing?  --  3  -JH     Bathing (including washing, rinsing, and drying)  --  3  -JH     Toileting (which includes using toilet bed pan or urinal)  --  3  -JH     Putting on and taking off regular upper body clothing  --  3  -JH     Taking care of personal grooming (such as brushing teeth)  --  3  -JH     Eating meals  --  3  -JH     AM-PAC 6 Clicks Score (OT)  --  18  -JH        Functional Assessment    Outcome Measure Options  AM-PAC 6 Clicks Basic Mobility (PT)  -GB  AM-PAC 6 Clicks  Daily Activity (OT)  -       User Key  (r) = Recorded By, (t) = Taken By, (c) = Cosigned By    Initials Name Provider Type    GB Damari Jeffries, PT Physical Therapist    TW Javy Sanchez, PTA Physical Therapy Assistant    KD Kimberly Jameson, PIERRE/L Occupational Therapy Assistant    Jeremiah Fajardo, OT Occupational Therapist         Time Calculation:   PT Charges     Row Name 05/18/20 1154             Time Calculation    Start Time  1013  -EM      Stop Time  1036  -EM      Time Calculation (min)  23 min  -EM         Time Calculation- PT    Total Timed Code Minutes- PT  23 minute(s)  -EM        User Key  (r) = Recorded By, (t) = Taken By, (c) = Cosigned By    Initials Name Provider Type    EM Francis Lawrence PTA Physical Therapy Assistant        Therapy Charges for Today     Code Description Service Date Service Provider Modifiers Qty    31156159849 HC PT THERAPEUTIC ACT EA 15 MIN 5/18/2020 Francis Lawrence PTA GP 1    88008097253 HC GAIT TRAINING EA 15 MIN 5/18/2020 Francis Lawrence PTA GP 1          PT G-Codes  Outcome Measure Options: AM-PAC 6 Clicks Basic Mobility (PT)  AM-PAC 6 Clicks Score (PT): 20  AM-PAC 6 Clicks Score (OT): 18    Francis Lawrence PTA  5/18/2020

## 2020-05-18 NOTE — DISCHARGE SUMMARY
[facility] Medicine Services  DISCHARGE SUMMARY       Date of Admission: 5/11/2020  Date of Discharge:  5/18/2020  Primary Care Physician: Provider, No Known    Presenting Problem/History of Present Illness:  Hypothermia, initial encounter [T68.XXXA]  Diabetic ketoacidosis without coma associated with diabetes mellitus due to underlying condition (CMS/[facility]) [E08.10]  Acute renal failure, unspecified acute renal failure type (CMS/[facility]) [N17.9]  DKA (diabetic ketoacidoses) (CMS/[facility]) [E11.10]   The patient is a 49-year-old white female with history of diabetes mellitus, GERD, hyperlipidemia, anxiety, tobacco abuse and noncompliance who was admitted with history of feeling extremely weak and with some confusion.  The patient was evaluated in the ER noted to have very elevated blood sugar of over thousand with diabetic ketoacidosis.  She appeared very dehydrated and her creatinine was elevated at 2.  White count was elevated at 25,000 and she was hypothermic on admission requiring Jarrod hugger to warm her up.  The patient was also hyponatremic with sodium of 114.  The patient was given some fluid boluses and some insulin and cultures were taken and she was started on broad-spectrum antibiotics in the ER.  Urinalysis showed changes suggesting possible UTI.       Final Discharge Diagnoses:  Active Hospital Problems    Diagnosis   • **Diabetic ketoacidosis without coma associated with diabetes mellitus due to underlying condition (CMS/[facility])   • DARRELL (acute kidney injury) (CMS/[facility])   • Metabolic acidosis   • HLD (hyperlipidemia)   • Tobacco abuse   • Hyponatremia   • Dehydration   • Hypothermia   • Leukocytosis   • Sepsis (CMS/[facility])   • UTI (urinary tract infection)   • Anxiety   • Gastroesophageal reflux disease   • Acute metabolic encephalopathy       Consults:   Consults     No orders found from 4/12/2020 to 5/12/2020.          Procedures Performed: None.                Pertinent Test  Results:   Lab Results (last 7 days)     Procedure Component Value Units Date/Time    Phosphorus [[id number]]  (Normal) Collected:  05/18/20 0813    Specimen:  Blood Updated:  05/18/20 0831     Phosphorus 3.4 mg/dL     Magnesium [[id number]]  (Normal) Collected:  05/18/20 0813    Specimen:  Blood Updated:  05/18/20 0831     Magnesium 1.6 mg/dL     Extra Tubes [[id number]] Collected:  05/18/20 0340    Specimen:  Blood, Venous Line Updated:  05/18/20 0445    Narrative:       The following orders were created for panel order Extra Tubes.  Procedure                               Abnormality         Status                     ---------                               -----------         ------                     Lavender Top[[id number]]                                     Final result                 Please view results for these tests on the individual orders.    Lavender Top [[id number]] Collected:  05/18/20 0340    Specimen:  Blood Updated:  05/18/20 0445     Extra Tube hold for add-on     Comment: Auto resulted       Basic Metabolic Panel [[id number]]  (Abnormal) Collected:  05/18/20 0340    Specimen:  Blood Updated:  05/18/20 0404     Glucose 120 mg/dL      BUN 15 mg/dL      Creatinine 1.16 mg/dL      Sodium 140 mmol/L      Potassium 3.2 mmol/L      Chloride 104 mmol/L      CO2 27.0 mmol/L      Calcium 8.2 mg/dL      eGFR Non African Amer 49 mL/min/1.73      BUN/Creatinine Ratio 12.9     Anion Gap 9.0 mmol/L     Narrative:       GFR Normal >60  Chronic Kidney Disease <60  Kidney Failure <15      Magnesium [[id number]]  (Abnormal) Collected:  05/18/20 0340    Specimen:  Blood Updated:  05/18/20 0404     Magnesium 1.5 mg/dL     Phosphorus [[id number]]  (Normal) Collected:  05/18/20 0340    Specimen:  Blood Updated:  05/18/20 0404     Phosphorus 2.9 mg/dL     Magnesium [[id number]]  (Abnormal) Collected:  05/18/20 0000    Specimen:  Blood Updated:  05/18/20 0019     Magnesium 1.4 mg/dL     Phosphorus [[id number]]  (Normal)  Collected:  05/18/20 0000    Specimen:  Blood Updated:  05/18/20 0019     Phosphorus 3.0 mg/dL     POC Glucose Once [[id number]]  (Abnormal) Collected:  05/17/20 2013    Specimen:  Blood Updated:  05/17/20 2118     Glucose 181 mg/dL      Comment: : [id number] DAMIR RICHARDAMeter ID: [id number]       Magnesium [[id number]]  (Abnormal) Collected:  05/17/20 1944    Specimen:  Blood Updated:  05/17/20 2018     Magnesium 1.5 mg/dL     Phosphorus [[id number]]  (Normal) Collected:  05/17/20 1944    Specimen:  Blood Updated:  05/17/20 2018     Phosphorus 2.9 mg/dL     POC Glucose Once [[id number]]  (Abnormal) Collected:  05/17/20 1823    Specimen:  Blood Updated:  05/17/20 1834     Glucose 199 mg/dL      Comment: : [id number] GUANAKO CHADWICKFERMeter ID: [id number]       POC Glucose Once [[id number]]  (Abnormal) Collected:  05/17/20 1715    Specimen:  Blood Updated:  05/17/20 1735     Glucose 165 mg/dL      Comment: : [id number] GUANAKO CHADWICKFERMeter ID: [id number]       Magnesium [[id number]]  (Normal) Collected:  05/17/20 1557    Specimen:  Blood Updated:  05/17/20 1618     Magnesium 1.6 mg/dL     Phosphorus [[id number]]  (Abnormal) Collected:  05/17/20 1557    Specimen:  Blood Updated:  05/17/20 1618     Phosphorus 2.4 mg/dL     POC Glucose Once [[id number]]  (Abnormal) Collected:  05/17/20 1449    Specimen:  Blood Updated:  05/17/20 1501     Glucose 158 mg/dL      Comment: Result Not ConfirmedOperator: [id number] GUANAKO FARRUKHFERMeter ID: [id number]       POC Glucose Once [[id number]]  (Abnormal) Collected:  05/16/20 1643    Specimen:  Blood Updated:  05/17/20 1404     Glucose 522 mg/dL      Comment: RN NotifiedOperator: [id number] TYE Pastrana ID: [id number]       POC Glucose Once [[id number]]  (Abnormal) Collected:  05/17/20 1208    Specimen:  Blood Updated:  05/17/20 1224     Glucose 313 mg/dL      Comment: : [id number] GUANAKO CHADWICKReunion Rehabilitation Hospital PhoenixMeter ID: [id number]       Blood Culture - Blood,  Arm, Left [[id number]] Collected:  05/15/20 1210    Specimen:  Blood from Arm, Left Updated:  05/17/20 1215     Blood Culture No growth at 2 days    Extra Tubes [[id number]] Collected:  05/17/20 1001    Specimen:  Blood, Venous Line Updated:  05/17/20 1115    Narrative:       The following orders were created for panel order Extra Tubes.  Procedure                               Abnormality         Status                     ---------                               -----------         ------                     Lavender Top[[id number]]                                     Final result                 Please view results for these tests on the individual orders.    Lavender Top [[id number]] Collected:  05/17/20 1001    Specimen:  Blood Updated:  05/17/20 1115     Extra Tube hold for add-on     Comment: Auto resulted       Magnesium [[id number]]  (Abnormal) Collected:  05/17/20 1001    Specimen:  Blood Updated:  05/17/20 1035     Magnesium 1.4 mg/dL     Phosphorus [[id number]]  (Normal) Collected:  05/17/20 1001    Specimen:  Blood Updated:  05/17/20 1035     Phosphorus 3.0 mg/dL     POC Glucose Once [[id number]]  (Normal) Collected:  05/17/20 0616    Specimen:  Blood Updated:  05/17/20 0627     Glucose 128 mg/dL      Comment: : [id number] DAMIR CYNTHIAMeter ID: [id number]       POC Glucose Once [[id number]]  (Normal) Collected:  05/17/20 0403    Specimen:  Blood Updated:  05/17/20 0627     Glucose 112 mg/dL      Comment: : [id number] [employer] CYNTHIAMeter ID: [id number]       Basic Metabolic Panel [[id number]]  (Abnormal) Collected:  05/17/20 0344    Specimen:  Blood Updated:  05/17/20 0446     Glucose 118 mg/dL      BUN 22 mg/dL      Creatinine 1.16 mg/dL      Sodium 141 mmol/L      Potassium 3.5 mmol/L      Chloride 106 mmol/L      CO2 25.0 mmol/L      Calcium 7.9 mg/dL      eGFR Non African Amer 49 mL/min/1.73      BUN/Creatinine Ratio 19.0     Anion Gap 10.0 mmol/L     Narrative:       GFR Normal  >60  Chronic Kidney Disease <60  Kidney Failure <15      Magnesium [[id number]]  (Abnormal) Collected:  05/17/20 0344    Specimen:  Blood Updated:  05/17/20 0446     Magnesium 1.5 mg/dL     Phosphorus [[id number]]  (Normal) Collected:  05/17/20 0344    Specimen:  Blood Updated:  05/17/20 0446     Phosphorus 3.4 mg/dL     POC Glucose Once [[id number]]  (Normal) Collected:  05/17/20 0310    Specimen:  Blood Updated:  05/17/20 0336     Glucose 100 mg/dL      Comment: : [id number] DAMIR CYNTHIAMeter ID: [id number]       POC Glucose Once [[id number]]  (Normal) Collected:  05/17/20 0152    Specimen:  Blood Updated:  05/17/20 0336     Glucose 122 mg/dL      Comment: : [id number] DAMIR CYNTHIAMeter ID: [id number]       POC Glucose Once [[id number]]  (Abnormal) Collected:  05/17/20 0043    Specimen:  Blood Updated:  05/17/20 0336     Glucose 213 mg/dL      Comment: : [id number] [employer] CYNTHIAMeter ID: [id number]       Magnesium [[id number]]  (Abnormal) Collected:  05/16/20 2357    Specimen:  Blood Updated:  05/17/20 0105     Magnesium 1.4 mg/dL     Phosphorus [[id number]]  (Normal) Collected:  05/16/20 2357    Specimen:  Blood Updated:  05/17/20 0105     Phosphorus 2.8 mg/dL     Basic Metabolic Panel [[id number]]  (Abnormal) Collected:  05/16/20 2357    Specimen:  Blood Updated:  05/17/20 0105     Glucose 311 mg/dL      BUN 25 mg/dL      Creatinine 1.32 mg/dL      Sodium 138 mmol/L      Potassium 3.1 mmol/L      Chloride 102 mmol/L      CO2 24.0 mmol/L      Calcium 8.2 mg/dL      eGFR Non African Amer 43 mL/min/1.73      BUN/Creatinine Ratio 18.9     Anion Gap 12.0 mmol/L     Narrative:       GFR Normal >60  Chronic Kidney Disease <60  Kidney Failure <15      Basic Metabolic Panel [[id number]]  (Abnormal) Collected:  05/16/20 2215    Specimen:  Blood Updated:  05/16/20 2252     Glucose 504 mg/dL      BUN 27 mg/dL      Creatinine 1.47 mg/dL      Sodium 136 mmol/L      Potassium 3.4 mmol/L      Chloride 98  mmol/L      CO2 23.0 mmol/L      Calcium 8.4 mg/dL      eGFR Non African Amer 38 mL/min/1.73      BUN/Creatinine Ratio 18.4     Anion Gap 15.0 mmol/L     Narrative:       GFR Normal >60  Chronic Kidney Disease <60  Kidney Failure <15      Phosphorus [[id number]]  (Normal) Collected:  05/16/20 2028    Specimen:  Blood Updated:  05/16/20 2131     Phosphorus 3.3 mg/dL     Magnesium [[id number]]  (Normal) Collected:  05/16/20 2028    Specimen:  Blood Updated:  05/16/20 2131     Magnesium 1.6 mg/dL     Comprehensive Metabolic Panel [[id number]]  (Abnormal) Collected:  05/16/20 2028    Specimen:  Blood Updated:  05/16/20 2106     Glucose 723 mg/dL      BUN 27 mg/dL      Creatinine 1.49 mg/dL      Sodium 131 mmol/L      Potassium 3.7 mmol/L      Chloride 91 mmol/L      CO2 18.0 mmol/L      Calcium 8.4 mg/dL      Total Protein 5.8 g/dL      Albumin 2.20 g/dL      ALT (SGPT) 20 U/L      AST (SGOT) 12 U/L      Alkaline Phosphatase 203 U/L      Total Bilirubin 0.2 mg/dL      eGFR Non African Amer 37 mL/min/1.73      Globulin 3.6 gm/dL      A/G Ratio 0.6 g/dL      BUN/Creatinine Ratio 18.1     Anion Gap 22.0 mmol/L     Narrative:       GFR Normal >60  Chronic Kidney Disease <60  Kidney Failure <15      hCG, Serum, Qualitative [[id number]]  (Normal) Collected:  05/16/20 2029    Specimen:  Blood Updated:  05/16/20 2057     HCG Qualitative Negative    Hemoglobin A1c [[id number]]  (Abnormal) Collected:  05/16/20 2028    Specimen:  Blood Updated:  05/16/20 2057     Hemoglobin A1C 11.60 %     Narrative:       Hemoglobin A1C Ranges:    Increased Risk for Diabetes  5.7% to 6.4%  Diabetes                     >= 6.5%  Diabetic Goal                < 7.0%    Osmolality, Serum [[id number]]  (Abnormal) Collected:  05/16/20 2028    Specimen:  Blood Updated:  05/16/20 2055     Osmolality 320 mOsm/kg     CBC & Differential [[id number]] Collected:  05/16/20 2028    Specimen:  Blood Updated:  05/16/20 2044    Narrative:       The following orders were  created for panel order CBC & Differential.  Procedure                               Abnormality         Status                     ---------                               -----------         ------                     CBC Auto Differential[[id number]]        Abnormal            Final result                 Please view results for these tests on the individual orders.    CBC Auto Differential [[id number]]  (Abnormal) Collected:  05/16/20 2028    Specimen:  Blood Updated:  05/16/20 2044     WBC 11.77 10*3/mm3      RBC 3.12 10*6/mm3      Hemoglobin 9.0 g/dL      Hematocrit 28.5 %      MCV 91.3 fL      MCH 28.8 pg      MCHC 31.6 g/dL      RDW 15.5 %      RDW-SD 51.1 fl      MPV 10.9 fL      Platelets 199 10*3/mm3      Neutrophil % 82.7 %      Lymphocyte % 8.2 %      Monocyte % 6.8 %      Eosinophil % 0.1 %      Basophil % 0.3 %      Immature Grans % 1.9 %      Neutrophils, Absolute 9.74 10*3/mm3      Lymphocytes, Absolute 0.96 10*3/mm3      Monocytes, Absolute 0.80 10*3/mm3      Eosinophils, Absolute 0.01 10*3/mm3      Basophils, Absolute 0.04 10*3/mm3      Immature Grans, Absolute 0.22 10*3/mm3      nRBC 0.0 /100 WBC     Basic Metabolic Panel [[id number]]  (Abnormal) Collected:  05/16/20 1843    Specimen:  Blood Updated:  05/16/20 1959     Glucose 721 mg/dL      BUN 24 mg/dL      Creatinine 1.45 mg/dL      Sodium 132 mmol/L      Potassium 4.7 mmol/L      Chloride 90 mmol/L      CO2 20.0 mmol/L      Calcium 8.4 mg/dL      eGFR Non African Amer 38 mL/min/1.73      BUN/Creatinine Ratio 16.6     Anion Gap 22.0 mmol/L     Narrative:       GFR Normal >60  Chronic Kidney Disease <60  Kidney Failure <15      POC Glucose Once [[id number]]  (Abnormal) Collected:  05/14/20 1204    Specimen:  Blood Updated:  05/16/20 1103     Glucose 37 mg/dL      Comment: : [id number] [facility] REBECCAMeter ID: [id number]       POC Glucose Once [[id number]]  (Abnormal) Collected:  05/16/20 1045    Specimen:  Blood Updated:  05/16/20 1057      Glucose 306 mg/dL      Comment: RN NotifiedOperator: [id number] TYE Pastrana ID: [id number]       POC Glucose Once [[id number]]  (Abnormal) Collected:  05/16/20 0500    Specimen:  Blood Updated:  05/16/20 1051     Glucose 46 mg/dL      Comment: : [id number] CORNELL Nelson ID: [id number]       Basic Metabolic Panel [[id number]]  (Abnormal) Collected:  05/16/20 0812    Specimen:  Blood Updated:  05/16/20 0927     Glucose 173 mg/dL      BUN 14 mg/dL      Creatinine 1.07 mg/dL      Sodium 138 mmol/L      Potassium 3.1 mmol/L      Chloride 100 mmol/L      CO2 27.0 mmol/L      Calcium 8.2 mg/dL      eGFR Non African Amer 54 mL/min/1.73      BUN/Creatinine Ratio 13.1     Anion Gap 11.0 mmol/L     Narrative:       GFR Normal >60  Chronic Kidney Disease <60  Kidney Failure <15      CBC & Differential [[id number]] Collected:  05/16/20 0812    Specimen:  Blood Updated:  05/16/20 0907    Narrative:       The following orders were created for panel order CBC & Differential.  Procedure                               Abnormality         Status                     ---------                               -----------         ------                     CBC Auto Differential[[id number]]        Abnormal            Final result                 Please view results for these tests on the individual orders.    CBC Auto Differential [[id number]]  (Abnormal) Collected:  05/16/20 0812    Specimen:  Blood Updated:  05/16/20 0907     WBC 11.04 10*3/mm3      RBC 2.79 10*6/mm3      Hemoglobin 8.1 g/dL      Hematocrit 24.0 %      MCV 86.0 fL      MCH 29.0 pg      MCHC 33.8 g/dL      RDW 15.1 %      RDW-SD 47.4 fl      MPV 10.9 fL      Platelets 183 10*3/mm3      Neutrophil % 74.2 %      Lymphocyte % 14.4 %      Monocyte % 8.3 %      Eosinophil % 1.2 %      Basophil % 0.4 %      Immature Grans % 1.5 %      Neutrophils, Absolute 8.19 10*3/mm3      Lymphocytes, Absolute 1.59 10*3/mm3      Monocytes, Absolute 0.92 10*3/mm3       Eosinophils, Absolute 0.13 10*3/mm3      Basophils, Absolute 0.04 10*3/mm3      Immature Grans, Absolute 0.17 10*3/mm3      nRBC 0.0 /100 WBC     POC Glucose Once [[id number]]  (Normal) Collected:  05/16/20 0635    Specimen:  Blood Updated:  05/16/20 0647     Glucose 114 mg/dL      Comment: RN NotifiedOperator: [id number] TRAMAINE APRILMeter ID: [id number]       POC Glucose Once [[id number]]  (Normal) Collected:  05/16/20 0533    Specimen:  Blood Updated:  05/16/20 0547     Glucose 107 mg/dL      Comment: : [id number] CORNELL IRINANELLMeter ID: [id number]       POC Glucose Once [[id number]]  (Abnormal) Collected:  05/15/20 2036    Specimen:  Blood Updated:  05/16/20 0239     Glucose 266 mg/dL      Comment: RN NotifiedOperator: [id number] MICHELLE HARDINGAYLAMeter ID: [id number]       POC Glucose Once [[id number]]  (Abnormal) Collected:  05/15/20 2021    Specimen:  Blood Updated:  05/15/20 2321     Glucose 278 mg/dL      Comment: RN NotifiedOperator: [id number] EDUARD REGULOYMeter ID: [id number]       POC Glucose Once [[id number]]  (Abnormal) Collected:  05/15/20 1621    Specimen:  Blood Updated:  05/15/20 1648     Glucose 224 mg/dL      Comment: RN NotifiedOperator: [id number] MIGUEL DUGAN BMeter ID: [id number]       Phosphorus [[id number]]  (Normal) Collected:  05/15/20 1210    Specimen:  Blood Updated:  05/15/20 1233     Phosphorus 2.9 mg/dL     POC Glucose Once [[id number]]  (Abnormal) Collected:  05/15/20 1136    Specimen:  Blood Updated:  05/15/20 1151     Glucose 156 mg/dL      Comment: : [id number] ODONNELLWILLIAM LIANGMeter ID: [id number]       Basic Metabolic Panel [[id number]]  (Abnormal) Collected:  05/15/20 0520    Specimen:  Blood Updated:  05/15/20 0622     Glucose 295 mg/dL      BUN 20 mg/dL      Creatinine 1.28 mg/dL      Sodium 150 mmol/L      Potassium 3.6 mmol/L      Chloride 114 mmol/L      CO2 24.0 mmol/L      Calcium 8.6 mg/dL      eGFR Non African Amer 44 mL/min/1.73      BUN/Creatinine  Ratio 15.6     Anion Gap 12.0 mmol/L     Narrative:       GFR Normal >60  Chronic Kidney Disease <60  Kidney Failure <15      Phosphorus [[id number]]  (Normal) Collected:  05/15/20 0520    Specimen:  Blood Updated:  05/15/20 0622     Phosphorus 3.5 mg/dL     POC Glucose Once [[id number]]  (Abnormal) Collected:  05/15/20 0537    Specimen:  Blood Updated:  05/15/20 0606     Glucose 281 mg/dL      Comment: : [id number] [employer] KENDRAMeter ID: [id number]       POC Glucose Once [[id number]]  (Abnormal) Collected:  05/15/20 0305    Specimen:  Blood Updated:  05/15/20 0606     Glucose 336 mg/dL      Comment: : [id number] [employer] Immigration[employer] Pix[employer]RAMeter ID: [id number]       POC Glucose Once [[id number]]  (Abnormal) Collected:  05/14/20 1220    Specimen:  Blood Updated:  05/15/20 0533     Glucose 151 mg/dL      Comment: : [id number] [facility] REBECCAMeter ID: [id number]       Phosphorus [[id number]]  (Normal) Collected:  05/15/20 0115    Specimen:  Blood Updated:  05/15/20 0137     Phosphorus 3.3 mg/dL     POC Glucose Once [[id number]]  (Abnormal) Collected:  05/14/20 2350    Specimen:  Blood Updated:  05/15/20 0021     Glucose 324 mg/dL      Comment: : [id number] [employer] KENDRAMeter ID: [id number]       POC Glucose Once [[id number]]  (Abnormal) Collected:  05/14/20 2111    Specimen:  Blood Updated:  05/14/20 2203     Glucose 319 mg/dL      Comment: : [id number] [employer] KENDRAMeter ID: [id number]       POC Glucose Once [[id number]]  (Abnormal) Collected:  05/14/20 1752    Specimen:  Blood Updated:  05/14/20 1933     Glucose 184 mg/dL      Comment: Result Not ConfirmedOperator: [id number] LAZARO REBECCAMeter ID: [id number]       Phosphorus [[id number]]  (Normal) Collected:  05/14/20 1807    Specimen:  Blood Updated:  05/14/20 1824     Phosphorus 2.8 mg/dL     Phosphorus [[id number]]  (Normal) Collected:  05/14/20 1305    Specimen:  Blood Updated:  05/14/20 1404     Phosphorus 3.1 mg/dL     POC  Glucose Once [[id number]]  (Abnormal) Collected:  05/14/20 1304    Specimen:  Blood Updated:  05/14/20 1329     Glucose 140 mg/dL      Comment: Result Not ConfirmedOperator: [id number] [facility] REBECCAMeter ID: [id number]       Magnesium [[id number]]  (Normal) Collected:  05/14/20 0435    Specimen:  Blood Updated:  05/14/20 1055     Magnesium 1.6 mg/dL     POC Glucose Once [[id number]]  (Abnormal) Collected:  05/14/20 0257    Specimen:  Blood Updated:  05/14/20 0900     Glucose 43 mg/dL      Comment: Result Not ConfirmedOperator: [id number] SUSAN JEANLIRolandater ID: [id number]       POC Glucose Once [[id number]]  (Abnormal) Collected:  05/14/20 0253    Specimen:  Blood Updated:  05/14/20 0900     Glucose 42 mg/dL      Comment: : [id number] SUSAN LESLIRolandater ID: [id number]       POC Glucose Once [[id number]]  (Abnormal) Collected:  05/14/20 0210    Specimen:  Blood Updated:  05/14/20 0859     Glucose 36 mg/dL      Comment: : [id number] SUSAN LESLIEMeter ID: [id number]       POC Glucose Once [[id number]]  (Abnormal) Collected:  05/14/20 0208    Specimen:  Blood Updated:  05/14/20 0859     Glucose 37 mg/dL      Comment: : [id number] SUSANWILFRID Sanchezter ID: [id number]       Blood Culture - Blood, Arm, Left [[id number]]  (Abnormal) Collected:  05/11/20 2238    Specimen:  Blood from Arm, Left Updated:  05/14/20 0551     Blood Culture Escherichia coli     Isolated from Aerobic and Anaerobic Bottles     Gram Stain Aerobic Bottle Gram negative bacilli     Comment: 2 bottles of 4 drawn positive for gram negative bacilli         Anaerobic Bottle Gram negative bacilli     Comment: 3 bottles of 4 drawn positive for gram negative bacilli       Narrative:       Refer to previous blood culture collected on 5/11/20 for MICs    Blood Culture - Blood, Wrist, Right [[id number]]  (Abnormal)  (Susceptibility) Collected:  05/11/20 1918    Specimen:  Blood from Wrist, Right Updated:  05/14/20  0551     Blood Culture Escherichia coli     Isolated from Aerobic and Anaerobic Bottles     Gram Stain Aerobic Bottle Gram negative bacilli     Comment: 1  Bottle of 4 drawn positive for gram negative bacilli         Anaerobic Bottle Gram negative bacilli     Comment: 4 bottles of 4 drawn positive for gram negative bacilli       Susceptibility      Escherichia coli     MAHENDRA     Ampicillin Susceptible     Ampicillin + Sulbactam Susceptible     Cefepime Susceptible     Ceftazidime Susceptible     Ceftriaxone Susceptible     Gentamicin Susceptible     Levofloxacin Susceptible     Piperacillin + Tazobactam Susceptible     Trimethoprim + Sulfamethoxazole Susceptible                Susceptibility Comments     Escherichia coli    Cefazolin sensitivity will not be reported for Enterobacteriaceae in non-urine isolates. If cefazolin is preferred, please call the microbiology lab to request an E-test.  With the exception of urinary-sourced infections, aminoglycosides should not be used as monotherapy.             Basic Metabolic Panel [[id number]]  (Abnormal) Collected:  05/14/20 0435    Specimen:  Blood Updated:  05/14/20 0527     Glucose 116 mg/dL      BUN 20 mg/dL      Creatinine 1.57 mg/dL      Sodium 145 mmol/L      Potassium 3.8 mmol/L      Chloride 116 mmol/L      CO2 16.0 mmol/L      Calcium 8.3 mg/dL      eGFR Non African Amer 35 mL/min/1.73      BUN/Creatinine Ratio 12.7     Anion Gap 13.0 mmol/L     Narrative:       GFR Normal >60  Chronic Kidney Disease <60  Kidney Failure <15      Phosphorus [[id number]]  (Abnormal) Collected:  05/14/20 0435    Specimen:  Blood Updated:  05/14/20 0527     Phosphorus 1.7 mg/dL     CBC & Differential [[id number]] Collected:  05/14/20 0435    Specimen:  Blood Updated:  05/14/20 0507    Narrative:       The following orders were created for panel order CBC & Differential.  Procedure                               Abnormality         Status                     ---------                                -----------         ------                     CBC Auto Differential[[id number]]        Abnormal            Final result                 Please view results for these tests on the individual orders.    CBC Auto Differential [[id number]]  (Abnormal) Collected:  05/14/20 0435    Specimen:  Blood Updated:  05/14/20 0507     WBC 14.30 10*3/mm3      RBC 4.38 10*6/mm3      Hemoglobin 12.5 g/dL      Hematocrit 37.7 %      MCV 86.1 fL      MCH 28.5 pg      MCHC 33.2 g/dL      RDW 14.8 %      RDW-SD 46.4 fl      MPV 12.0 fL      Platelets 129 10*3/mm3      Neutrophil % 85.5 %      Lymphocyte % 7.6 %      Monocyte % 4.3 %      Eosinophil % 0.3 %      Basophil % 0.0 %      Immature Grans % 2.3 %      Neutrophils, Absolute 12.23 10*3/mm3      Lymphocytes, Absolute 1.08 10*3/mm3      Monocytes, Absolute 0.62 10*3/mm3      Eosinophils, Absolute 0.04 10*3/mm3      Basophils, Absolute 0.00 10*3/mm3      Immature Grans, Absolute 0.33 10*3/mm3      nRBC 0.0 /100 WBC     Urine Culture - Urine, Urine, Clean Catch [[id number]]  (Abnormal)  (Susceptibility) Collected:  05/11/20 2003    Specimen:  Urine, Clean Catch Updated:  05/14/20 0350     Urine Culture >100,000 CFU/mL Escherichia coli    Susceptibility      Escherichia coli     MAHENDRA     Ampicillin Susceptible     Ampicillin + Sulbactam Susceptible     Cefazolin Susceptible     Cefepime Susceptible     Ceftazidime Susceptible     Ceftriaxone Susceptible     Gentamicin Susceptible     Levofloxacin Susceptible     Nitrofurantoin Susceptible     Piperacillin + Tazobactam Susceptible     Tetracycline Susceptible     Trimethoprim + Sulfamethoxazole Susceptible                    POC Glucose Once [[id number]]  (Normal) Collected:  05/14/20 0334    Specimen:  Blood Updated:  05/14/20 0346     Glucose 125 mg/dL      Comment: : [id number] SUSAN Parker ID: [id number]       POC Glucose Once [[id number]]  (Normal) Collected:  05/14/20 0004    Specimen:  Blood Updated:   05/14/20 0015     Glucose 72 mg/dL      Comment: : [id number] SUSAN JEANLIRolandater ID: [id number]       POC Glucose Once [[id number]]  (Abnormal) Collected:  05/13/20 2006    Specimen:  Blood Updated:  05/13/20 2019     Glucose 180 mg/dL      Comment: : [id number] SUSAN Sanchezter ID: [id number]       POC Glucose Once [[id number]]  (Abnormal) Collected:  05/13/20 1656    Specimen:  Blood Updated:  05/13/20 1726     Glucose 255 mg/dL      Comment: : [id number] MAYNE MARIAHMeter ID: [id number]       Potassium [[id number]]  (Normal) Collected:  05/13/20 1611    Specimen:  Blood Updated:  05/13/20 1647     Potassium 4.4 mmol/L     Phosphorus [[id number]]  (Normal) Collected:  05/13/20 1611    Specimen:  Blood Updated:  05/13/20 1647     Phosphorus 2.6 mg/dL     POC Glucose Once [[id number]]  (Abnormal) Collected:  05/13/20 1056    Specimen:  Blood Updated:  05/13/20 1134     Glucose 222 mg/dL      Comment: : [id number] MAYEN MARIAHMeter ID: [id number]       POC Glucose Once [[id number]]  (Abnormal) Collected:  05/13/20 0951    Specimen:  Blood Updated:  05/13/20 1133     Glucose 183 mg/dL      Comment: : [id number] MAYEN MARIAHMeter ID: [id number]       POC Glucose Once [[id number]]  (Normal) Collected:  05/13/20 0851    Specimen:  Blood Updated:  05/13/20 1133     Glucose 97 mg/dL      Comment: : [id number] MAYEN MARIAHMeter ID: [id number]       POC Glucose Once [[id number]]  (Normal) Collected:  05/13/20 0803    Specimen:  Blood Updated:  05/13/20 1133     Glucose 123 mg/dL      Comment: : [id number] MAYEN MARIAHMeter ID: [id number]       POC Glucose Once [[id number]]  (Normal) Collected:  05/13/20 0654    Specimen:  Blood Updated:  05/13/20 1133     Glucose 103 mg/dL      Comment: : [id number] ALEJO Cooper ID: [id number]       Phosphorus [[id number]]  (Abnormal) Collected:  05/13/20 1014    Specimen:  Blood Updated:  05/13/20 1108      Phosphorus 1.9 mg/dL     Magnesium [[id number]]  (Normal) Collected:  05/13/20 1014    Specimen:  Blood Updated:  05/13/20 1051     Magnesium 1.6 mg/dL     Phosphorus [[id number]]  (Abnormal) Collected:  05/13/20 0558    Specimen:  Blood Updated:  05/13/20 0743     Phosphorus 1.7 mg/dL     Basic Metabolic Panel [[id number]]  (Abnormal) Collected:  05/13/20 0558    Specimen:  Blood Updated:  05/13/20 0720     Glucose 134 mg/dL      BUN 31 mg/dL      Creatinine 1.71 mg/dL      Sodium 140 mmol/L      Potassium 4.3 mmol/L      Chloride 115 mmol/L      CO2 12.0 mmol/L      Calcium 7.9 mg/dL      eGFR Non African Amer 32 mL/min/1.73      BUN/Creatinine Ratio 18.1     Anion Gap 13.0 mmol/L     Narrative:       GFR Normal >60  Chronic Kidney Disease <60  Kidney Failure <15      Magnesium [[id number]]  (Abnormal) Collected:  05/13/20 0558    Specimen:  Blood Updated:  05/13/20 0720     Magnesium 1.5 mg/dL     POC Glucose Once [[id number]]  (Normal) Collected:  05/13/20 0556    Specimen:  Blood Updated:  05/13/20 0615     Glucose 112 mg/dL      Comment: : [id number] ALEJO AffinityClickMattie ID: [id number]       POC Glucose Once [[id number]]  (Normal) Collected:  05/13/20 0455    Specimen:  Blood Updated:  05/13/20 0510     Glucose 117 mg/dL      Comment: : [id number] ALEJO AffinityClickRolandater ID: [id number]       POC Glucose Once [[id number]]  (Normal) Collected:  05/13/20 0354    Specimen:  Blood Updated:  05/13/20 0429     Glucose 112 mg/dL      Comment: : [id number] OpenRentSTEFANI AffinityClickRolandater ID: [id number]       Basic Metabolic Panel [[id number]]  (Abnormal) Collected:  05/13/20 0310    Specimen:  Blood Updated:  05/13/20 0416     Glucose 207 mg/dL      BUN 36 mg/dL      Creatinine 1.71 mg/dL      Sodium 138 mmol/L      Potassium 5.3 mmol/L      Chloride 113 mmol/L      CO2 9.0 mmol/L      Calcium 7.6 mg/dL      eGFR Non African Amer 32 mL/min/1.73      BUN/Creatinine Ratio 21.1     Anion Gap 16.0 mmol/L     Narrative:        GFR Normal >60  Chronic Kidney Disease <60  Kidney Failure <15      Magnesium [[id number]]  (Normal) Collected:  05/13/20 0310    Specimen:  Blood Updated:  05/13/20 0337     Magnesium 1.7 mg/dL     Phosphorus [[id number]]  (Abnormal) Collected:  05/13/20 0310    Specimen:  Blood Updated:  05/13/20 0337     Phosphorus 2.3 mg/dL     POC Glucose Once [[id number]]  (Abnormal) Collected:  05/13/20 0252    Specimen:  Blood Updated:  05/13/20 0305     Glucose 172 mg/dL      Comment: : [id number] AdzCentralSTEFANI [employer] Biomass[employer]eter ID: [id number]       POC Glucose Once [[id number]]  (Abnormal) Collected:  05/13/20 0144    Specimen:  Blood Updated:  05/13/20 0156     Glucose 161 mg/dL      Comment: : [id number] ALEJO [employer] ID: [id number]       POC Glucose Once [[id number]]  (Abnormal) Collected:  05/13/20 0045    Specimen:  Blood Updated:  05/13/20 0058     Glucose 225 mg/dL      Comment: : [id number] AdzCentralSTEFANI [employer]EMeter ID: [id number]       POC Glucose Once [[id number]]  (Abnormal) Collected:  05/12/20 2346    Specimen:  Blood Updated:  05/12/20 2357     Glucose 272 mg/dL      Comment: : [id number] AdzCentralSTEFANI [employer]eter ID: [id number]       POC Glucose Once [[id number]]  (Abnormal) Collected:  05/12/20 2239    Specimen:  Blood Updated:  05/12/20 2357     Glucose 242 mg/dL      Comment: Result Not ConfirmedOperator: [id number] ALEJO [employer] ID: [id number]       POC Glucose Once [[id number]]  (Abnormal) Collected:  05/12/20 2150    Specimen:  Blood Updated:  05/12/20 2357     Glucose 268 mg/dL      Comment: : [id number] AdzCentralSTEFANI [employer]eter ID: [id number]       Basic Metabolic Panel [[id number]]  (Abnormal) Collected:  05/12/20 2239    Specimen:  Blood Updated:  05/12/20 2307     Glucose 252 mg/dL      BUN 38 mg/dL      Creatinine 1.63 mg/dL      Sodium 142 mmol/L      Potassium 5.3 mmol/L      Chloride 115 mmol/L      CO2 12.0 mmol/L      Calcium 7.5 mg/dL      eGFR Non African Amer 33  mL/min/1.73      BUN/Creatinine Ratio 23.3     Anion Gap 15.0 mmol/L     Narrative:       GFR Normal >60  Chronic Kidney Disease <60  Kidney Failure <15      Magnesium [[id number]]  (Normal) Collected:  05/12/20 2239    Specimen:  Blood Updated:  05/12/20 2307     Magnesium 1.6 mg/dL     Phosphorus [[id number]]  (Normal) Collected:  05/12/20 2239    Specimen:  Blood Updated:  05/12/20 2307     Phosphorus 2.5 mg/dL     POC Glucose Once [[id number]]  (Abnormal) Collected:  05/12/20 2035    Specimen:  Blood Updated:  05/12/20 2048     Glucose 207 mg/dL      Comment: : [id number] ALEJO Cooper ID: [id number]       Phosphorus [[id number]]  (Normal) Collected:  05/12/20 1758    Specimen:  Blood Updated:  05/12/20 1827     Phosphorus 2.8 mg/dL     Basic Metabolic Panel [[id number]]  (Abnormal) Collected:  05/12/20 1758    Specimen:  Blood Updated:  05/12/20 1825     Glucose 216 mg/dL      BUN 41 mg/dL      Creatinine 1.69 mg/dL      Sodium 143 mmol/L      Potassium 4.8 mmol/L      Chloride 116 mmol/L      CO2 13.0 mmol/L      Calcium 7.3 mg/dL      eGFR Non African Amer 32 mL/min/1.73      BUN/Creatinine Ratio 24.3     Anion Gap 14.0 mmol/L     Narrative:       GFR Normal >60  Chronic Kidney Disease <60  Kidney Failure <15      Magnesium [[id number]]  (Normal) Collected:  05/12/20 1758    Specimen:  Blood Updated:  05/12/20 1825     Magnesium 1.6 mg/dL     POC Glucose Once [[id number]]  (Abnormal) Collected:  05/12/20 1757    Specimen:  Blood Updated:  05/12/20 1821     Glucose 199 mg/dL      Comment: : [id number] SAMY DENISEMeter ID: [id number]       POC Glucose Once [[id number]]  (Abnormal) Collected:  05/12/20 1700    Specimen:  Blood Updated:  05/12/20 1821     Glucose 203 mg/dL      Comment: : [id number] SAMY DENISEMeter ID: [id number]       POC Glucose Once [[id number]]  (Abnormal) Collected:  05/12/20 1606    Specimen:  Blood Updated:  05/12/20 1618     Glucose 164 mg/dL      Comment:  : [id number] SAMY DENISEMeter ID: [id number]       POC Glucose Once [[id number]]  (Abnormal) Collected:  05/12/20 1500    Specimen:  Blood Updated:  05/12/20 1513     Glucose 150 mg/dL      Comment: : [id number] SAMY DENISEMeter ID: [id number]       POC Glucose Once [[id number]]  (Abnormal) Collected:  05/12/20 1332    Specimen:  Blood Updated:  05/12/20 1513     Glucose 138 mg/dL      Comment: : [id number] MAYEN MARIAHMeter ID: [id number]       Phosphorus [[id number]]  (Abnormal) Collected:  05/12/20 1408    Specimen:  Blood Updated:  05/12/20 1433     Phosphorus 2.2 mg/dL     Basic Metabolic Panel [[id number]]  (Abnormal) Collected:  05/12/20 1408    Specimen:  Blood Updated:  05/12/20 1433     Glucose 169 mg/dL      BUN 44 mg/dL      Creatinine 1.68 mg/dL      Sodium 142 mmol/L      Potassium 5.2 mmol/L      Chloride 114 mmol/L      CO2 15.0 mmol/L      Calcium 7.5 mg/dL      eGFR Non African Amer 32 mL/min/1.73      BUN/Creatinine Ratio 26.2     Anion Gap 13.0 mmol/L     Narrative:       GFR Normal >60  Chronic Kidney Disease <60  Kidney Failure <15      Magnesium [[id number]]  (Normal) Collected:  05/12/20 1408    Specimen:  Blood Updated:  05/12/20 1432     Magnesium 1.7 mg/dL     POC Glucose Once [[id number]]  (Abnormal) Collected:  05/12/20 1210    Specimen:  Blood Updated:  05/12/20 1223     Glucose 136 mg/dL      Comment: : [id number] PABLO DENISEMeter ID: [id number]       POC Glucose Once [[id number]]  (Abnormal) Collected:  05/12/20 1052    Specimen:  Blood Updated:  05/12/20 1222     Glucose 185 mg/dL      Comment: : [id number] MAYEN MARIAHMeter ID: [id number]       POC Glucose Once [[id number]]  (Abnormal) Collected:  05/12/20 1002    Specimen:  Blood Updated:  05/12/20 1222     Glucose 217 mg/dL      Comment: : [id number] MAYEN MARIAHMeter ID: [id number]       Blood Culture ID, PCR - Blood, Wrist, Right [[id number]]  (Abnormal) Collected:   05/11/20 1918    Specimen:  Blood from Wrist, Right Updated:  05/12/20 1216     BCID, PCR Escherichia coli. Identification by BCID PCR.    Narrative:       Sensitivity to Follow    Phosphorus [[id number]]  (Abnormal) Collected:  05/12/20 1015    Specimen:  Blood Updated:  05/12/20 1113     Phosphorus 1.2 mg/dL     Basic Metabolic Panel [[id number]]  (Abnormal) Collected:  05/12/20 1015    Specimen:  Blood Updated:  05/12/20 1112     Glucose 230 mg/dL      BUN 45 mg/dL      Creatinine 1.71 mg/dL      Sodium 140 mmol/L      Potassium 3.9 mmol/L      Chloride 112 mmol/L      CO2 13.0 mmol/L      Calcium 7.4 mg/dL      eGFR Non African Amer 32 mL/min/1.73      BUN/Creatinine Ratio 26.3     Anion Gap 15.0 mmol/L     Narrative:       GFR Normal >60  Chronic Kidney Disease <60  Kidney Failure <15      Magnesium [[id number]]  (Normal) Collected:  05/12/20 1015    Specimen:  Blood Updated:  05/12/20 1112     Magnesium 1.6 mg/dL     POC Glucose Once [[id number]]  (Abnormal) Collected:  05/12/20 0915    Specimen:  Blood Updated:  05/12/20 1011     Glucose 258 mg/dL      Comment: : [id number] PernixDataeter ID: [id number]       POC Glucose Once [[id number]]  (Abnormal) Collected:  05/12/20 0752    Specimen:  Blood Updated:  05/12/20 1011     Glucose 305 mg/dL      Comment: : [id number] PernixDataeter ID: [id number]       POC Glucose Once [[id number]]  (Abnormal) Collected:  05/12/20 0555    Specimen:  Blood Updated:  05/12/20 0747     Glucose 411 mg/dL      Comment: RN NotifiedOperator: [id number] ROSALINDA LawyerPaidMeter ID: [id number]       POC Glucose Once [[id number]]  (Abnormal) Collected:  05/12/20 0436    Specimen:  Blood Updated:  05/12/20 0747     Glucose 430 mg/dL      Comment: RN NotifiedOperator: [id number] TELLEZ JASONMeter ID: [id number]       Comprehensive Metabolic Panel [[id number]]  (Abnormal) Collected:  05/12/20 0610    Specimen:  Blood Updated:  05/12/20 0656     Glucose 426 mg/dL      BUN 49  mg/dL      Creatinine 1.86 mg/dL      Sodium 134 mmol/L      Potassium 3.4 mmol/L      Chloride 104 mmol/L      CO2 13.0 mmol/L      Calcium 7.4 mg/dL      Total Protein 5.1 g/dL      Albumin 2.20 g/dL      ALT (SGPT) 41 U/L      AST (SGOT) 97 U/L      Alkaline Phosphatase 227 U/L      Total Bilirubin 0.2 mg/dL      eGFR Non African Amer 29 mL/min/1.73      Globulin 2.9 gm/dL      A/G Ratio 0.8 g/dL      BUN/Creatinine Ratio 26.3     Anion Gap 17.0 mmol/L     Narrative:       GFR Normal >60  Chronic Kidney Disease <60  Kidney Failure <15      Phosphorus [[id number]]  (Abnormal) Collected:  05/12/20 0610    Specimen:  Blood Updated:  05/12/20 0656     Phosphorus 1.6 mg/dL     TSH [[id number]]  (Normal) Collected:  05/12/20 0610    Specimen:  Blood Updated:  05/12/20 0649     TSH 1.220 uIU/mL      Comment: TSH results may be falsely decreased if patient taking Biotin.       Magnesium [[id number]]  (Normal) Collected:  05/12/20 0610    Specimen:  Blood Updated:  05/12/20 0644     Magnesium 1.6 mg/dL     CBC Auto Differential [[id number]]  (Abnormal) Collected:  05/12/20 0610    Specimen:  Blood Updated:  05/12/20 0620     WBC 5.05 10*3/mm3      RBC 3.26 10*6/mm3      Hemoglobin 9.7 g/dL      Hematocrit 28.3 %      MCV 86.8 fL      MCH 29.8 pg      MCHC 34.3 g/dL      RDW 13.4 %      RDW-SD 42.6 fl      MPV 9.7 fL      Platelets 194 10*3/mm3      Neutrophil % 78.2 %      Lymphocyte % 11.9 %      Monocyte % 8.3 %      Eosinophil % 0.0 %      Basophil % 0.4 %      Immature Grans % 1.2 %      Neutrophils, Absolute 3.95 10*3/mm3      Lymphocytes, Absolute 0.60 10*3/mm3      Monocytes, Absolute 0.42 10*3/mm3      Eosinophils, Absolute 0.00 10*3/mm3      Basophils, Absolute 0.02 10*3/mm3      Immature Grans, Absolute 0.06 10*3/mm3      nRBC 0.4 /100 WBC     Phosphorus [[id number]]  (Normal) Collected:  05/12/20 0241    Specimen:  Blood Updated:  05/12/20 0334     Phosphorus 2.6 mg/dL     Magnesium [[id number]]  (Normal) Collected:   05/12/20 0241    Specimen:  Blood Updated:  05/12/20 0334     Magnesium 1.7 mg/dL     Basic Metabolic Panel [[id number]]  (Abnormal) Collected:  05/12/20 0241    Specimen:  Blood Updated:  05/12/20 0333     Glucose 624 mg/dL      BUN 51 mg/dL      Creatinine 1.88 mg/dL      Sodium 130 mmol/L      Potassium 3.1 mmol/L      Chloride 99 mmol/L      CO2 10.0 mmol/L      Calcium 7.2 mg/dL      eGFR Non African Amer 28 mL/min/1.73      BUN/Creatinine Ratio 27.1     Anion Gap 21.0 mmol/L     Narrative:       GFR Normal >60  Chronic Kidney Disease <60  Kidney Failure <15      Extra Tubes [[id number]] Collected:  05/11/20 2248    Specimen:  Blood, Venous Line Updated:  05/12/20 0300    Narrative:       The following orders were created for panel order Extra Tubes.  Procedure                               Abnormality         Status                     ---------                               -----------         ------                     Mendoza Top[[id number]]                                         Final result                 Please view results for these tests on the individual orders.    Gray Top [[id number]] Collected:  05/11/20 2248    Specimen:  Blood Updated:  05/12/20 0300     Extra Tube Hold for add-ons.     Comment: Auto resulted.       Osmolality, Serum [[id number]]  (Abnormal) Collected:  05/11/20 2238    Specimen:  Blood from Arm, Left Updated:  05/11/20 2328     Osmolality 347 mOsm/kg     Blood Gas, Arterial [[id number]]  (Abnormal) Collected:  05/11/20 2315    Specimen:  Arterial Blood Updated:  05/11/20 2324     Site Right Radial     Daniel's Test N/A     pH, Arterial 7.052 pH units      Comment: 85 Value below critical limit        pCO2, Arterial 13.6 mm Hg      Comment: 85 Value below critical limit        pO2, Arterial 154.0 mm Hg      Comment: 83 Value above reference range        HCO3, Arterial 3.8 mmol/L      Comment: 84 Value below reference range        Base Excess, Arterial -24.7 mmol/L      Comment: 84 Value  below reference range        O2 Saturation, Arterial 99.4 %      Comment: 83 Value above reference range        Barometric Pressure for Blood Gas 753 mmHg      Modality Room Air     Ventilator Mode NA     Collected by CD     Comment: Meter: [id number]     :  [id number]       Comprehensive Metabolic Panel [[id number]]  (Abnormal) Collected:  05/11/20 2238    Specimen:  Blood from Arm, Left Updated:  05/11/20 2320     Glucose 1,192 mg/dL      BUN 57 mg/dL      Creatinine 2.35 mg/dL      Sodium 112 mmol/L      Potassium 5.2 mmol/L      Chloride 77 mmol/L      CO2 3.0 mmol/L      Calcium 8.3 mg/dL      Total Protein 6.1 g/dL      Albumin 2.50 g/dL      ALT (SGPT) 20 U/L      AST (SGOT) 43 U/L      Alkaline Phosphatase 280 U/L      Total Bilirubin 0.3 mg/dL      eGFR Non African Amer 22 mL/min/1.73      Globulin 3.6 gm/dL      A/G Ratio 0.7 g/dL      BUN/Creatinine Ratio 24.3     Anion Gap 32.0 mmol/L     Narrative:       GFR Normal >60  Chronic Kidney Disease <60  Kidney Failure <15      Lactic Acid, Reflex [[id number]]  (Normal) Collected:  05/11/20 2245    Specimen:  Blood Updated:  05/11/20 2313     Lactate 2.0 mmol/L     Phosphorus [[id number]]  (Abnormal) Collected:  05/11/20 2238    Specimen:  Blood from Arm, Left Updated:  05/11/20 2310     Phosphorus 6.9 mg/dL     Magnesium [[id number]]  (Normal) Collected:  05/11/20 2238    Specimen:  Blood from Arm, Left Updated:  05/11/20 2310     Magnesium 2.4 mg/dL     Troponin [[id number]]  (Normal) Collected:  05/11/20 2238    Specimen:  Blood from Arm, Left Updated:  05/11/20 2307     Troponin T <0.010 ng/mL     Narrative:       Troponin T Reference Range:  <= 0.03 ng/mL-   Negative for AMI  >0.03 ng/mL-     Abnormal for myocardial necrosis.  Clinicians would have to utilize clinical acumen, EKG, Troponin and serial changes to determine if it is an Acute Myocardial Infarction or myocardial injury due to an underlying chronic condition.       Results may be falsely  decreased if patient taking Biotin.      Hemoglobin A1c [[id number]]  (Abnormal) Collected:  05/11/20 2238    Specimen:  Blood from Arm, Left Updated:  05/11/20 2307     Hemoglobin A1C 11.50 %     Narrative:       Hemoglobin A1C Ranges:    Increased Risk for Diabetes  5.7% to 6.4%  Diabetes                     >= 6.5%  Diabetic Goal                < 7.0%    Lactic Acid, Reflex Timer (This will reflex a repeat order 3-3:15 hours after ordered.) [[id number]] Collected:  05/11/20 1918    Specimen:  Blood Updated:  05/11/20 2300     Hold Tube Hold for add-ons.     Comment: Auto resulted.       CBC & Differential [[id number]] Collected:  05/11/20 2238    Specimen:  Blood from Arm, Left Updated:  05/11/20 2256    Narrative:       The following orders were created for panel order CBC & Differential.  Procedure                               Abnormality         Status                     ---------                               -----------         ------                     CBC Auto Differential[[id number]]        Abnormal            Final result                 Please view results for these tests on the individual orders.    CBC Auto Differential [[id number]]  (Abnormal) Collected:  05/11/20 2238    Specimen:  Blood from Arm, Left Updated:  05/11/20 2256     WBC 19.62 10*3/mm3      RBC 3.60 10*6/mm3      Hemoglobin 10.8 g/dL      Hematocrit 37.4 %      .9 fL      MCH 30.0 pg      MCHC 28.9 g/dL      RDW 15.3 %      RDW-SD 59.0 fl      MPV 10.3 fL      Platelets 288 10*3/mm3      Neutrophil % 90.1 %      Lymphocyte % 7.5 %      Monocyte % 1.2 %      Eosinophil % 0.0 %      Basophil % 0.4 %      Immature Grans % 0.8 %      Neutrophils, Absolute 17.68 10*3/mm3      Lymphocytes, Absolute 1.48 10*3/mm3      Monocytes, Absolute 0.23 10*3/mm3      Eosinophils, Absolute 0.00 10*3/mm3      Basophils, Absolute 0.08 10*3/mm3      Immature Grans, Absolute 0.15 10*3/mm3      nRBC 0.7 /100 WBC     Ethanol [[id number]] Collected:   05/11/20 1803    Specimen:  Blood Updated:  05/11/20 2119     Ethanol <10 mg/dL      Ethanol % <0.010 %     Blood Gas, Arterial [[id number]]  (Abnormal) Collected:  05/11/20 2042    Specimen:  Arterial Blood Updated:  05/11/20 2052     Site Left Brachial     Daniel's Test N/A     pH, Arterial 6.828 pH units      Comment: 85 Value below critical limit        pCO2, Arterial 11.7 mm Hg      Comment: 85 Value below critical limit        pO2, Arterial 145.0 mm Hg      Comment: 83 Value above reference range        HCO3, Arterial 1.9 mmol/L      Comment: 84 Value below reference range        Base Excess, Arterial -30.6 mmol/L      Comment:  The parameter value has a question [id number] Base Excess out of range84 Value below reference range        O2 Saturation, Arterial 98.4 %      Barometric Pressure for Blood Gas 753 mmHg      Modality Room Air     FIO2 21 %      Ventilator Mode NA     Collected by PW     Comment: Meter: [id number]     :  [id number]       Urinalysis, Microscopic Only - Urine, Clean Catch [[id number]]  (Abnormal) Collected:  05/11/20 2003    Specimen:  Urine, Clean Catch Updated:  05/11/20 2030     RBC, UA 0-2 /HPF      WBC, UA 13-20 /HPF      Bacteria, UA Trace /HPF      Squamous Epithelial Cells, UA 3-5 /HPF      Hyaline Casts, UA 7-12 /LPF      Amorphous Crystals, UA Small/1+ /HPF      Methodology Manual Light Microscopy    Urine Drug Screen - Urine, Clean Catch [[id number]]  (Normal) Collected:  05/11/20 2003    Specimen:  Urine, Clean Catch Updated:  05/11/20 2023     THC, Screen, Urine Negative     Phencyclidine (PCP), Urine Negative     Cocaine Screen, Urine Negative     Methamphetamine, Ur Negative     Opiate Screen Negative     Amphetamine Screen, Urine Negative     Benzodiazepine Screen, Urine Negative     Tricyclic Antidepressants Screen Negative     Methadone Screen, Urine Negative     Barbiturates Screen, Urine Negative     Oxycodone Screen, Urine Negative     Propoxyphene Screen  Negative     Buprenorphine, Screen, Urine Negative    Narrative:       Cutoff For Drugs Screened:    Amphetamines               500 ng/ml  Barbiturates               200 ng/ml  Benzodiazepines            150 ng/ml  Cocaine                    150 ng/ml  Methadone                  200 ng/ml  Opiates                    100 ng/ml  Phencyclidine               25 ng/ml  THC                            50 ng/ml  Methamphetamine            500 ng/ml  Tricyclic Antidepressants  300 ng/ml  Oxycodone                  100 ng/ml  Propoxyphene               300 ng/ml  Buprenorphine               10 ng/ml    The normal value for all drugs tested is negative. This report includes unconfirmed screening results, with the cutoff values listed, to be used for medical treatment purposes only.  Unconfirmed results must not be used for non-medical purposes such as employment or legal testing.  Clinical consideration should be applied to any drug of abuse test, particularly when unconfirmed results are used.      Urinalysis With Microscopic If Indicated (No Culture) - Urine, Clean Catch [[id number]]  (Abnormal) Collected:  05/11/20 2003    Specimen:  Urine, Clean Catch Updated:  05/11/20 2019     Color, UA Yellow     Appearance, UA Cloudy     pH, UA <=5.0     Specific Gravity, UA 1.020     Glucose, UA >=1000 mg/dL (3+)     Ketones, UA 80 mg/dL (3+)     Bilirubin, UA Negative     Blood, UA Moderate (2+)     Protein, UA 30 mg/dL (1+)     Leuk Esterase, UA Negative     Nitrite, UA Negative     Urobilinogen, UA 0.2 E.U./dL    hCG, Serum, Qualitative [[id number]]  (Normal) Collected:  05/11/20 1939    Specimen:  Blood Updated:  05/11/20 1947     HCG Qualitative Negative    Lactic Acid, Plasma [[id number]]  (Abnormal) Collected:  05/11/20 1918    Specimen:  Blood Updated:  05/11/20 1946     Lactate 3.8 mmol/L     Extra Tubes [[id number]] Collected:  05/11/20 1812    Specimen:  Blood, Venous Line Updated:  05/11/20 1915    Narrative:       The  following orders were created for panel order Extra Tubes.  Procedure                               Abnormality         Status                     ---------                               -----------         ------                     Light Blue Top[[id number]]                                   Final result                 Please view results for these tests on the individual orders.    Light Blue Top [[id number]] Collected:  05/11/20 1812    Specimen:  Blood Updated:  05/11/20 1915     Extra Tube hold for add-on     Comment: Auto resulted       Comprehensive Metabolic Panel [[id number]]  (Abnormal) Collected:  05/11/20 1803    Specimen:  Blood Updated:  05/11/20 1851     Glucose 1,124 mg/dL      BUN 49 mg/dL      Creatinine 2.03 mg/dL      Sodium 114 mmol/L      Potassium 6.4 mmol/L      Chloride 76 mmol/L      CO2 3.0 mmol/L      Calcium 8.6 mg/dL      Total Protein 6.4 g/dL      Albumin 2.70 g/dL      ALT (SGPT) 12 U/L      AST (SGOT) 20 U/L      Comment: Specimen hemolyzed.  Results may be affected.        Alkaline Phosphatase 268 U/L      Total Bilirubin 0.3 mg/dL      eGFR Non African Amer 26 mL/min/1.73      Globulin 3.7 gm/dL      A/G Ratio 0.7 g/dL      BUN/Creatinine Ratio 24.1     Anion Gap 35.0 mmol/L     Narrative:       GFR Normal >60  Chronic Kidney Disease <60  Kidney Failure <15      Manual Differential [[id number]]  (Abnormal) Collected:  05/11/20 1803    Specimen:  Blood Updated:  05/11/20 1842     Neutrophil % 91.0 %      Lymphocyte % 1.0 %      Monocyte % 5.0 %      Bands %  2.0 %      Metamyelocyte % 1.0 %      Neutrophils Absolute 23.83 10*3/mm3      Lymphocytes Absolute 0.26 10*3/mm3      Monocytes Absolute 1.28 10*3/mm3      Dacrocytes Slight/1+     WBC Morphology Normal     Platelet Morphology Normal    Magnesium [[id number]]  (Normal) Collected:  05/11/20 1803    Specimen:  Blood Updated:  05/11/20 1835     Magnesium 2.6 mg/dL     Lipase [[id number]]  (Abnormal) Collected:  05/11/20 1803     Specimen:  Blood Updated:  05/11/20 1835     Lipase 12 U/L     Acetone [[id number]]  (Abnormal) Collected:  05/11/20 1803    Specimen:  Blood Updated:  05/11/20 1828     Acetone Small    CBC & Differential [[id number]] Collected:  05/11/20 1803    Specimen:  Blood Updated:  05/11/20 1820    Narrative:       The following orders were created for panel order CBC & Differential.  Procedure                               Abnormality         Status                     ---------                               -----------         ------                     CBC Auto Differential[[id number]]        Abnormal            Final result                 Please view results for these tests on the individual orders.    CBC Auto Differential [[id number]]  (Abnormal) Collected:  05/11/20 1803    Specimen:  Blood Updated:  05/11/20 1820     WBC 25.62 10*3/mm3      RBC 3.57 10*6/mm3      Hemoglobin 10.9 g/dL      Hematocrit 38.9 %      .0 fL      MCH 30.5 pg      MCHC 28.0 g/dL      RDW 15.4 %      RDW-SD 63.2 fl      MPV 10.3 fL      Platelets 336 10*3/mm3         Imaging Results (Last 7 Days)     Procedure Component Value Units Date/Time    US Guided Vascular Access [[id number]] Resulted:  05/14/20 1313     Updated:  05/14/20 1313    Narrative:       This procedure was auto-finalized with no dictation required.    IR Insert Midline Without Port Pump 5 Plus [[id number]] Resulted:  05/14/20 1256     Updated:  05/14/20 1256    Narrative:       This procedure was auto-finalized with no dictation required.    CT Abdomen Pelvis Without Contrast [[id number]] Collected:  05/11/20 2007     Updated:  05/11/20 2035    Narrative:         CT Abdomen and Pelvis Without Contrast    History: Abdominal pain    Axials spiral scans of the abdomen and pelvis were obtained  without contrast.  Coronal and sagital reconstructions were  preformed.      This exam was performed according to our departmental  dose-optimization program, which includes automated  exposure  control, adjustment of the mA and/or kV according to patient size  and/or use of iterative reconstruction technique.    DLP: 318.10    Comparison: None    Findings:   Scans of the lung bases demonstrate old granulomatous disease.    No acute osseous abnormality.  Degenerative changes and degenerative disc disease in the lumbar  spine.    The liver is unremarkable.  Cholecystectomy.  Splenic granulomata.  The pancreas is unremarkable.  The adrenal glands are unremarkable.    No renal or ureteral calculi.  No renal mass.  No hydronephrosis.    Unremarkable bladder.  No pelvic mass.  Hysterectomy.    No abdominal aortic aneurysm.  No adenopathy.    Moderate amount of fluid distending the stomach.  Minimally distended fluid-filled distal esophagus, question  gastroesophageal reflux.  Moderate amount retained feces in the colon.  Diverticulosis of the colon.  No bowel obstruction.  Normal appendix.  No free air.  No free fluid.    No hernia.      Impression:       Conclusion:  Moderate amount of fluid distending the stomach.  Minimally distended fluid-filled distal esophagus, question  gastroesophageal reflux.  Moderate amount retained feces in the colon.  Diverticulosis of the colon.  Cholecystectomy.  Hysterectomy.    [id number]    Electronically signed by:  Michelet Lawrence MD  5/11/2020 8:34 PM CDT  Workstation: [email or web address] Chest 1 View [[id number]] Collected:  05/11/20 1842     Updated:  05/11/20 1952    Narrative:         PORTABLE CHEST    HISTORY: Shortness of breath    Portable AP upright film of the chest was obtained at 7:29 PM.  COMPARISON: None    FINDINGS:   EKG leads.  The lungs are clear of an acute process.  Old granulomatous disease is present.  The heart is not enlarged.  The pulmonary vasculature is not increased.  No pleural effusion.  No pneumothorax.  No acute osseous abnormality.  Surgical clips right upper quadrant of the abdomen.      Impression:       CONCLUSION:  No Acute  "Disease    [id number]    Electronically signed by:  Michelet Lawrence MD  5/11/2020 7:51 PM CDT  Workstation: [email or web address]            Chief Complaint on Day of Discharge: None.    Hospital Course:  The patient was admitted for DKA (complicated by acute metabolic encephalopathy), acute kidney injury, sepsis secondary to acute cystitis and commenced on DKA protocol, IV antibiotics, IV hydration, bicarbonate infusion and also received repletion secondary to hypokalemia, hypophosphatemia and hypomagnesemia.  Blood and urine cultures came back positive for E. coli and repeat blood culture showed no growth.  Creatinine was back to normal prior to discharge.  DKA did resolve and patient was commenced on diabetic diet.  Her glycemic control was adequate prior to discharge and she did complete a course of antimicrobial therapy.  Patient was also placed on nicotine patch secondary to nicotine dependence and did receive counseling.    She also benefited from PT and OT secondary to deconditioning and will be scheduled to see  as outpatient in 2 days post discharge.        Condition on Discharge: Stable and improved.    Physical Exam on Discharge:  /71   Pulse 101   Temp 97.5 °F (36.4 °C) (Temporal)   Resp 18   Ht 167.6 cm (66\")   Wt 68.7 kg (151 lb 7.3 oz)   SpO2 96%   BMI 24.45 kg/m²   Physical Exam   Constitutional: She is oriented to person, place, and time. She appears well-developed and well-nourished. She is cooperative. No distress.   HENT:   Head: Normocephalic and atraumatic.   Right Ear: External ear normal.   Left Ear: External ear normal.   Nose: Nose normal.   Mouth/Throat: Oropharynx is clear and moist.   Eyes: Pupils are equal, round, and reactive to light. Conjunctivae and EOM are normal.   Neck: Normal range of motion. Neck supple. No JVD present. No thyromegaly present.   Cardiovascular: Normal rate, regular rhythm, normal heart sounds and intact distal pulses. Exam reveals no gallop and no " friction rub.   No murmur heard.  Pulmonary/Chest: Effort normal and breath sounds normal. No stridor. No respiratory distress. She has no wheezes. She has no rales. She exhibits no tenderness.   Abdominal: Soft. Bowel sounds are normal. She exhibits no distension and no mass. There is no tenderness. There is no rebound and no guarding. No hernia.   Musculoskeletal: Normal range of motion. She exhibits no edema, tenderness or deformity.   Neurological: She is alert and oriented to person, place, and time. She has normal reflexes. She displays normal reflexes. No cranial nerve deficit or sensory deficit. She exhibits normal muscle tone. Coordination normal.   Skin: Skin is warm and dry. No rash noted. She is not diaphoretic. No erythema. No pallor.   Psychiatric: She has a normal mood and affect. Her behavior is normal. Judgment and thought content normal.   Nursing note and vitals reviewed.        Discharge Disposition:  Home or Self Care    Discharge Medications:     Discharge Medications      Continue These Medications      Instructions Start Date   albuterol sulfate  (90 Base) MCG/ACT inhaler  Commonly known as:  PROVENTIL HFA;VENTOLIN HFA;PROAIR HFA   1 puff, Inhalation, Every 6 Hours PRN      busPIRone 10 MG tablet  Commonly known as:  BUSPAR   10 mg, Oral      fluticasone 50 MCG/ACT nasal spray  Commonly known as:  FLONASE   1 spray, Nasal, Daily      FreeStyle Janis 14 Day Sensor misc   CHANGE EVERY 14 DAYS      gabapentin 800 MG tablet  Commonly known as:  NEURONTIN   800 mg, Oral, 3 Times Daily      Insulin Glargine 100 UNIT/ML injection pen  Commonly known as:  BASAGLAR KWIKPEN   40 Units, Subcutaneous, Daily      Insulin Lispro (1 Unit Dial) 100 UNIT/ML solution pen-injector  Commonly known as:  HUMALOG   Inject 7 units plus sliding scale into the skin TID with meals      Insulin Pen Needle 31G X 8 MM misc   use as directed      ipratropium-albuterol 0.5-2.5 mg/3 ml nebulizer  Commonly known as:   "DUO-NEB   No dose, route, or frequency recorded.      Lancets misc   No dose, route, or frequency recorded.      levocetirizine 5 MG tablet  Commonly known as:  XYZAL   5 mg, Oral, Daily      omeprazole 20 MG capsule  Commonly known as:  priLOSEC   20 mg, Oral, Daily      OneTouch Verio test strip  Generic drug:  glucose blood   1 strip, 3 Times Daily      pramipexole 0.5 MG tablet  Commonly known as:  MIRAPEX   1 mg, Oral, Nightly      simvastatin 20 MG tablet  Commonly known as:  ZOCOR   20 mg, Oral, Daily      SITagliptin 50 MG tablet  Commonly known as:  JANUVIA   50 mg, Oral, Daily      traZODone 100 MG tablet  Commonly known as:  DESYREL   100 mg, Oral      UltiCare Insulin Syringe 30G X 1/2\" 0.3 ML misc  Generic drug:  Insulin Syringe-Needle U-100   No dose, route, or frequency recorded.      venlafaxine XR 75 MG 24 hr capsule  Commonly known as:  EFFEXOR-XR   75 mg, Oral, Daily         Stop These Medications    estradiol 2 MG tablet  Commonly known as:  ESTRACE     ibuprofen 800 MG tablet  Commonly known as:  ADVIL,MOTRIN            Discharge Diet: Heart healthy and diabetic.    Activity at Discharge: As tolerated.    Discharge Care Plan/Instructions: Patient has been advised to take her medications as prescribed and to return to the emergency room in the event of any worsening symptoms.    Follow-up Appointments:   Future Appointments   Date Time Provider Department Center   5/20/2020  2:45 PM Matt Mahmood MD [facility] None       Test Results Pending at Discharge:    Order Current Status    Blood Culture - Blood, Arm, Left Preliminary result          Misha Hart MD  05/18/20  10:07    Time: 35 minutes.              "

## 2020-05-18 NOTE — CONSULTS
"Adult Nutrition  Assessment    Patient Name:  Ingrid Ortiz  YOB: 1970  MRN: [id number]  Admit Date:  5/11/2020    Assessment Date:  5/18/2020    Comments:  Pt is alert, much improved condition.  She was transferred back to CCU due to another bout of DKA which has resolved.  She is going home.  She is alert and talking. She reports that she tries to follow her diet at home.  She is always hungry.  WE discussed that uncontrolled Diabetes can cause her too feel hungry all the time.  Her siblings have Diabetes so she has been familiar with it for a very long time.  She has a continuous blood sugar monitor and states that her blood sugar will go from low to high quickly.  WE discussed some Guidelines for diabetes.  Diet copies and contact number provided.  She plans to speak to her doctor about a pump in the very near future.      Reason for Assessment     Row Name 05/18/20 1115          Reason for Assessment    Reason For Assessment  follow-up protocol         Nutrition/Diet History     Row Name 05/18/20 1115          Nutrition/Diet History    Typical Food/Fluid Intake  Pt is alert and talking.  She is being discharged today.  She reports that she is a \"very brittle diabetic\"  She states that she has a continous Glucose monitor.  She takes long acting insulin and short acting.  She also takes a pill.  She said that she doesn't follow her diet close \"but I don't eat that bad\"          Anthropometrics     Row Name 05/18/20 0551          Anthropometrics    Weight  68.7 kg (151 lb 7.3 oz)         Labs/Tests/Procedures/Meds     Row Name 05/18/20 1117          Labs/Procedures/Meds    Lab Results Reviewed  reviewed, pertinent     Lab Results Comments  FSBS:  504/128/158/181/120        Diagnostic Tests/Procedures    Diagnostic Test/Procedure Reviewed  reviewed, pertinent        Medications    Pertinent Medications Reviewed  reviewed, pertinent             Nutrition Prescription Ordered     Row Name " 05/18/20 1117          Nutrition Prescription PO    Current PO Diet  Regular     Fluid Consistency  Thin     Supplement  Milk     Supplement Frequency  3 times a day     Common Modifiers  Consistent Carbohydrate;Cardiac         Evaluation of Received Nutrient/Fluid Intake     Row Name 05/18/20 1118          PO Evaluation    Number of Days PO Intake Evaluated  Insufficient Data     Number of Meals  2     % PO Intake  100%               Electronically signed by:  Lalitha Plasencia RD  05/18/20 11:24

## 2020-05-18 NOTE — PLAN OF CARE
Problem: Patient Care Overview  Goal: Plan of Care Review  Outcome: Ongoing (interventions implemented as appropriate)  Flowsheets (Taken 5/18/2020 1152)  Progress: improving  Plan of Care Reviewed With: patient  Outcome Summary: Pt awaiting dicharge orders, she is agreeable for gt and bed mobility, however, declined therex. Pt t/f sup-sit-sup Ind, sit-stand-sit Ind. Pt amb 416' with no AD Ind. 1 goal met this tx.

## 2020-05-19 ENCOUNTER — READMISSION MANAGEMENT (OUTPATIENT)
Dept: CALL CENTER | Facility: HOSPITAL | Age: 50
End: 2020-05-19

## 2020-05-19 LAB — GLUCOSE BLDC GLUCOMTR-MCNC: 522 MG/DL (ref 70–130)

## 2020-05-19 NOTE — OUTREACH NOTE
Prep Survey      Responses   [facility] facility patient discharged from?  [place]   Is LACE score < 7 ?  No   Eligibility  Readm Mgmt   Discharge diagnosis  DKA, Hypothermia, ARF, medical noncompliance, HLD, tobacco abuse, hyponatremia, dehydration, leukocytosis, sepsis, UTI, GERD acute metabolic encephalopathy   COVID-19 Test Status  Not tested   Does the patient have one of the following disease processes/diagnoses(primary or secondary)?  Sepsis   Does the patient have Home health ordered?  No   Is there a DME ordered?  No   Comments regarding appointments  Pt to schedule F/U with PCP   Prep survey completed?  Yes          Lissa Miramontes RN

## 2020-05-19 NOTE — PAYOR COMM NOTE
"Sophy Cannon  [facility]  P :[phone]  F: [phone]    [facility]#[id number]    Ingrid Ortiz (49 y.o. Female)     Date of Birth Social Security Number Address Home Phone MRN    1970  [address] [place] [address] [phone] [id number]    Catholic Marital Status          Protestant        Admission Date Admission Type Admitting Provider Attending Provider Department, Room/Bed    5/11/20 Emergency Noel Zamudio MD  [facility] CRITICAL CARE STEPDOWN, 05/A    Discharge Date Discharge Disposition Discharge Destination        5/18/2020 Home or Self Care              Attending Provider:  (none)   Allergies:  Codeine, Hydrocodone-acetaminophen, Morphine    Isolation:  None   Infection:  None   Code Status:  Prior    Ht:  167.6 cm (66\")   Wt:  68.7 kg (151 lb 7.3 oz)    Admission Cmt:  None   Principal Problem:  Diabetic ketoacidosis without coma associated with diabetes mellitus due to underlying condition (CMS/[facility]) [E08.10]                 Active Insurance as of 5/11/2020     Primary Coverage     Payor Plan Insurance Group Employer/Plan Group    [employer] [place] [employer]ICA[place]      Payor Plan Address Payor Plan Phone Number Payor Plan Fax Number Effective Dates    [address] [address] [phone]  5/11/2020 - None Entered    [facility] [id number]       Subscriber Name Subscriber Birth Date Member ID       INGRID ORTIZ 1970 [id number]                 Emergency Contacts      (Rel.) Home Phone Work Phone Mobile Phone    TU PORTER (Mother) [phone] -- [phone]               Discharge Summary      Misha Hart MD [facility] [id number]              [facility]  DISCHARGE SUMMARY       Date of Admission: 5/11/2020  Date of Discharge:  5/18/2020  Primary Care Physician: Provider, No Known    Presenting Problem/History of Present Illness:  Hypothermia, initial encounter " [T68.XXXA]  Diabetic ketoacidosis without coma associated with diabetes mellitus due to underlying condition (CMS/[facility]) [E08.10]  Acute renal failure, unspecified acute renal failure type (CMS/[facility]) [N17.9]  DKA (diabetic ketoacidoses) (CMS/[facility]) [E11.10]   The patient is a 49-year-old white female with history of diabetes mellitus, GERD, hyperlipidemia, anxiety, tobacco abuse and noncompliance who was admitted with history of feeling extremely weak and with some confusion.  The patient was evaluated in the ER noted to have very elevated blood sugar of over thousand with diabetic ketoacidosis.  She appeared very dehydrated and her creatinine was elevated at 2.  White count was elevated at 25,000 and she was hypothermic on admission requiring Jarrod hugger to warm her up.  The patient was also hyponatremic with sodium of 114.  The patient was given some fluid boluses and some insulin and cultures were taken and she was started on broad-spectrum antibiotics in the ER.  Urinalysis showed changes suggesting possible UTI.       Final Discharge Diagnoses:  Active Hospital Problems    Diagnosis   • **Diabetic ketoacidosis without coma associated with diabetes mellitus due to underlying condition (CMS/[facility])   • DARRELL (acute kidney injury) (CMS/[facility])   • Metabolic acidosis   • HLD (hyperlipidemia)   • Tobacco abuse   • Hyponatremia   • Dehydration   • Hypothermia   • Leukocytosis   • Sepsis (CMS/[facility])   • UTI (urinary tract infection)   • Anxiety   • Gastroesophageal reflux disease   • Acute metabolic encephalopathy       Consults:   Consults     No orders found from 4/12/2020 to 5/12/2020.          Procedures Performed: None.                Pertinent Test Results:   Lab Results (last 7 days)     Procedure Component Value Units Date/Time    Phosphorus [[id number]]  (Normal) Collected:  05/18/20 0813    Specimen:  Blood Updated:  05/18/20 0831     Phosphorus 3.4 mg/dL     Magnesium [[id number]]  (Normal) Collected:  05/18/20 0813     Specimen:  Blood Updated:  05/18/20 0831     Magnesium 1.6 mg/dL     Extra Tubes [[id number]] Collected:  05/18/20 0340    Specimen:  Blood, Venous Line Updated:  05/18/20 0445    Narrative:       The following orders were created for panel order Extra Tubes.  Procedure                               Abnormality         Status                     ---------                               -----------         ------                     Lavender Top[[id number]]                                     Final result                 Please view results for these tests on the individual orders.    Lavender Top [[id number]] Collected:  05/18/20 0340    Specimen:  Blood Updated:  05/18/20 0445     Extra Tube hold for add-on     Comment: Auto resulted       Basic Metabolic Panel [[id number]]  (Abnormal) Collected:  05/18/20 0340    Specimen:  Blood Updated:  05/18/20 0404     Glucose 120 mg/dL      BUN 15 mg/dL      Creatinine 1.16 mg/dL      Sodium 140 mmol/L      Potassium 3.2 mmol/L      Chloride 104 mmol/L      CO2 27.0 mmol/L      Calcium 8.2 mg/dL      eGFR Non African Amer 49 mL/min/1.73      BUN/Creatinine Ratio 12.9     Anion Gap 9.0 mmol/L     Narrative:       GFR Normal >60  Chronic Kidney Disease <60  Kidney Failure <15      Magnesium [[id number]]  (Abnormal) Collected:  05/18/20 0340    Specimen:  Blood Updated:  05/18/20 0404     Magnesium 1.5 mg/dL     Phosphorus [[id number]]  (Normal) Collected:  05/18/20 0340    Specimen:  Blood Updated:  05/18/20 0404     Phosphorus 2.9 mg/dL     Magnesium [[id number]]  (Abnormal) Collected:  05/18/20 0000    Specimen:  Blood Updated:  05/18/20 0019     Magnesium 1.4 mg/dL     Phosphorus [[id number]]  (Normal) Collected:  05/18/20 0000    Specimen:  Blood Updated:  05/18/20 0019     Phosphorus 3.0 mg/dL     POC Glucose Once [[id number]]  (Abnormal) Collected:  05/17/20 2013    Specimen:  Blood Updated:  05/17/20 2118     Glucose 181 mg/dL      Comment: : [id number] DAMIR  Terrance ID: [id number]       Magnesium [[id number]]  (Abnormal) Collected:  05/17/20 1944    Specimen:  Blood Updated:  05/17/20 2018     Magnesium 1.5 mg/dL     Phosphorus [[id number]]  (Normal) Collected:  05/17/20 1944    Specimen:  Blood Updated:  05/17/20 2018     Phosphorus 2.9 mg/dL     POC Glucose Once [[id number]]  (Abnormal) Collected:  05/17/20 1823    Specimen:  Blood Updated:  05/17/20 1834     Glucose 199 mg/dL      Comment: : [id number] GUANAKO CHADWICKFERMeter ID: [id number]       POC Glucose Once [[id number]]  (Abnormal) Collected:  05/17/20 1715    Specimen:  Blood Updated:  05/17/20 1735     Glucose 165 mg/dL      Comment: : [id number] GUANAKO CHADWICKFERMeter ID: [id number]       Magnesium [[id number]]  (Normal) Collected:  05/17/20 1557    Specimen:  Blood Updated:  05/17/20 1618     Magnesium 1.6 mg/dL     Phosphorus [[id number]]  (Abnormal) Collected:  05/17/20 1557    Specimen:  Blood Updated:  05/17/20 1618     Phosphorus 2.4 mg/dL     POC Glucose Once [[id number]]  (Abnormal) Collected:  05/17/20 1449    Specimen:  Blood Updated:  05/17/20 1501     Glucose 158 mg/dL      Comment: Result Not ConfirmedOperator: [id number] GUANAKO CHADWICKFERMeter ID: [id number]       POC Glucose Once [[id number]]  (Abnormal) Collected:  05/16/20 1643    Specimen:  Blood Updated:  05/17/20 1404     Glucose 522 mg/dL      Comment: RN NotifiedOperator: [id number] GAMBLE Victoriano ID: [id number]       POC Glucose Once [[id number]]  (Abnormal) Collected:  05/17/20 1208    Specimen:  Blood Updated:  05/17/20 1224     Glucose 313 mg/dL      Comment: : [id number] GUANAKO CHADWICKFERMeter ID: [id number]       Blood Culture - Blood, Arm, Left [[id number]] Collected:  05/15/20 1210    Specimen:  Blood from Arm, Left Updated:  05/17/20 1215     Blood Culture No growth at 2 days    Extra Tubes [[id number]] Collected:  05/17/20 1001    Specimen:  Blood, Venous Line Updated:  05/17/20 1115    Narrative:        The following orders were created for panel order Extra Tubes.  Procedure                               Abnormality         Status                     ---------                               -----------         ------                     Lavender Top[[id number]]                                     Final result                 Please view results for these tests on the individual orders.    Lavender Top [[id number]] Collected:  05/17/20 1001    Specimen:  Blood Updated:  05/17/20 1115     Extra Tube hold for add-on     Comment: Auto resulted       Magnesium [[id number]]  (Abnormal) Collected:  05/17/20 1001    Specimen:  Blood Updated:  05/17/20 1035     Magnesium 1.4 mg/dL     Phosphorus [[id number]]  (Normal) Collected:  05/17/20 1001    Specimen:  Blood Updated:  05/17/20 1035     Phosphorus 3.0 mg/dL     POC Glucose Once [[id number]]  (Normal) Collected:  05/17/20 0616    Specimen:  Blood Updated:  05/17/20 0627     Glucose 128 mg/dL      Comment: : [id number] DAMIR CYNTHIAMeter ID: [id number]       POC Glucose Once [[id number]]  (Normal) Collected:  05/17/20 0403    Specimen:  Blood Updated:  05/17/20 0627     Glucose 112 mg/dL      Comment: : [id number] DAMIR CYNTHIAMeter ID: [id number]       Basic Metabolic Panel [[id number]]  (Abnormal) Collected:  05/17/20 0344    Specimen:  Blood Updated:  05/17/20 0446     Glucose 118 mg/dL      BUN 22 mg/dL      Creatinine 1.16 mg/dL      Sodium 141 mmol/L      Potassium 3.5 mmol/L      Chloride 106 mmol/L      CO2 25.0 mmol/L      Calcium 7.9 mg/dL      eGFR Non African Amer 49 mL/min/1.73      BUN/Creatinine Ratio 19.0     Anion Gap 10.0 mmol/L     Narrative:       GFR Normal >60  Chronic Kidney Disease <60  Kidney Failure <15      Magnesium [[id number]]  (Abnormal) Collected:  05/17/20 0344    Specimen:  Blood Updated:  05/17/20 0446     Magnesium 1.5 mg/dL     Phosphorus [[id number]]  (Normal) Collected:  05/17/20 0344    Specimen:  Blood Updated:  05/17/20  0446     Phosphorus 3.4 mg/dL     POC Glucose Once [[id number]]  (Normal) Collected:  05/17/20 0310    Specimen:  Blood Updated:  05/17/20 0336     Glucose 100 mg/dL      Comment: : [id number] DAMIR CYNTHIAMeter ID: [id number]       POC Glucose Once [[id number]]  (Normal) Collected:  05/17/20 0152    Specimen:  Blood Updated:  05/17/20 0336     Glucose 122 mg/dL      Comment: : [id number] DAMIR CYNTHIAMeter ID: [id number]       POC Glucose Once [[id number]]  (Abnormal) Collected:  05/17/20 0043    Specimen:  Blood Updated:  05/17/20 0336     Glucose 213 mg/dL      Comment: : [id number] DAMIR CYNTHIAMeter ID: [id number]       Magnesium [[id number]]  (Abnormal) Collected:  05/16/20 2357    Specimen:  Blood Updated:  05/17/20 0105     Magnesium 1.4 mg/dL     Phosphorus [[id number]]  (Normal) Collected:  05/16/20 2357    Specimen:  Blood Updated:  05/17/20 0105     Phosphorus 2.8 mg/dL     Basic Metabolic Panel [[id number]]  (Abnormal) Collected:  05/16/20 2357    Specimen:  Blood Updated:  05/17/20 0105     Glucose 311 mg/dL      BUN 25 mg/dL      Creatinine 1.32 mg/dL      Sodium 138 mmol/L      Potassium 3.1 mmol/L      Chloride 102 mmol/L      CO2 24.0 mmol/L      Calcium 8.2 mg/dL      eGFR Non African Amer 43 mL/min/1.73      BUN/Creatinine Ratio 18.9     Anion Gap 12.0 mmol/L     Narrative:       GFR Normal >60  Chronic Kidney Disease <60  Kidney Failure <15      Basic Metabolic Panel [[id number]]  (Abnormal) Collected:  05/16/20 2215    Specimen:  Blood Updated:  05/16/20 2252     Glucose 504 mg/dL      BUN 27 mg/dL      Creatinine 1.47 mg/dL      Sodium 136 mmol/L      Potassium 3.4 mmol/L      Chloride 98 mmol/L      CO2 23.0 mmol/L      Calcium 8.4 mg/dL      eGFR Non African Amer 38 mL/min/1.73      BUN/Creatinine Ratio 18.4     Anion Gap 15.0 mmol/L     Narrative:       GFR Normal >60  Chronic Kidney Disease <60  Kidney Failure <15      Phosphorus [[id number]]  (Normal) Collected:   05/16/20 2028    Specimen:  Blood Updated:  05/16/20 2131     Phosphorus 3.3 mg/dL     Magnesium [[id number]]  (Normal) Collected:  05/16/20 2028    Specimen:  Blood Updated:  05/16/20 2131     Magnesium 1.6 mg/dL     Comprehensive Metabolic Panel [[id number]]  (Abnormal) Collected:  05/16/20 2028    Specimen:  Blood Updated:  05/16/20 2106     Glucose 723 mg/dL      BUN 27 mg/dL      Creatinine 1.49 mg/dL      Sodium 131 mmol/L      Potassium 3.7 mmol/L      Chloride 91 mmol/L      CO2 18.0 mmol/L      Calcium 8.4 mg/dL      Total Protein 5.8 g/dL      Albumin 2.20 g/dL      ALT (SGPT) 20 U/L      AST (SGOT) 12 U/L      Alkaline Phosphatase 203 U/L      Total Bilirubin 0.2 mg/dL      eGFR Non African Amer 37 mL/min/1.73      Globulin 3.6 gm/dL      A/G Ratio 0.6 g/dL      BUN/Creatinine Ratio 18.1     Anion Gap 22.0 mmol/L     Narrative:       GFR Normal >60  Chronic Kidney Disease <60  Kidney Failure <15      hCG, Serum, Qualitative [[id number]]  (Normal) Collected:  05/16/20 2029    Specimen:  Blood Updated:  05/16/20 2057     HCG Qualitative Negative    Hemoglobin A1c [[id number]]  (Abnormal) Collected:  05/16/20 2028    Specimen:  Blood Updated:  05/16/20 2057     Hemoglobin A1C 11.60 %     Narrative:       Hemoglobin A1C Ranges:    Increased Risk for Diabetes  5.7% to 6.4%  Diabetes                     >= 6.5%  Diabetic Goal                < 7.0%    Osmolality, Serum [[id number]]  (Abnormal) Collected:  05/16/20 2028    Specimen:  Blood Updated:  05/16/20 2055     Osmolality 320 mOsm/kg     CBC & Differential [[id number]] Collected:  05/16/20 2028    Specimen:  Blood Updated:  05/16/20 2044    Narrative:       The following orders were created for panel order CBC & Differential.  Procedure                               Abnormality         Status                     ---------                               -----------         ------                     CBC Auto Differential[[id number]]        Abnormal            Final  result                 Please view results for these tests on the individual orders.    CBC Auto Differential [[id number]]  (Abnormal) Collected:  05/16/20 2028    Specimen:  Blood Updated:  05/16/20 2044     WBC 11.77 10*3/mm3      RBC 3.12 10*6/mm3      Hemoglobin 9.0 g/dL      Hematocrit 28.5 %      MCV 91.3 fL      MCH 28.8 pg      MCHC 31.6 g/dL      RDW 15.5 %      RDW-SD 51.1 fl      MPV 10.9 fL      Platelets 199 10*3/mm3      Neutrophil % 82.7 %      Lymphocyte % 8.2 %      Monocyte % 6.8 %      Eosinophil % 0.1 %      Basophil % 0.3 %      Immature Grans % 1.9 %      Neutrophils, Absolute 9.74 10*3/mm3      Lymphocytes, Absolute 0.96 10*3/mm3      Monocytes, Absolute 0.80 10*3/mm3      Eosinophils, Absolute 0.01 10*3/mm3      Basophils, Absolute 0.04 10*3/mm3      Immature Grans, Absolute 0.22 10*3/mm3      nRBC 0.0 /100 WBC     Basic Metabolic Panel [[id number]]  (Abnormal) Collected:  05/16/20 1843    Specimen:  Blood Updated:  05/16/20 1959     Glucose 721 mg/dL      BUN 24 mg/dL      Creatinine 1.45 mg/dL      Sodium 132 mmol/L      Potassium 4.7 mmol/L      Chloride 90 mmol/L      CO2 20.0 mmol/L      Calcium 8.4 mg/dL      eGFR Non African Amer 38 mL/min/1.73      BUN/Creatinine Ratio 16.6     Anion Gap 22.0 mmol/L     Narrative:       GFR Normal >60  Chronic Kidney Disease <60  Kidney Failure <15      POC Glucose Once [[id number]]  (Abnormal) Collected:  05/14/20 1204    Specimen:  Blood Updated:  05/16/20 1103     Glucose 37 mg/dL      Comment: : [id number] [facility] REBECCAMeter ID: [id number]       POC Glucose Once [[id number]]  (Abnormal) Collected:  05/16/20 1045    Specimen:  Blood Updated:  05/16/20 1057     Glucose 306 mg/dL      Comment: RN NotifiedOperator: [id number] GAMBLE Victoriano ID: [id number]       POC Glucose Once [[id number]]  (Abnormal) Collected:  05/16/20 0500    Specimen:  Blood Updated:  05/16/20 1051     Glucose 46 mg/dL      Comment: : [id number] CORNELL  Omar ID: [id number]       Basic Metabolic Panel [[id number]]  (Abnormal) Collected:  05/16/20 0812    Specimen:  Blood Updated:  05/16/20 0927     Glucose 173 mg/dL      BUN 14 mg/dL      Creatinine 1.07 mg/dL      Sodium 138 mmol/L      Potassium 3.1 mmol/L      Chloride 100 mmol/L      CO2 27.0 mmol/L      Calcium 8.2 mg/dL      eGFR Non African Amer 54 mL/min/1.73      BUN/Creatinine Ratio 13.1     Anion Gap 11.0 mmol/L     Narrative:       GFR Normal >60  Chronic Kidney Disease <60  Kidney Failure <15      CBC & Differential [[id number]] Collected:  05/16/20 0812    Specimen:  Blood Updated:  05/16/20 0907    Narrative:       The following orders were created for panel order CBC & Differential.  Procedure                               Abnormality         Status                     ---------                               -----------         ------                     CBC Auto Differential[[id number]]        Abnormal            Final result                 Please view results for these tests on the individual orders.    CBC Auto Differential [[id number]]  (Abnormal) Collected:  05/16/20 0812    Specimen:  Blood Updated:  05/16/20 0907     WBC 11.04 10*3/mm3      RBC 2.79 10*6/mm3      Hemoglobin 8.1 g/dL      Hematocrit 24.0 %      MCV 86.0 fL      MCH 29.0 pg      MCHC 33.8 g/dL      RDW 15.1 %      RDW-SD 47.4 fl      MPV 10.9 fL      Platelets 183 10*3/mm3      Neutrophil % 74.2 %      Lymphocyte % 14.4 %      Monocyte % 8.3 %      Eosinophil % 1.2 %      Basophil % 0.4 %      Immature Grans % 1.5 %      Neutrophils, Absolute 8.19 10*3/mm3      Lymphocytes, Absolute 1.59 10*3/mm3      Monocytes, Absolute 0.92 10*3/mm3      Eosinophils, Absolute 0.13 10*3/mm3      Basophils, Absolute 0.04 10*3/mm3      Immature Grans, Absolute 0.17 10*3/mm3      nRBC 0.0 /100 WBC     POC Glucose Once [[id number]]  (Normal) Collected:  05/16/20 0635    Specimen:  Blood Updated:  05/16/20 0647     Glucose 114 mg/dL       Comment: RN NotifiedOperator: [id number] TRAMAINE MCGUIREMeter ID: [id number]       POC Glucose Once [[id number]]  (Normal) Collected:  05/16/20 0533    Specimen:  Blood Updated:  05/16/20 0547     Glucose 107 mg/dL      Comment: : [id number] CORNELL ACOSTAMeter ID: [id number]       POC Glucose Once [[id number]]  (Abnormal) Collected:  05/15/20 2036    Specimen:  Blood Updated:  05/16/20 0239     Glucose 266 mg/dL      Comment: RN NotifiedOperator: [id number] MICHELLE MIKAYLAMeter ID: [id number]       POC Glucose Once [[id number]]  (Abnormal) Collected:  05/15/20 2021    Specimen:  Blood Updated:  05/15/20 2321     Glucose 278 mg/dL      Comment: RN NotifiedOperator: [id number] EDUARD PACEHANYMeter ID: [id number]       POC Glucose Once [[id number]]  (Abnormal) Collected:  05/15/20 1621    Specimen:  Blood Updated:  05/15/20 1648     Glucose 224 mg/dL      Comment: RN NotifiedOperator: [id number] MIGUEL DUGAN BMeter ID: [id number]       Phosphorus [[id number]]  (Normal) Collected:  05/15/20 1210    Specimen:  Blood Updated:  05/15/20 1233     Phosphorus 2.9 mg/dL     POC Glucose Once [[id number]]  (Abnormal) Collected:  05/15/20 1136    Specimen:  Blood Updated:  05/15/20 1151     Glucose 156 mg/dL      Comment: : [id number] ODONNELL AZULMeter ID: [id number]       Basic Metabolic Panel [[id number]]  (Abnormal) Collected:  05/15/20 0520    Specimen:  Blood Updated:  05/15/20 0622     Glucose 295 mg/dL      BUN 20 mg/dL      Creatinine 1.28 mg/dL      Sodium 150 mmol/L      Potassium 3.6 mmol/L      Chloride 114 mmol/L      CO2 24.0 mmol/L      Calcium 8.6 mg/dL      eGFR Non African Amer 44 mL/min/1.73      BUN/Creatinine Ratio 15.6     Anion Gap 12.0 mmol/L     Narrative:       GFR Normal >60  Chronic Kidney Disease <60  Kidney Failure <15      Phosphorus [[id number]]  (Normal) Collected:  05/15/20 0520    Specimen:  Blood Updated:  05/15/20 0622     Phosphorus 3.5 mg/dL     POC Glucose Once  [[id number]]  (Abnormal) Collected:  05/15/20 0537    Specimen:  Blood Updated:  05/15/20 0606     Glucose 281 mg/dL      Comment: : [id number] Arc[employer] KENDRAMeter ID: [id number]       POC Glucose Once [[id number]]  (Abnormal) Collected:  05/15/20 0305    Specimen:  Blood Updated:  05/15/20 0606     Glucose 336 mg/dL      Comment: : [id number] Arc[employer]ER KENDRAMeter ID: [id number]       POC Glucose Once [[id number]]  (Abnormal) Collected:  05/14/20 1220    Specimen:  Blood Updated:  05/15/20 0533     Glucose 151 mg/dL      Comment: : [id number] [facility] REBECCAMeter ID: [id number]       Phosphorus [[id number]]  (Normal) Collected:  05/15/20 0115    Specimen:  Blood Updated:  05/15/20 0137     Phosphorus 3.3 mg/dL     POC Glucose Once [[id number]]  (Abnormal) Collected:  05/14/20 2350    Specimen:  Blood Updated:  05/15/20 0021     Glucose 324 mg/dL      Comment: : [id number] [facility] KENDRAMeter ID: [id number]       POC Glucose Once [[id number]]  (Abnormal) Collected:  05/14/20 2111    Specimen:  Blood Updated:  05/14/20 2203     Glucose 319 mg/dL      Comment: : [id number] [facility] KENDRAMeter ID: [id number]       POC Glucose Once [[id number]]  (Abnormal) Collected:  05/14/20 1752    Specimen:  Blood Updated:  05/14/20 1933     Glucose 184 mg/dL      Comment: Result Not ConfirmedOperator: [id number] [facility] REBECCAMeter ID: [id number]       Phosphorus [[id number]]  (Normal) Collected:  05/14/20 1807    Specimen:  Blood Updated:  05/14/20 1824     Phosphorus 2.8 mg/dL     Phosphorus [[id number]]  (Normal) Collected:  05/14/20 1305    Specimen:  Blood Updated:  05/14/20 1404     Phosphorus 3.1 mg/dL     POC Glucose Once [[id number]]  (Abnormal) Collected:  05/14/20 1304    Specimen:  Blood Updated:  05/14/20 1329     Glucose 140 mg/dL      Comment: Result Not ConfirmedOperator: [id number] [facility] REBECCAMeter ID: [id number]       Magnesium [[id number]]  (Normal) Collected:   05/14/20 0435    Specimen:  Blood Updated:  05/14/20 1055     Magnesium 1.6 mg/dL     POC Glucose Once [[id number]]  (Abnormal) Collected:  05/14/20 0257    Specimen:  Blood Updated:  05/14/20 0900     Glucose 43 mg/dL      Comment: Result Not ConfirmedOperator: [id number] SUSAN Parker ID: [id number]       POC Glucose Once [[id number]]  (Abnormal) Collected:  05/14/20 0253    Specimen:  Blood Updated:  05/14/20 0900     Glucose 42 mg/dL      Comment: : [id number] SUSAN Parker ID: [id number]       POC Glucose Once [[id number]]  (Abnormal) Collected:  05/14/20 0210    Specimen:  Blood Updated:  05/14/20 0859     Glucose 36 mg/dL      Comment: : [id number] SUSAN Parker ID: [id number]       POC Glucose Once [[id number]]  (Abnormal) Collected:  05/14/20 0208    Specimen:  Blood Updated:  05/14/20 0859     Glucose 37 mg/dL      Comment: : [id number] SUSAN Parker ID: [id number]       Blood Culture - Blood, Arm, Left [[id number]]  (Abnormal) Collected:  05/11/20 2238    Specimen:  Blood from Arm, Left Updated:  05/14/20 0551     Blood Culture Escherichia coli     Isolated from Aerobic and Anaerobic Bottles     Gram Stain Aerobic Bottle Gram negative bacilli     Comment: 2 bottles of 4 drawn positive for gram negative bacilli         Anaerobic Bottle Gram negative bacilli     Comment: 3 bottles of 4 drawn positive for gram negative bacilli       Narrative:       Refer to previous blood culture collected on 5/11/20 for MICs    Blood Culture - Blood, Wrist, Right [[id number]]  (Abnormal)  (Susceptibility) Collected:  05/11/20 1918    Specimen:  Blood from Wrist, Right Updated:  05/14/20 0551     Blood Culture Escherichia coli     Isolated from Aerobic and Anaerobic Bottles     Gram Stain Aerobic Bottle Gram negative bacilli     Comment: 1  Bottle of 4 drawn positive for gram negative bacilli         Anaerobic Bottle Gram negative bacilli     Comment: 4  bottles of 4 drawn positive for gram negative bacilli       Susceptibility      Escherichia coli     MAHENDRA     Ampicillin Susceptible     Ampicillin + Sulbactam Susceptible     Cefepime Susceptible     Ceftazidime Susceptible     Ceftriaxone Susceptible     Gentamicin Susceptible     Levofloxacin Susceptible     Piperacillin + Tazobactam Susceptible     Trimethoprim + Sulfamethoxazole Susceptible                Susceptibility Comments     Escherichia coli    Cefazolin sensitivity will not be reported for Enterobacteriaceae in non-urine isolates. If cefazolin is preferred, please call the microbiology lab to request an E-test.  With the exception of urinary-sourced infections, aminoglycosides should not be used as monotherapy.             Basic Metabolic Panel [[id number]]  (Abnormal) Collected:  05/14/20 0435    Specimen:  Blood Updated:  05/14/20 0527     Glucose 116 mg/dL      BUN 20 mg/dL      Creatinine 1.57 mg/dL      Sodium 145 mmol/L      Potassium 3.8 mmol/L      Chloride 116 mmol/L      CO2 16.0 mmol/L      Calcium 8.3 mg/dL      eGFR Non African Amer 35 mL/min/1.73      BUN/Creatinine Ratio 12.7     Anion Gap 13.0 mmol/L     Narrative:       GFR Normal >60  Chronic Kidney Disease <60  Kidney Failure <15      Phosphorus [[id number]]  (Abnormal) Collected:  05/14/20 0435    Specimen:  Blood Updated:  05/14/20 0527     Phosphorus 1.7 mg/dL     CBC & Differential [[id number]] Collected:  05/14/20 0435    Specimen:  Blood Updated:  05/14/20 0507    Narrative:       The following orders were created for panel order CBC & Differential.  Procedure                               Abnormality         Status                     ---------                               -----------         ------                     CBC Auto Differential[[id number]]        Abnormal            Final result                 Please view results for these tests on the individual orders.    CBC Auto Differential [[id number]]  (Abnormal) Collected:   05/14/20 0435    Specimen:  Blood Updated:  05/14/20 0507     WBC 14.30 10*3/mm3      RBC 4.38 10*6/mm3      Hemoglobin 12.5 g/dL      Hematocrit 37.7 %      MCV 86.1 fL      MCH 28.5 pg      MCHC 33.2 g/dL      RDW 14.8 %      RDW-SD 46.4 fl      MPV 12.0 fL      Platelets 129 10*3/mm3      Neutrophil % 85.5 %      Lymphocyte % 7.6 %      Monocyte % 4.3 %      Eosinophil % 0.3 %      Basophil % 0.0 %      Immature Grans % 2.3 %      Neutrophils, Absolute 12.23 10*3/mm3      Lymphocytes, Absolute 1.08 10*3/mm3      Monocytes, Absolute 0.62 10*3/mm3      Eosinophils, Absolute 0.04 10*3/mm3      Basophils, Absolute 0.00 10*3/mm3      Immature Grans, Absolute 0.33 10*3/mm3      nRBC 0.0 /100 WBC     Urine Culture - Urine, Urine, Clean Catch [[id number]]  (Abnormal)  (Susceptibility) Collected:  05/11/20 2003    Specimen:  Urine, Clean Catch Updated:  05/14/20 0350     Urine Culture >100,000 CFU/mL Escherichia coli    Susceptibility      Escherichia coli     MAHENDRA     Ampicillin Susceptible     Ampicillin + Sulbactam Susceptible     Cefazolin Susceptible     Cefepime Susceptible     Ceftazidime Susceptible     Ceftriaxone Susceptible     Gentamicin Susceptible     Levofloxacin Susceptible     Nitrofurantoin Susceptible     Piperacillin + Tazobactam Susceptible     Tetracycline Susceptible     Trimethoprim + Sulfamethoxazole Susceptible                    POC Glucose Once [[id number]]  (Normal) Collected:  05/14/20 0334    Specimen:  Blood Updated:  05/14/20 0346     Glucose 125 mg/dL      Comment: : [id number] [employer] ID: [id number]       POC Glucose Once [[id number]]  (Normal) Collected:  05/14/20 0004    Specimen:  Blood Updated:  05/14/20 0015     Glucose 72 mg/dL      Comment: : [id number] [employer] ID: [id number]       POC Glucose Once [[id number]]  (Abnormal) Collected:  05/13/20 2006    Specimen:  Blood Updated:  05/13/20 2019     Glucose 180 mg/dL      Comment:  : [id number] SUSANGERARD Sanchezter ID: [id number]       POC Glucose Once [[id number]]  (Abnormal) Collected:  05/13/20 1656    Specimen:  Blood Updated:  05/13/20 1726     Glucose 255 mg/dL      Comment: : [id number] MAYEN MARIAHMeter ID: [id number]       Potassium [[id number]]  (Normal) Collected:  05/13/20 1611    Specimen:  Blood Updated:  05/13/20 1647     Potassium 4.4 mmol/L     Phosphorus [[id number]]  (Normal) Collected:  05/13/20 1611    Specimen:  Blood Updated:  05/13/20 1647     Phosphorus 2.6 mg/dL     POC Glucose Once [[id number]]  (Abnormal) Collected:  05/13/20 1056    Specimen:  Blood Updated:  05/13/20 1134     Glucose 222 mg/dL      Comment: : [id number] MAYEN MARIAHMeter ID: [id number]       POC Glucose Once [[id number]]  (Abnormal) Collected:  05/13/20 0951    Specimen:  Blood Updated:  05/13/20 1133     Glucose 183 mg/dL      Comment: : [id number] MAYEN MARIAHMeter ID: [id number]       POC Glucose Once [[id number]]  (Normal) Collected:  05/13/20 0851    Specimen:  Blood Updated:  05/13/20 1133     Glucose 97 mg/dL      Comment: : [id number] MAYEN MARIAHMeter ID: [id number]       POC Glucose Once [[id number]]  (Normal) Collected:  05/13/20 0803    Specimen:  Blood Updated:  05/13/20 1133     Glucose 123 mg/dL      Comment: : [id number] MAYEN MARIAHMeter ID: [id number]       POC Glucose Once [[id number]]  (Normal) Collected:  05/13/20 0654    Specimen:  Blood Updated:  05/13/20 1133     Glucose 103 mg/dL      Comment: : [id number] [facility]olandaM[facility] ID: [id number]       Phosphorus [[id number]]  (Abnormal) Collected:  05/13/20 1014    Specimen:  Blood Updated:  05/13/20 1103     Phosphorus 1.9 mg/dL     Magnesium [[id number]]  (Normal) Collected:  05/13/20 1014    Specimen:  Blood Updated:  05/13/20 1051     Magnesium 1.6 mg/dL     Phosphorus [[id number]]  (Abnormal) Collected:  05/13/20 0558    Specimen:  Blood Updated:  05/13/20 0797      Phosphorus 1.7 mg/dL     Basic Metabolic Panel [[id number]]  (Abnormal) Collected:  05/13/20 0558    Specimen:  Blood Updated:  05/13/20 0720     Glucose 134 mg/dL      BUN 31 mg/dL      Creatinine 1.71 mg/dL      Sodium 140 mmol/L      Potassium 4.3 mmol/L      Chloride 115 mmol/L      CO2 12.0 mmol/L      Calcium 7.9 mg/dL      eGFR Non African Amer 32 mL/min/1.73      BUN/Creatinine Ratio 18.1     Anion Gap 13.0 mmol/L     Narrative:       GFR Normal >60  Chronic Kidney Disease <60  Kidney Failure <15      Magnesium [[id number]]  (Abnormal) Collected:  05/13/20 0558    Specimen:  Blood Updated:  05/13/20 0720     Magnesium 1.5 mg/dL     POC Glucose Once [[id number]]  (Normal) Collected:  05/13/20 0556    Specimen:  Blood Updated:  05/13/20 0615     Glucose 112 mg/dL      Comment: : [id number] [employer] ID: [id number]       POC Glucose Once [[id number]]  (Normal) Collected:  05/13/20 0455    Specimen:  Blood Updated:  05/13/20 0510     Glucose 117 mg/dL      Comment: : [id number] [employer] ID: [id number]       POC Glucose Once [[id number]]  (Normal) Collected:  05/13/20 0354    Specimen:  Blood Updated:  05/13/20 0429     Glucose 112 mg/dL      Comment: : [id number] [employer] ID: [id number]       Basic Metabolic Panel [[id number]]  (Abnormal) Collected:  05/13/20 0310    Specimen:  Blood Updated:  05/13/20 0416     Glucose 207 mg/dL      BUN 36 mg/dL      Creatinine 1.71 mg/dL      Sodium 138 mmol/L      Potassium 5.3 mmol/L      Chloride 113 mmol/L      CO2 9.0 mmol/L      Calcium 7.6 mg/dL      eGFR Non African Amer 32 mL/min/1.73      BUN/Creatinine Ratio 21.1     Anion Gap 16.0 mmol/L     Narrative:       GFR Normal >60  Chronic Kidney Disease <60  Kidney Failure <15      Magnesium [[id number]]  (Normal) Collected:  05/13/20 0310    Specimen:  Blood Updated:  05/13/20 0337     Magnesium 1.7 mg/dL     Phosphorus [[id number]]  (Abnormal) Collected:  05/13/20 0314      Specimen:  Blood Updated:  05/13/20 0337     Phosphorus 2.3 mg/dL     POC Glucose Once [[id number]]  (Abnormal) Collected:  05/13/20 0252    Specimen:  Blood Updated:  05/13/20 0305     Glucose 172 mg/dL      Comment: : [id number] CHARLEENSTEFANI Wilhelmgrey ID: [id number]       POC Glucose Once [[id number]]  (Abnormal) Collected:  05/13/20 0144    Specimen:  Blood Updated:  05/13/20 0156     Glucose 161 mg/dL      Comment: : [id number] ALEJO EDUARDARolandater ID: [id number]       POC Glucose Once [[id number]]  (Abnormal) Collected:  05/13/20 0045    Specimen:  Blood Updated:  05/13/20 0058     Glucose 225 mg/dL      Comment: : [id number] ALEJO EDUARDAMattie ID: [id number]       POC Glucose Once [[id number]]  (Abnormal) Collected:  05/12/20 2346    Specimen:  Blood Updated:  05/12/20 2357     Glucose 272 mg/dL      Comment: : [id number] ALEJO Cooper ID: [id number]       POC Glucose Once [[id number]]  (Abnormal) Collected:  05/12/20 2239    Specimen:  Blood Updated:  05/12/20 2357     Glucose 242 mg/dL      Comment: Result Not ConfirmedOperator: [id number] ALEJO EDUARDAMattie ID: [id number]       POC Glucose Once [[id number]]  (Abnormal) Collected:  05/12/20 2150    Specimen:  Blood Updated:  05/12/20 2357     Glucose 268 mg/dL      Comment: : [id number] ALEJO Cooper ID: [id number]       Basic Metabolic Panel [[id number]]  (Abnormal) Collected:  05/12/20 2239    Specimen:  Blood Updated:  05/12/20 2307     Glucose 252 mg/dL      BUN 38 mg/dL      Creatinine 1.63 mg/dL      Sodium 142 mmol/L      Potassium 5.3 mmol/L      Chloride 115 mmol/L      CO2 12.0 mmol/L      Calcium 7.5 mg/dL      eGFR Non African Amer 33 mL/min/1.73      BUN/Creatinine Ratio 23.3     Anion Gap 15.0 mmol/L     Narrative:       GFR Normal >60  Chronic Kidney Disease <60  Kidney Failure <15      Magnesium [[id number]]  (Normal) Collected:  05/12/20 2239    Specimen:  Blood Updated:  05/12/20 2099      Magnesium 1.6 mg/dL     Phosphorus [[id number]]  (Normal) Collected:  05/12/20 2239    Specimen:  Blood Updated:  05/12/20 2307     Phosphorus 2.5 mg/dL     POC Glucose Once [[id number]]  (Abnormal) Collected:  05/12/20 2035    Specimen:  Blood Updated:  05/12/20 2048     Glucose 207 mg/dL      Comment: : [id number] ALEJO Cooper ID: [id number]       Phosphorus [[id number]]  (Normal) Collected:  05/12/20 1758    Specimen:  Blood Updated:  05/12/20 1827     Phosphorus 2.8 mg/dL     Basic Metabolic Panel [[id number]]  (Abnormal) Collected:  05/12/20 1758    Specimen:  Blood Updated:  05/12/20 1825     Glucose 216 mg/dL      BUN 41 mg/dL      Creatinine 1.69 mg/dL      Sodium 143 mmol/L      Potassium 4.8 mmol/L      Chloride 116 mmol/L      CO2 13.0 mmol/L      Calcium 7.3 mg/dL      eGFR Non African Amer 32 mL/min/1.73      BUN/Creatinine Ratio 24.3     Anion Gap 14.0 mmol/L     Narrative:       GFR Normal >60  Chronic Kidney Disease <60  Kidney Failure <15      Magnesium [[id number]]  (Normal) Collected:  05/12/20 1758    Specimen:  Blood Updated:  05/12/20 1825     Magnesium 1.6 mg/dL     POC Glucose Once [[id number]]  (Abnormal) Collected:  05/12/20 1757    Specimen:  Blood Updated:  05/12/20 1821     Glucose 199 mg/dL      Comment: : [id number] SAMY DENISEMeter ID: [id number]       POC Glucose Once [[id number]]  (Abnormal) Collected:  05/12/20 1700    Specimen:  Blood Updated:  05/12/20 1821     Glucose 203 mg/dL      Comment: : [id number] SAMY DENISEMeter ID: [id number]       POC Glucose Once [[id number]]  (Abnormal) Collected:  05/12/20 1606    Specimen:  Blood Updated:  05/12/20 1618     Glucose 164 mg/dL      Comment: : [id number] SAMY DENISEMeter ID: [id number]       POC Glucose Once [[id number]]  (Abnormal) Collected:  05/12/20 1500    Specimen:  Blood Updated:  05/12/20 1513     Glucose 150 mg/dL      Comment: : [id number] SAMY BAKERISEMeter ID:  [id number]       POC Glucose Once [[id number]]  (Abnormal) Collected:  05/12/20 1332    Specimen:  Blood Updated:  05/12/20 1513     Glucose 138 mg/dL      Comment: : [id number] [employer] ID: [id number]       Phosphorus [[id number]]  (Abnormal) Collected:  05/12/20 1408    Specimen:  Blood Updated:  05/12/20 1433     Phosphorus 2.2 mg/dL     Basic Metabolic Panel [[id number]]  (Abnormal) Collected:  05/12/20 1408    Specimen:  Blood Updated:  05/12/20 1433     Glucose 169 mg/dL      BUN 44 mg/dL      Creatinine 1.68 mg/dL      Sodium 142 mmol/L      Potassium 5.2 mmol/L      Chloride 114 mmol/L      CO2 15.0 mmol/L      Calcium 7.5 mg/dL      eGFR Non African Amer 32 mL/min/1.73      BUN/Creatinine Ratio 26.2     Anion Gap 13.0 mmol/L     Narrative:       GFR Normal >60  Chronic Kidney Disease <60  Kidney Failure <15      Magnesium [[id number]]  (Normal) Collected:  05/12/20 1408    Specimen:  Blood Updated:  05/12/20 1432     Magnesium 1.7 mg/dL     POC Glucose Once [[id number]]  (Abnormal) Collected:  05/12/20 1210    Specimen:  Blood Updated:  05/12/20 1223     Glucose 136 mg/dL      Comment: : [id number] PABLO DENISEMeter ID: [id number]       POC Glucose Once [[id number]]  (Abnormal) Collected:  05/12/20 1052    Specimen:  Blood Updated:  05/12/20 1222     Glucose 185 mg/dL      Comment: : [id number] [employer] ID: [id number]       POC Glucose Once [[id number]]  (Abnormal) Collected:  05/12/20 1002    Specimen:  Blood Updated:  05/12/20 1222     Glucose 217 mg/dL      Comment: : [id number] [employer] ID: [id number]       Blood Culture ID, PCR - Blood, Wrist, Right [[id number]]  (Abnormal) Collected:  05/11/20 1918    Specimen:  Blood from Wrist, Right Updated:  05/12/20 1216     BCID, PCR Escherichia coli. Identification by BCID PCR.    Narrative:       Sensitivity to Follow    Phosphorus [[id number]]  (Abnormal) Collected:  05/12/20 1015    Specimen:  Blood  Updated:  05/12/20 1113     Phosphorus 1.2 mg/dL     Basic Metabolic Panel [[id number]]  (Abnormal) Collected:  05/12/20 1015    Specimen:  Blood Updated:  05/12/20 1112     Glucose 230 mg/dL      BUN 45 mg/dL      Creatinine 1.71 mg/dL      Sodium 140 mmol/L      Potassium 3.9 mmol/L      Chloride 112 mmol/L      CO2 13.0 mmol/L      Calcium 7.4 mg/dL      eGFR Non African Amer 32 mL/min/1.73      BUN/Creatinine Ratio 26.3     Anion Gap 15.0 mmol/L     Narrative:       GFR Normal >60  Chronic Kidney Disease <60  Kidney Failure <15      Magnesium [[id number]]  (Normal) Collected:  05/12/20 1015    Specimen:  Blood Updated:  05/12/20 1112     Magnesium 1.6 mg/dL     POC Glucose Once [[id number]]  (Abnormal) Collected:  05/12/20 0915    Specimen:  Blood Updated:  05/12/20 1011     Glucose 258 mg/dL      Comment: : [id number] MAYENNet Transmit & Receiveeter ID: [id number]       POC Glucose Once [[id number]]  (Abnormal) Collected:  05/12/20 0752    Specimen:  Blood Updated:  05/12/20 1011     Glucose 305 mg/dL      Comment: : [id number] [employer] ID: [id number]       POC Glucose Once [[id number]]  (Abnormal) Collected:  05/12/20 0555    Specimen:  Blood Updated:  05/12/20 0747     Glucose 411 mg/dL      Comment: RN NotifiedOperator: [id number] ROSALINDA RUIZMeter ID: [id number]       POC Glucose Once [[id number]]  (Abnormal) Collected:  05/12/20 0436    Specimen:  Blood Updated:  05/12/20 0747     Glucose 430 mg/dL      Comment: RN NotifiedOperator: [id number] ROSALINDA SALCEDOSONMeter ID: [id number]       Comprehensive Metabolic Panel [[id number]]  (Abnormal) Collected:  05/12/20 0610    Specimen:  Blood Updated:  05/12/20 0656     Glucose 426 mg/dL      BUN 49 mg/dL      Creatinine 1.86 mg/dL      Sodium 134 mmol/L      Potassium 3.4 mmol/L      Chloride 104 mmol/L      CO2 13.0 mmol/L      Calcium 7.4 mg/dL      Total Protein 5.1 g/dL      Albumin 2.20 g/dL      ALT (SGPT) 41 U/L      AST (SGOT) 97 U/L      Alkaline  Phosphatase 227 U/L      Total Bilirubin 0.2 mg/dL      eGFR Non African Amer 29 mL/min/1.73      Globulin 2.9 gm/dL      A/G Ratio 0.8 g/dL      BUN/Creatinine Ratio 26.3     Anion Gap 17.0 mmol/L     Narrative:       GFR Normal >60  Chronic Kidney Disease <60  Kidney Failure <15      Phosphorus [[id number]]  (Abnormal) Collected:  05/12/20 0610    Specimen:  Blood Updated:  05/12/20 0656     Phosphorus 1.6 mg/dL     TSH [[id number]]  (Normal) Collected:  05/12/20 0610    Specimen:  Blood Updated:  05/12/20 0649     TSH 1.220 uIU/mL      Comment: TSH results may be falsely decreased if patient taking Biotin.       Magnesium [[id number]]  (Normal) Collected:  05/12/20 0610    Specimen:  Blood Updated:  05/12/20 0644     Magnesium 1.6 mg/dL     CBC Auto Differential [[id number]]  (Abnormal) Collected:  05/12/20 0610    Specimen:  Blood Updated:  05/12/20 0620     WBC 5.05 10*3/mm3      RBC 3.26 10*6/mm3      Hemoglobin 9.7 g/dL      Hematocrit 28.3 %      MCV 86.8 fL      MCH 29.8 pg      MCHC 34.3 g/dL      RDW 13.4 %      RDW-SD 42.6 fl      MPV 9.7 fL      Platelets 194 10*3/mm3      Neutrophil % 78.2 %      Lymphocyte % 11.9 %      Monocyte % 8.3 %      Eosinophil % 0.0 %      Basophil % 0.4 %      Immature Grans % 1.2 %      Neutrophils, Absolute 3.95 10*3/mm3      Lymphocytes, Absolute 0.60 10*3/mm3      Monocytes, Absolute 0.42 10*3/mm3      Eosinophils, Absolute 0.00 10*3/mm3      Basophils, Absolute 0.02 10*3/mm3      Immature Grans, Absolute 0.06 10*3/mm3      nRBC 0.4 /100 WBC     Phosphorus [[id number]]  (Normal) Collected:  05/12/20 0241    Specimen:  Blood Updated:  05/12/20 0334     Phosphorus 2.6 mg/dL     Magnesium [[id number]]  (Normal) Collected:  05/12/20 0241    Specimen:  Blood Updated:  05/12/20 0334     Magnesium 1.7 mg/dL     Basic Metabolic Panel [[id number]]  (Abnormal) Collected:  05/12/20 0241    Specimen:  Blood Updated:  05/12/20 0333     Glucose 624 mg/dL      BUN 51 mg/dL      Creatinine  1.88 mg/dL      Sodium 130 mmol/L      Potassium 3.1 mmol/L      Chloride 99 mmol/L      CO2 10.0 mmol/L      Calcium 7.2 mg/dL      eGFR Non African Amer 28 mL/min/1.73      BUN/Creatinine Ratio 27.1     Anion Gap 21.0 mmol/L     Narrative:       GFR Normal >60  Chronic Kidney Disease <60  Kidney Failure <15      Extra Tubes [[id number]] Collected:  05/11/20 2248    Specimen:  Blood, Venous Line Updated:  05/12/20 0300    Narrative:       The following orders were created for panel order Extra Tubes.  Procedure                               Abnormality         Status                     ---------                               -----------         ------                     Mendoza Top[[id number]]                                         Final result                 Please view results for these tests on the individual orders.    Gray Top [[id number]] Collected:  05/11/20 2248    Specimen:  Blood Updated:  05/12/20 0300     Extra Tube Hold for add-ons.     Comment: Auto resulted.       Osmolality, Serum [[id number]]  (Abnormal) Collected:  05/11/20 2238    Specimen:  Blood from Arm, Left Updated:  05/11/20 2328     Osmolality 347 mOsm/kg     Blood Gas, Arterial [[id number]]  (Abnormal) Collected:  05/11/20 2315    Specimen:  Arterial Blood Updated:  05/11/20 2324     Site Right Radial     Daniel's Test N/A     pH, Arterial 7.052 pH units      Comment: 85 Value below critical limit        pCO2, Arterial 13.6 mm Hg      Comment: 85 Value below critical limit        pO2, Arterial 154.0 mm Hg      Comment: 83 Value above reference range        HCO3, Arterial 3.8 mmol/L      Comment: 84 Value below reference range        Base Excess, Arterial -24.7 mmol/L      Comment: 84 Value below reference range        O2 Saturation, Arterial 99.4 %      Comment: 83 Value above reference range        Barometric Pressure for Blood Gas 753 mmHg      Modality Room Air     Ventilator Mode NA     Collected by CD     Comment: Meter:  [id number]     :  [id number]       Comprehensive Metabolic Panel [[id number]]  (Abnormal) Collected:  05/11/20 2238    Specimen:  Blood from Arm, Left Updated:  05/11/20 2320     Glucose 1,192 mg/dL      BUN 57 mg/dL      Creatinine 2.35 mg/dL      Sodium 112 mmol/L      Potassium 5.2 mmol/L      Chloride 77 mmol/L      CO2 3.0 mmol/L      Calcium 8.3 mg/dL      Total Protein 6.1 g/dL      Albumin 2.50 g/dL      ALT (SGPT) 20 U/L      AST (SGOT) 43 U/L      Alkaline Phosphatase 280 U/L      Total Bilirubin 0.3 mg/dL      eGFR Non African Amer 22 mL/min/1.73      Globulin 3.6 gm/dL      A/G Ratio 0.7 g/dL      BUN/Creatinine Ratio 24.3     Anion Gap 32.0 mmol/L     Narrative:       GFR Normal >60  Chronic Kidney Disease <60  Kidney Failure <15      Lactic Acid, Reflex [[id number]]  (Normal) Collected:  05/11/20 2245    Specimen:  Blood Updated:  05/11/20 2313     Lactate 2.0 mmol/L     Phosphorus [[id number]]  (Abnormal) Collected:  05/11/20 2238    Specimen:  Blood from Arm, Left Updated:  05/11/20 2310     Phosphorus 6.9 mg/dL     Magnesium [[id number]]  (Normal) Collected:  05/11/20 2238    Specimen:  Blood from Arm, Left Updated:  05/11/20 2310     Magnesium 2.4 mg/dL     Troponin [[id number]]  (Normal) Collected:  05/11/20 2238    Specimen:  Blood from Arm, Left Updated:  05/11/20 2307     Troponin T <0.010 ng/mL     Narrative:       Troponin T Reference Range:  <= 0.03 ng/mL-   Negative for AMI  >0.03 ng/mL-     Abnormal for myocardial necrosis.  Clinicians would have to utilize clinical acumen, EKG, Troponin and serial changes to determine if it is an Acute Myocardial Infarction or myocardial injury due to an underlying chronic condition.       Results may be falsely decreased if patient taking Biotin.      Hemoglobin A1c [[id number]]  (Abnormal) Collected:  05/11/20 2238    Specimen:  Blood from Arm, Left Updated:  05/11/20 2307     Hemoglobin A1C 11.50 %     Narrative:       Hemoglobin A1C  Ranges:    Increased Risk for Diabetes  5.7% to 6.4%  Diabetes                     >= 6.5%  Diabetic Goal                < 7.0%    Lactic Acid, Reflex Timer (This will reflex a repeat order 3-3:15 hours after ordered.) [[id number]] Collected:  05/11/20 1918    Specimen:  Blood Updated:  05/11/20 2300     Hold Tube Hold for add-ons.     Comment: Auto resulted.       CBC & Differential [[id number]] Collected:  05/11/20 2238    Specimen:  Blood from Arm, Left Updated:  05/11/20 2256    Narrative:       The following orders were created for panel order CBC & Differential.  Procedure                               Abnormality         Status                     ---------                               -----------         ------                     CBC Auto Differential[[id number]]        Abnormal            Final result                 Please view results for these tests on the individual orders.    CBC Auto Differential [[id number]]  (Abnormal) Collected:  05/11/20 2238    Specimen:  Blood from Arm, Left Updated:  05/11/20 2256     WBC 19.62 10*3/mm3      RBC 3.60 10*6/mm3      Hemoglobin 10.8 g/dL      Hematocrit 37.4 %      .9 fL      MCH 30.0 pg      MCHC 28.9 g/dL      RDW 15.3 %      RDW-SD 59.0 fl      MPV 10.3 fL      Platelets 288 10*3/mm3      Neutrophil % 90.1 %      Lymphocyte % 7.5 %      Monocyte % 1.2 %      Eosinophil % 0.0 %      Basophil % 0.4 %      Immature Grans % 0.8 %      Neutrophils, Absolute 17.68 10*3/mm3      Lymphocytes, Absolute 1.48 10*3/mm3      Monocytes, Absolute 0.23 10*3/mm3      Eosinophils, Absolute 0.00 10*3/mm3      Basophils, Absolute 0.08 10*3/mm3      Immature Grans, Absolute 0.15 10*3/mm3      nRBC 0.7 /100 WBC     Ethanol [[id number]] Collected:  05/11/20 1803    Specimen:  Blood Updated:  05/11/20 2119     Ethanol <10 mg/dL      Ethanol % <0.010 %     Blood Gas, Arterial [[id number]]  (Abnormal) Collected:  05/11/20 2042    Specimen:  Arterial Blood Updated:  05/11/20 2052      Site Left Brachial     Daniel's Test N/A     pH, Arterial 6.828 pH units      Comment: 85 Value below critical limit        pCO2, Arterial 11.7 mm Hg      Comment: 85 Value below critical limit        pO2, Arterial 145.0 mm Hg      Comment: 83 Value above reference range        HCO3, Arterial 1.9 mmol/L      Comment: 84 Value below reference range        Base Excess, Arterial -30.6 mmol/L      Comment:  The parameter value has a question [id number] Base Excess out of range84 Value below reference range        O2 Saturation, Arterial 98.4 %      Barometric Pressure for Blood Gas 753 mmHg      Modality Room Air     FIO2 21 %      Ventilator Mode NA     Collected by PW     Comment: Meter: [id number]     :  [id number]       Urinalysis, Microscopic Only - Urine, Clean Catch [[id number]]  (Abnormal) Collected:  05/11/20 2003    Specimen:  Urine, Clean Catch Updated:  05/11/20 2030     RBC, UA 0-2 /HPF      WBC, UA 13-20 /HPF      Bacteria, UA Trace /HPF      Squamous Epithelial Cells, UA 3-5 /HPF      Hyaline Casts, UA 7-12 /LPF      Amorphous Crystals, UA Small/1+ /HPF      Methodology Manual Light Microscopy    Urine Drug Screen - Urine, Clean Catch [[id number]]  (Normal) Collected:  05/11/20 2003    Specimen:  Urine, Clean Catch Updated:  05/11/20 2023     THC, Screen, Urine Negative     Phencyclidine (PCP), Urine Negative     Cocaine Screen, Urine Negative     Methamphetamine, Ur Negative     Opiate Screen Negative     Amphetamine Screen, Urine Negative     Benzodiazepine Screen, Urine Negative     Tricyclic Antidepressants Screen Negative     Methadone Screen, Urine Negative     Barbiturates Screen, Urine Negative     Oxycodone Screen, Urine Negative     Propoxyphene Screen Negative     Buprenorphine, Screen, Urine Negative    Narrative:       Cutoff For Drugs Screened:    Amphetamines               500 ng/ml  Barbiturates               200 ng/ml  Benzodiazepines            150 ng/ml  Cocaine                     150 ng/ml  Methadone                  200 ng/ml  Opiates                    100 ng/ml  Phencyclidine               25 ng/ml  THC                            50 ng/ml  Methamphetamine            500 ng/ml  Tricyclic Antidepressants  300 ng/ml  Oxycodone                  100 ng/ml  Propoxyphene               300 ng/ml  Buprenorphine               10 ng/ml    The normal value for all drugs tested is negative. This report includes unconfirmed screening results, with the cutoff values listed, to be used for medical treatment purposes only.  Unconfirmed results must not be used for non-medical purposes such as employment or legal testing.  Clinical consideration should be applied to any drug of abuse test, particularly when unconfirmed results are used.      Urinalysis With Microscopic If Indicated (No Culture) - Urine, Clean Catch [[id number]]  (Abnormal) Collected:  05/11/20 2003    Specimen:  Urine, Clean Catch Updated:  05/11/20 2019     Color, UA Yellow     Appearance, UA Cloudy     pH, UA <=5.0     Specific Gravity, UA 1.020     Glucose, UA >=1000 mg/dL (3+)     Ketones, UA 80 mg/dL (3+)     Bilirubin, UA Negative     Blood, UA Moderate (2+)     Protein, UA 30 mg/dL (1+)     Leuk Esterase, UA Negative     Nitrite, UA Negative     Urobilinogen, UA 0.2 E.U./dL    hCG, Serum, Qualitative [[id number]]  (Normal) Collected:  05/11/20 1939    Specimen:  Blood Updated:  05/11/20 1947     HCG Qualitative Negative    Lactic Acid, Plasma [[id number]]  (Abnormal) Collected:  05/11/20 1918    Specimen:  Blood Updated:  05/11/20 1946     Lactate 3.8 mmol/L     Extra Tubes [[id number]] Collected:  05/11/20 1812    Specimen:  Blood, Venous Line Updated:  05/11/20 1915    Narrative:       The following orders were created for panel order Extra Tubes.  Procedure                               Abnormality         Status                     ---------                               -----------         ------                     Light Blue  Top[[id number]]                                   Final result                 Please view results for these tests on the individual orders.    Light Blue Top [[id number]] Collected:  05/11/20 1812    Specimen:  Blood Updated:  05/11/20 1915     Extra Tube hold for add-on     Comment: Auto resulted       Comprehensive Metabolic Panel [[id number]]  (Abnormal) Collected:  05/11/20 1803    Specimen:  Blood Updated:  05/11/20 1851     Glucose 1,124 mg/dL      BUN 49 mg/dL      Creatinine 2.03 mg/dL      Sodium 114 mmol/L      Potassium 6.4 mmol/L      Chloride 76 mmol/L      CO2 3.0 mmol/L      Calcium 8.6 mg/dL      Total Protein 6.4 g/dL      Albumin 2.70 g/dL      ALT (SGPT) 12 U/L      AST (SGOT) 20 U/L      Comment: Specimen hemolyzed.  Results may be affected.        Alkaline Phosphatase 268 U/L      Total Bilirubin 0.3 mg/dL      eGFR Non African Amer 26 mL/min/1.73      Globulin 3.7 gm/dL      A/G Ratio 0.7 g/dL      BUN/Creatinine Ratio 24.1     Anion Gap 35.0 mmol/L     Narrative:       GFR Normal >60  Chronic Kidney Disease <60  Kidney Failure <15      Manual Differential [[id number]]  (Abnormal) Collected:  05/11/20 1803    Specimen:  Blood Updated:  05/11/20 1842     Neutrophil % 91.0 %      Lymphocyte % 1.0 %      Monocyte % 5.0 %      Bands %  2.0 %      Metamyelocyte % 1.0 %      Neutrophils Absolute 23.83 10*3/mm3      Lymphocytes Absolute 0.26 10*3/mm3      Monocytes Absolute 1.28 10*3/mm3      Dacrocytes Slight/1+     WBC Morphology Normal     Platelet Morphology Normal    Magnesium [[id number]]  (Normal) Collected:  05/11/20 1803    Specimen:  Blood Updated:  05/11/20 1835     Magnesium 2.6 mg/dL     Lipase [[id number]]  (Abnormal) Collected:  05/11/20 1803    Specimen:  Blood Updated:  05/11/20 1835     Lipase 12 U/L     Acetone [[id number]]  (Abnormal) Collected:  05/11/20 1803    Specimen:  Blood Updated:  05/11/20 1828     Acetone Small    CBC & Differential [[id number]] Collected:  05/11/20 1803     Specimen:  Blood Updated:  05/11/20 1820    Narrative:       The following orders were created for panel order CBC & Differential.  Procedure                               Abnormality         Status                     ---------                               -----------         ------                     CBC Auto Differential[[id number]]        Abnormal            Final result                 Please view results for these tests on the individual orders.    CBC Auto Differential [[id number]]  (Abnormal) Collected:  05/11/20 1803    Specimen:  Blood Updated:  05/11/20 1820     WBC 25.62 10*3/mm3      RBC 3.57 10*6/mm3      Hemoglobin 10.9 g/dL      Hematocrit 38.9 %      .0 fL      MCH 30.5 pg      MCHC 28.0 g/dL      RDW 15.4 %      RDW-SD 63.2 fl      MPV 10.3 fL      Platelets 336 10*3/mm3         Imaging Results (Last 7 Days)     Procedure Component Value Units Date/Time    US Guided Vascular Access [[id number]] Resulted:  05/14/20 1313     Updated:  05/14/20 1313    Narrative:       This procedure was auto-finalized with no dictation required.    IR Insert Midline Without Port Pump 5 Plus [[id number]] Resulted:  05/14/20 1256     Updated:  05/14/20 1256    Narrative:       This procedure was auto-finalized with no dictation required.    CT Abdomen Pelvis Without Contrast [[id number]] Collected:  05/11/20 2007     Updated:  05/11/20 2035    Narrative:         CT Abdomen and Pelvis Without Contrast    History: Abdominal pain    Axials spiral scans of the abdomen and pelvis were obtained  without contrast.  Coronal and sagital reconstructions were  preformed.      This exam was performed according to our departmental  dose-optimization program, which includes automated exposure  control, adjustment of the mA and/or kV according to patient size  and/or use of iterative reconstruction technique.    DLP: 318.10    Comparison: None    Findings:   Scans of the lung bases demonstrate old granulomatous disease.    No  acute osseous abnormality.  Degenerative changes and degenerative disc disease in the lumbar  spine.    The liver is unremarkable.  Cholecystectomy.  Splenic granulomata.  The pancreas is unremarkable.  The adrenal glands are unremarkable.    No renal or ureteral calculi.  No renal mass.  No hydronephrosis.    Unremarkable bladder.  No pelvic mass.  Hysterectomy.    No abdominal aortic aneurysm.  No adenopathy.    Moderate amount of fluid distending the stomach.  Minimally distended fluid-filled distal esophagus, question  gastroesophageal reflux.  Moderate amount retained feces in the colon.  Diverticulosis of the colon.  No bowel obstruction.  Normal appendix.  No free air.  No free fluid.    No hernia.      Impression:       Conclusion:  Moderate amount of fluid distending the stomach.  Minimally distended fluid-filled distal esophagus, question  gastroesophageal reflux.  Moderate amount retained feces in the colon.  Diverticulosis of the colon.  Cholecystectomy.  Hysterectomy.    [id number]    Electronically signed by:  Michelet Lawrence MD  5/11/2020 8:34 PM CDT  Workstation: [employer]    XR Chest 1 View [[id number]] Collected:  05/11/20 1842     Updated:  05/11/20 1952    Narrative:         PORTABLE CHEST    HISTORY: Shortness of breath    Portable AP upright film of the chest was obtained at 7:29 PM.  COMPARISON: None    FINDINGS:   EKG leads.  The lungs are clear of an acute process.  Old granulomatous disease is present.  The heart is not enlarged.  The pulmonary vasculature is not increased.  No pleural effusion.  No pneumothorax.  No acute osseous abnormality.  Surgical clips right upper quadrant of the abdomen.      Impression:       CONCLUSION:  No Acute Disease    [id number]    Electronically signed by:  Michelet Lawrence MD  5/11/2020 7:51 PM CDT  Workstation: [employer]            Chief Complaint on Day of Discharge: None.    Hospital Course:  The patient was admitted for DKA (complicated by acute metabolic  "encephalopathy), acute kidney injury, sepsis secondary to acute cystitis and commenced on DKA protocol, IV antibiotics, IV hydration, bicarbonate infusion and also received repletion secondary to hypokalemia, hypophosphatemia and hypomagnesemia.  Blood and urine cultures came back positive for E. coli and repeat blood culture showed no growth.  Creatinine was back to normal prior to discharge.  DKA did resolve and patient was commenced on diabetic diet.  Her glycemic control was adequate prior to discharge and she did complete a course of antimicrobial therapy.  Patient was also placed on nicotine patch secondary to nicotine dependence and did receive counseling.    She also benefited from PT and OT secondary to deconditioning and will be scheduled to see  as outpatient in 2 days post discharge.        Condition on Discharge: Stable and improved.    Physical Exam on Discharge:  /71   Pulse 101   Temp 97.5 °F (36.4 °C) (Temporal)   Resp 18   Ht 167.6 cm (66\")   Wt 68.7 kg (151 lb 7.3 oz)   SpO2 96%   BMI 24.45 kg/m²    Physical Exam   Constitutional: She is oriented to person, place, and time. She appears well-developed and well-nourished. She is cooperative. No distress.   HENT:   Head: Normocephalic and atraumatic.   Right Ear: External ear normal.   Left Ear: External ear normal.   Nose: Nose normal.   Mouth/Throat: Oropharynx is clear and moist.   Eyes: Pupils are equal, round, and reactive to light. Conjunctivae and EOM are normal.   Neck: Normal range of motion. Neck supple. No JVD present. No thyromegaly present.   Cardiovascular: Normal rate, regular rhythm, normal heart sounds and intact distal pulses. Exam reveals no gallop and no friction rub.   No murmur heard.  Pulmonary/Chest: Effort normal and breath sounds normal. No stridor. No respiratory distress. She has no wheezes. She has no rales. She exhibits no tenderness.   Abdominal: Soft. Bowel sounds are normal. She exhibits no " distension and no mass. There is no tenderness. There is no rebound and no guarding. No hernia.   Musculoskeletal: Normal range of motion. She exhibits no edema, tenderness or deformity.   Neurological: She is alert and oriented to person, place, and time. She has normal reflexes. She displays normal reflexes. No cranial nerve deficit or sensory deficit. She exhibits normal muscle tone. Coordination normal.   Skin: Skin is warm and dry. No rash noted. She is not diaphoretic. No erythema. No pallor.   Psychiatric: She has a normal mood and affect. Her behavior is normal. Judgment and thought content normal.   Nursing note and vitals reviewed.        Discharge Disposition:  Home or Self Care    Discharge Medications:     Discharge Medications      Continue These Medications      Instructions Start Date   albuterol sulfate  (90 Base) MCG/ACT inhaler  Commonly known as:  PROVENTIL HFA;VENTOLIN HFA;PROAIR HFA   1 puff, Inhalation, Every 6 Hours PRN      busPIRone 10 MG tablet  Commonly known as:  BUSPAR   10 mg, Oral      fluticasone 50 MCG/ACT nasal spray  Commonly known as:  FLONASE   1 spray, Nasal, Daily      FreeStyle Janis 14 Day Sensor misc   CHANGE EVERY 14 DAYS      gabapentin 800 MG tablet  Commonly known as:  NEURONTIN   800 mg, Oral, 3 Times Daily      Insulin Glargine 100 UNIT/ML injection pen  Commonly known as:  BASAGLAR KWIKPEN   40 Units, Subcutaneous, Daily      Insulin Lispro (1 Unit Dial) 100 UNIT/ML solution pen-injector  Commonly known as:  HUMALOG   Inject 7 units plus sliding scale into the skin TID with meals      Insulin Pen Needle 31G X 8 MM misc   use as directed      ipratropium-albuterol 0.5-2.5 mg/3 ml nebulizer  Commonly known as:  DUO-NEB   No dose, route, or frequency recorded.      Lancets misc   No dose, route, or frequency recorded.      levocetirizine 5 MG tablet  Commonly known as:  XYZAL   5 mg, Oral, Daily      omeprazole 20 MG capsule  Commonly known as:  priLOSEC   20 mg,  "Oral, Daily      OneTouch Verio test strip  Generic drug:  glucose blood   1 strip, 3 Times Daily      pramipexole 0.5 MG tablet  Commonly known as:  MIRAPEX   1 mg, Oral, Nightly      simvastatin 20 MG tablet  Commonly known as:  ZOCOR   20 mg, Oral, Daily      SITagliptin 50 MG tablet  Commonly known as:  JANUVIA   50 mg, Oral, Daily      traZODone 100 MG tablet  Commonly known as:  DESYREL   100 mg, Oral      UltiCare Insulin Syringe 30G X 1/2\" 0.3 ML misc  Generic drug:  Insulin Syringe-Needle U-100   No dose, route, or frequency recorded.      venlafaxine XR 75 MG 24 hr capsule  Commonly known as:  EFFEXOR-XR   75 mg, Oral, Daily         Stop These Medications    estradiol 2 MG tablet  Commonly known as:  ESTRACE     ibuprofen 800 MG tablet  Commonly known as:  ADVIL,MOTRIN            Discharge Diet: Heart healthy and diabetic.    Activity at Discharge: As tolerated.    Discharge Care Plan/Instructions: Patient has been advised to take her medications as prescribed and to return to the emergency room in the event of any worsening symptoms.    Follow-up Appointments:   Future Appointments   Date Time Provider Department Center   5/20/2020  2:45 PM Matt Mahmood MD [facility] None       Test Results Pending at Discharge:    Order Current Status    Blood Culture - Blood, Arm, Left Preliminary result          Misha Hart MD  05/18/20  10:07    Time: 35 minutes.                Electronically signed by Misha Hart MD at 05/18/20 1014       Discharge Order (From admission, onward)     Start     Ordered    05/18/20 1005  Discharge patient  Once     Expected Discharge Date:  05/18/20    Discharge Disposition:  Home or Self Care    Physician of Record for Attribution - Please select from Treatment Team:  MISHA HART [[id number]]    Review needed by CMO to determine Physician of Record:  No       Question Answer Comment   Physician of Record for Attribution - Please select from Treatment " Team LESLIE HANDLEY    Review needed by CMO to determine Physician of Record No        05/18/20 1008

## 2020-05-20 LAB — BACTERIA SPEC AEROBE CULT: NORMAL

## 2020-05-21 ENCOUNTER — READMISSION MANAGEMENT (OUTPATIENT)
Dept: CALL CENTER | Facility: HOSPITAL | Age: 50
End: 2020-05-21

## 2020-05-21 NOTE — OUTREACH NOTE
Sepsis Week 1 Survey      Responses   [facility] patient discharged from?  [place]   COVID-19 Test Status  Not tested   Does the patient have one of the following disease processes/diagnoses(primary or secondary)?  Sepsis   Is there a successful TCM telephone encounter documented?  No   Week 1 attempt successful?  No   Unsuccessful attempts  Attempt 1          Mirna Jay RN

## 2020-05-22 ENCOUNTER — READMISSION MANAGEMENT (OUTPATIENT)
Dept: CALL CENTER | Facility: HOSPITAL | Age: 50
End: 2020-05-22

## 2020-05-22 NOTE — OUTREACH NOTE
Sepsis Week 1 Survey      Responses   [facility] patient discharged from?  [place]   COVID-19 Test Status  Not tested   Does the patient have one of the following disease processes/diagnoses(primary or secondary)?  Sepsis   Is there a successful TCM telephone encounter documented?  No   Week 1 attempt successful?  No   Unsuccessful attempts  Attempt 2          Aaron Escobar RN

## 2020-05-26 ENCOUNTER — READMISSION MANAGEMENT (OUTPATIENT)
Dept: CALL CENTER | Facility: HOSPITAL | Age: 50
End: 2020-05-26

## 2020-05-26 NOTE — OUTREACH NOTE
Sepsis Week 1 Survey      Responses   [facility] patient discharged from?  [place]   COVID-19 Test Status  Not tested   Does the patient have one of the following disease processes/diagnoses(primary or secondary)?  Sepsis   Is there a successful TCM telephone encounter documented?  No   Week 1 attempt successful?  No   Unsuccessful attempts  Attempt 3          Mirna Jay RN

## 2020-06-01 ENCOUNTER — READMISSION MANAGEMENT (OUTPATIENT)
Dept: CALL CENTER | Facility: HOSPITAL | Age: 50
End: 2020-06-01

## 2020-06-01 NOTE — OUTREACH NOTE
Sepsis Week 2 Survey      Responses   [facility] patient discharged from?  [place]   COVID-19 Test Status  Not tested   Does the patient have one of the following disease processes/diagnoses(primary or secondary)?  Sepsis   Week 2 attempt successful?  No   Unsuccessful attempts  Attempt 1          Tahir Roberts RN

## 2020-06-03 ENCOUNTER — READMISSION MANAGEMENT (OUTPATIENT)
Dept: CALL CENTER | Facility: HOSPITAL | Age: 50
End: 2020-06-03

## 2020-06-03 NOTE — OUTREACH NOTE
Sepsis Week 2 Survey      Responses   [facility] patient discharged from?  [place]   COVID-19 Test Status  Not tested   Does the patient have one of the following disease processes/diagnoses(primary or secondary)?  Sepsis   Week 2 attempt successful?  No   Unsuccessful attempts  Attempt 2          Mirna Jay RN

## 2020-06-09 ENCOUNTER — READMISSION MANAGEMENT (OUTPATIENT)
Dept: CALL CENTER | Facility: HOSPITAL | Age: 50
End: 2020-06-09

## 2020-06-09 NOTE — OUTREACH NOTE
Sepsis Week 3 Survey      Responses   [facility] patient discharged from?  [place]   Does the patient have one of the following disease processes/diagnoses(primary or secondary)?  Sepsis   Week 3 attempt successful?  No   Unsuccessful attempts  Attempt 1   Rescheduled  Revoked [stated number is not in service]          Gabriela Wise RN